# Patient Record
Sex: FEMALE | Race: WHITE | NOT HISPANIC OR LATINO | URBAN - METROPOLITAN AREA
[De-identification: names, ages, dates, MRNs, and addresses within clinical notes are randomized per-mention and may not be internally consistent; named-entity substitution may affect disease eponyms.]

---

## 2018-04-23 ENCOUNTER — INPATIENT (INPATIENT)
Facility: HOSPITAL | Age: 83
LOS: 6 days | Discharge: NURSING, REHAB OR LONG-TERM CARE | End: 2018-04-30
Attending: INTERNAL MEDICINE | Admitting: INTERNAL MEDICINE

## 2018-04-23 VITALS
TEMPERATURE: 99 F | DIASTOLIC BLOOD PRESSURE: 95 MMHG | HEART RATE: 98 BPM | RESPIRATION RATE: 18 BRPM | OXYGEN SATURATION: 98 % | SYSTOLIC BLOOD PRESSURE: 210 MMHG

## 2018-04-23 LAB
ALBUMIN SERPL ELPH-MCNC: 4.1 G/DL — SIGNIFICANT CHANGE UP (ref 3.5–5.2)
ALP SERPL-CCNC: 74 U/L — SIGNIFICANT CHANGE UP (ref 30–115)
ALT FLD-CCNC: 16 U/L — SIGNIFICANT CHANGE UP (ref 0–41)
ANION GAP SERPL CALC-SCNC: 13 MMOL/L — SIGNIFICANT CHANGE UP (ref 7–14)
APTT BLD: 29 SEC — SIGNIFICANT CHANGE UP (ref 27–39.2)
AST SERPL-CCNC: 21 U/L — SIGNIFICANT CHANGE UP (ref 0–41)
BASOPHILS # BLD AUTO: 0.07 K/UL — SIGNIFICANT CHANGE UP (ref 0–0.2)
BASOPHILS NFR BLD AUTO: 0.9 % — SIGNIFICANT CHANGE UP (ref 0–1)
BILIRUB SERPL-MCNC: 0.3 MG/DL — SIGNIFICANT CHANGE UP (ref 0.2–1.2)
BUN SERPL-MCNC: 16 MG/DL — SIGNIFICANT CHANGE UP (ref 10–20)
CALCIUM SERPL-MCNC: 9 MG/DL — SIGNIFICANT CHANGE UP (ref 8.5–10.1)
CHLORIDE SERPL-SCNC: 105 MMOL/L — SIGNIFICANT CHANGE UP (ref 98–110)
CK SERPL-CCNC: 89 U/L — SIGNIFICANT CHANGE UP (ref 0–225)
CO2 SERPL-SCNC: 26 MMOL/L — SIGNIFICANT CHANGE UP (ref 17–32)
CREAT SERPL-MCNC: 0.6 MG/DL — LOW (ref 0.7–1.5)
EOSINOPHIL # BLD AUTO: 0.14 K/UL — SIGNIFICANT CHANGE UP (ref 0–0.7)
EOSINOPHIL NFR BLD AUTO: 1.7 % — SIGNIFICANT CHANGE UP (ref 0–8)
GLUCOSE SERPL-MCNC: 98 MG/DL — SIGNIFICANT CHANGE UP (ref 70–99)
HCT VFR BLD CALC: 39.9 % — SIGNIFICANT CHANGE UP (ref 37–47)
HGB BLD-MCNC: 13.4 G/DL — SIGNIFICANT CHANGE UP (ref 12–16)
INR BLD: 1.02 RATIO — SIGNIFICANT CHANGE UP (ref 0.65–1.3)
LYMPHOCYTES # BLD AUTO: 1.14 K/UL — LOW (ref 1.2–3.4)
LYMPHOCYTES # BLD AUTO: 13.9 % — LOW (ref 20.5–51.1)
MCHC RBC-ENTMCNC: 27.5 PG — SIGNIFICANT CHANGE UP (ref 27–31)
MCHC RBC-ENTMCNC: 33.6 G/DL — SIGNIFICANT CHANGE UP (ref 32–37)
MCV RBC AUTO: 81.9 FL — SIGNIFICANT CHANGE UP (ref 81–99)
MONOCYTES # BLD AUTO: 0.71 K/UL — HIGH (ref 0.1–0.6)
MONOCYTES NFR BLD AUTO: 8.7 % — SIGNIFICANT CHANGE UP (ref 1.7–9.3)
NEUTROPHILS # BLD AUTO: 5.91 K/UL — SIGNIFICANT CHANGE UP (ref 1.4–6.5)
NEUTROPHILS NFR BLD AUTO: 72.2 % — SIGNIFICANT CHANGE UP (ref 42.2–75.2)
PLATELET # BLD AUTO: 258 K/UL — SIGNIFICANT CHANGE UP (ref 130–400)
POTASSIUM SERPL-MCNC: 3.8 MMOL/L — SIGNIFICANT CHANGE UP (ref 3.5–5)
POTASSIUM SERPL-SCNC: 3.8 MMOL/L — SIGNIFICANT CHANGE UP (ref 3.5–5)
PROT SERPL-MCNC: 6.7 G/DL — SIGNIFICANT CHANGE UP (ref 6–8)
PROTHROM AB SERPL-ACNC: 11 SEC — SIGNIFICANT CHANGE UP (ref 9.95–12.87)
RBC # BLD: 4.87 M/UL — SIGNIFICANT CHANGE UP (ref 4.2–5.4)
RBC # FLD: 13.2 % — SIGNIFICANT CHANGE UP (ref 11.5–14.5)
SODIUM SERPL-SCNC: 144 MMOL/L — SIGNIFICANT CHANGE UP (ref 135–146)
TROPONIN T SERPL-MCNC: <0.01 NG/ML — SIGNIFICANT CHANGE UP
TYPE + AB SCN PNL BLD: SIGNIFICANT CHANGE UP
WBC # BLD: 8.19 K/UL — SIGNIFICANT CHANGE UP (ref 4.8–10.8)
WBC # FLD AUTO: 8.19 K/UL — SIGNIFICANT CHANGE UP (ref 4.8–10.8)

## 2018-04-23 RX ORDER — LEVETIRACETAM 250 MG/1
500 TABLET, FILM COATED ORAL EVERY 12 HOURS
Qty: 0 | Refills: 0 | Status: DISCONTINUED | OUTPATIENT
Start: 2018-04-23 | End: 2018-04-30

## 2018-04-23 RX ORDER — ATORVASTATIN CALCIUM 80 MG/1
40 TABLET, FILM COATED ORAL AT BEDTIME
Qty: 0 | Refills: 0 | Status: DISCONTINUED | OUTPATIENT
Start: 2018-04-23 | End: 2018-04-30

## 2018-04-23 RX ORDER — LEVETIRACETAM 250 MG/1
1000 TABLET, FILM COATED ORAL ONCE
Qty: 0 | Refills: 0 | Status: COMPLETED | OUTPATIENT
Start: 2018-04-23 | End: 2018-04-23

## 2018-04-23 RX ORDER — ACETAMINOPHEN 500 MG
650 TABLET ORAL EVERY 6 HOURS
Qty: 0 | Refills: 0 | Status: DISCONTINUED | OUTPATIENT
Start: 2018-04-23 | End: 2018-04-30

## 2018-04-23 RX ORDER — ACETAMINOPHEN 500 MG
650 TABLET ORAL ONCE
Qty: 0 | Refills: 0 | Status: COMPLETED | OUTPATIENT
Start: 2018-04-23 | End: 2018-04-23

## 2018-04-23 RX ORDER — SODIUM CHLORIDE 5 G/100ML
500 INJECTION, SOLUTION INTRAVENOUS
Qty: 0 | Refills: 0 | Status: DISCONTINUED | OUTPATIENT
Start: 2018-04-23 | End: 2018-04-24

## 2018-04-23 RX ORDER — NICARDIPINE HYDROCHLORIDE 30 MG/1
5 CAPSULE, EXTENDED RELEASE ORAL
Qty: 40 | Refills: 0 | Status: DISCONTINUED | OUTPATIENT
Start: 2018-04-23 | End: 2018-04-25

## 2018-04-23 RX ADMIN — Medication 650 MILLIGRAM(S): at 23:45

## 2018-04-23 RX ADMIN — LEVETIRACETAM 400 MILLIGRAM(S): 250 TABLET, FILM COATED ORAL at 22:52

## 2018-04-23 RX ADMIN — NICARDIPINE HYDROCHLORIDE 25 MG/HR: 30 CAPSULE, EXTENDED RELEASE ORAL at 22:13

## 2018-04-23 RX ADMIN — SODIUM CHLORIDE 50 MILLILITER(S): 5 INJECTION, SOLUTION INTRAVENOUS at 22:13

## 2018-04-23 NOTE — H&P ADULT - HISTORY OF PRESENT ILLNESS
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  patient states that her legs gave out from underneath her and she was unable to get up due to weakness. Was on the floor for approximately 1 hour before her  found her and tried to help her up. came to the ED 3 hours after initial fall. In the ED patient found to be hypertensive /95, NIHSS 7, stroke code called and patient found to have an intraparenchymal bleed.   intially during interview patient denied any symptoms including headache but immediately upon leaving room patient states that she does have a frontal headache. 83 yo female with PMH of HLD 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  patient states that her legs gave out from underneath her and she was unable to get up due to weakness. Was on the floor for approximately 1 hour before her  found her and tried to help her up. came to the ED 3 hours after initial fall. In the ED patient found to be hypertensive /95, NIHSS 7, stroke code called and patient found to have an intraparenchymal bleed.   intially during interview patient denied any symptoms including headache but immediately upon leaving room patient states that she does have a frontal headache.    in december 2017 patient had a mechanical fall, tripped over roadside curb getting out of the car and had inability to ambulate. required assistance to get indoors. went to see PMD, had a swollen ankle, not acute fracture noted from event, weakness resolved spontaneously.

## 2018-04-23 NOTE — H&P ADULT - NSHPSOCIALHISTORY_GEN_ALL_CORE
quit smoking 3 months ptp (smoked 1ppd since 17y/o)  no etoh use  no drug use    lives at home with   completes all ADL's except driving

## 2018-04-23 NOTE — CONSULT NOTE ADULT - ASSESSMENT
a/p as above    no neurosurgical intervention at this time    maintain bp between 130-140 systolic    q 1hour neuro checks    contentious 3% saline at 50 ml     platelets to reverse aspirin    icu admission    keppra 1gm once; 500 mg bid    discussed with dr murry and agrees a/p as above    no neurosurgical intervention at this time    repeat head ct noncontrast 4/24/18  0300    possible cta in am     maintain bp between 130-140 systolic    q 1hour neuro checks    contentious 3% saline at 50 ml     platelets to reverse aspirin    icu admission    keppra 1gm once; 500 mg bid    discussed with dr murry and agrees a/p as above    no neurosurgical intervention at this time    repeat head ct noncontrast 4/24/18  0300    possible cta in am     maintain bp between 130-140 systolic    target sodium of 150-155    q 1hour neuro checks    contentious 3% saline at 50 ml     platelets to reverse aspirin    icu admission    keppra 1gm once; 500 mg bid    discussed with dr murry and agrees

## 2018-04-23 NOTE — ED PROVIDER NOTE - MEDICAL DECISION MAKING DETAILS
I personally evaluated the patient. I reviewed the Resident’s or Physician Assistant’s note (as assigned above), and agree with the findings and plan except as documented in my note. Endorsed to the ICU, neurology and Nsx on board as well, given platelets to revers ICH

## 2018-04-23 NOTE — ED PROVIDER NOTE - NS ED ROS FT
Eyes:  No visual changes, eye pain or discharge.  ENMT:  No hearing changes, pain, no sore throat or runny nose, no difficulty swallowing  Cardiac:  No chest pain, SOB or edema. No chest pain with exertion.  Respiratory:  No cough or respiratory distress. No hemoptysis. No history of asthma or RAD.  GI:  No nausea, vomiting, diarrhea or abdominal pain.  :  No dysuria, frequency or burning.  MS:  right sided weakness   Neuro:  right sided weakness, slurred speech   Skin:  No skin rash.   Endocrine: No history of thyroid disease or diabetes.

## 2018-04-23 NOTE — H&P ADULT - ASSESSMENT
83 yo female p/w fall and right sidede weakness, found to have intracranial  intraparenchymal hemorrhage      # intracranial parenchymal hemorrhage with mild surrounding edema  -admit to ICU  Neurosurgery on board  - patient given 2u of platelets to reverse aspirin per neurosurgery  - seizure ppx per NS-loaded with 1G keppra, then 500mg bid  -NPO, S&S eval  - nicardipine infusion -keep bp <140/90  - 3% saline. goal -155. check BMP q4h  - tylenol prn  -patient and family instructed to notify heathcare team of any changes to patients status ranging from change in headache, to new focal deficit.  -2decho  - 1st set cardiac enzymes negative, check 2nd set in am  -pt/rehab when more stable    #hld  atorvastatin 40mg qhs    #dvt ppx- intermittent pneumatic compression

## 2018-04-23 NOTE — ED PROVIDER NOTE - OBJECTIVE STATEMENT
81 yo F w hx of HLD c/o right sided weakness and slurred speech that started suddenly 3 hours prior to her arrival.  Pt states she was ironing and fell to the ground.  NO n/v.  no chest pain.  no loc.  pt a and o x3

## 2018-04-23 NOTE — H&P ADULT - NSHPPHYSICALEXAM_GEN_ALL_CORE
PHYSICAL EXAM:  GENERAL: NAD, speaks in full sentences, no signs of respiratory distress  HEAD:  Atraumatic, Normocephalic  EYES: EOMI, PERRLA, conjunctiva and sclera clear  NECK: Supple, No JVD  CHEST/LUNG: Clear to auscultation bilaterally; No wheeze; No crackles; No accessory muscles used  HEART: Regular rate and rhythm; No murmurs;   ABDOMEN: Soft, Nontender, Nondistended; Bowel sounds present; No guarding  EXTREMITIES:   No cyanosis or edema, right lowere extremity 0/10 and  no sensation, left lowere extremity 5/5 strength, right upper extremity 2/5 stregth, left upper  extremity 5/5 strength  PSYCH: AAOx3  NEUROLOGY: non-focal  SKIN: No rashes or lesions PHYSICAL EXAM:  GENERAL: NAD, speaks in full sentences, no signs of respiratory distress  HEAD:  Atraumatic, Normocephalic  EYES: EOMI, PERRLA, conjunctiva and sclera clear  NECK: Supple, No JVD  CHEST/LUNG: Clear to auscultation bilaterally; No wheeze; No crackles; No accessory muscles used  HEART: Regular rate and rhythm; No murmurs;   ABDOMEN: Soft, Nontender, Nondistended; Bowel sounds present; No guarding  EXTREMITIES:   No cyanosis or edema, right lower extremity 0/10 and  no sensation, left lower extremity 5/5 strength, right upper extremity 2/5 stregth, left upper  extremity 5/5 strength  PSYCH: AAOx2 (person and place)  SKIN: No rashes or lesions

## 2018-04-23 NOTE — ED PROVIDER NOTE - PHYSICAL EXAMINATION
CONSTITUTIONAL: Well-developed; well-nourished; in no acute distress.   SKIN: warm, dry  HEAD: Normocephalic; atraumatic.  EYES: PERRL, EOMI, no conjunctival erythema  ENT: No nasal discharge; airway clear.  NECK: Supple; non tender.  CARD: S1, S2 normal; no murmurs, gallops, or rubs. Regular rate and rhythm.   RESP: No wheezes, rales or rhonchi.  ABD: soft ntnd  EXT: Normal ROM.  No clubbing, cyanosis or edema.   NEURO: Alert, oriented, CN intact,  Right sided upper ext drift, right lower ext weakness, sensation grossly intact   PSYCH: Cooperative, appropriate.

## 2018-04-23 NOTE — ED PROVIDER NOTE - ATTENDING CONTRIBUTION TO CARE
83 yo F w hx of HLD c/o right sided weakness and slurred speech that started suddenly 3 hours prior to her arrival.  Pt states she was ironing and fell to the ground.  NO n/v.  no chest pain.  no loc. Patient as found on the ground by family patient has slurred speech and not moving rle as per ems.     CONSTITUTIONAL: Patient is bibems and is leaning to the right side, patient is verbal, + mild slurred speech but is improving, patient providing history and is protecting airway  SKIN: skin exam is warm and dry, no acute rash.  HEAD: Normocephalic; atraumatic.  EYES: PERRL, 3 mm bilateral, no nystagmus, EOM intact; conjunctiva and sclera clear.  ENT: No nasal discharge; airway clear.  NECK: Supple; non tender.+ full passive ROM in all directions. No JVD  CARD: S1, S2 normal; no murmurs, gallops, or rubs. Regular rate and rhythm. + Symmetric Strong Pulses  RESP: No wheezes, rales or rhonchi. Good air movement bilaterally  ABD: soft; non-distended; non-tender. No Rebound, No Gaurding, No signs of peritnitis, No CVA tenderness  EXT: + minimal movement of rle. Normal ROM. No clubbing, cyanosis or edema. Dp and Pt Pulses intact. Cap refill less than 3 seconds  NEURO: + slurred speech, + RUE weakness, + mild right lower half facial droop, + rle weakness, otherwise, CN 2-12 intact, normal finger to nose, normal romberg, did not attempt ambulation, , no other sensory or motor deficits, Alert, oriented, grossly unremarkable. No Focal deficits. GCS 15. NIH as indicated by mlp  PSYCH: Cooperative, appropriate.

## 2018-04-23 NOTE — ED PROCEDURE NOTE - ATTENDING CONTRIBUTION TO CARE
I was present for the key portions of the procedure and available as supervisor for the entire procedure as documented.
I was present for the key portions of the procedure and available as supervisor for the entire procedure as documented.

## 2018-04-23 NOTE — ED PROVIDER NOTE - PROGRESS NOTE DETAILS
PT with ICH, will place Arterial Line, start nicardipine drip spoke to neurosurgery ABHINAV Cho,  will come evaluate NSX at bedside,  pt approved for MICU by Dr. Sethi

## 2018-04-23 NOTE — CONSULT NOTE ADULT - SUBJECTIVE AND OBJECTIVE BOX
82y    Patient is a 82y old  Female who presents with a chief complaint of     HPI:      PAST MEDICAL & SURGICAL HISTORY:      Home Medications:      Allergies    Celebrex (Other (Moderate))  sulfonamides (Unknown)    Intolerances          Vital Signs Last 24 Hrs  T(C): 37.1 (23 Apr 2018 19:14), Max: 37.1 (23 Apr 2018 19:14)  T(F): 98.8 (23 Apr 2018 19:14), Max: 98.8 (23 Apr 2018 19:14)  HR: 98 (23 Apr 2018 19:14) (98 - 98)  BP: 210/95 (23 Apr 2018 19:14) (210/95 - 210/95)  BP(mean): --  RR: 18 (23 Apr 2018 19:14) (18 - 18)  SpO2: 98% (23 Apr 2018 19:14) (98% - 98%)      PHYSICAL EXAM:      Constitutional:    Eyes:    ENMT:    Neck:    Breasts:    Back:    Respiratory:    Cardiovascular:    Gastrointestinal:    Genitourinary:    Rectal:    Extremities:    Vascular:    Neurological:    Skin:    Lymph Nodes:    Musculoskeletal:    Psychiatric:                                13.4   8.19  )-----------( 258      ( 23 Apr 2018 18:59 )             39.9         04-23    144  |  105  |  16  ----------------------------<  98  3.8   |  26  |  0.6<L>    Ca    9.0      23 Apr 2018 18:59    TPro  6.7  /  Alb  4.1  /  TBili  0.3  /  DBili  x   /  AST  21  /  ALT  16  /  AlkPhos  74  04-23      PT/INR - ( 23 Apr 2018 18:59 )   PT: 11.00 sec;   INR: 1.02 ratio         PTT - ( 23 Apr 2018 18:59 )  PTT:29.0 sec        MEDICATIONS  (STANDING):  niCARdipine Infusion 5 mG/Hr (25 mL/Hr) IV Continuous <Continuous>  sodium chloride 3%. 500 milliLiter(s) (50 mL/Hr) IV Continuous <Continuous>    MEDICATIONS  (PRN): Patient is a 82y old  Female who presents with a chief complaint of     HPI:   81 yo F w hx of HLD c/o right sided weakness and slurred speech that started suddenly 3 hours prior to her arrival.  Pt states she was ironing and fell to the ground.  NO n/v.  no chest pain.  no loc.  pt a and o x3, pt states she became weak while ironing and fell to ground unable to get up.      PAST MEDICAL & SURGICAL HISTORY:  htn    Home Medications:  asa    Allergies    Celebrex (Other (Moderate))  sulfonamides (Unknown)        Vital Signs Last 24 Hrs  T(C): 37.1 (23 Apr 2018 19:14), Max: 37.1 (23 Apr 2018 19:14)  T(F): 98.8 (23 Apr 2018 19:14), Max: 98.8 (23 Apr 2018 19:14)  HR: 98 (23 Apr 2018 19:14) (98 - 98)  BP: 210/95 (23 Apr 2018 19:14) (210/95 - 210/95)    RR: 18 (23 Apr 2018 19:14) (18 - 18)  SpO2: 98% (23 Apr 2018 19:14) (98% - 98%)      PHYSICAL EXAM:      Constitutional: aaxo x 3, speak clearly, moving spontaniously    Eyes: 3mm perrl, eomi    ENMT: slight right side facial droop, able to smile , frown, tongue midline, sensory intact    Neck: head tilted left moving spontaniously    Back: no midline tenderness    Respiratory: cta b/l    Cardiovascular: s1, s2    Gastrointestinal: abd soft nondistended nontender,     Genitourinary: no incontenence    Rectal: declined    Extremities: left upper extremity - finger abduction, , thumbs up wrist flexion elbow flex/extension shoulder abduction intact motor 4/5 sensory intact                    right upper extremity - , thumbs up wrist flexion elbow flex/extension intact motor 3/5 sensory intact                    left lower extremity - great toe df/pf, ankle df knee flex/extension hip flexion slr, intact 4/5 motor, sensory intact three dematones                    right lower extremity - withdraw to pain, slight ankle df, motor 2/5 sensory intact               13.4   8.19  )-----------( 258      ( 23 Apr 2018 18:59 )             39.9         04-23    144  |  105  |  16  ----------------------------<  98  3.8   |  26  |  0.6<L>    Ca    9.0      23 Apr 2018 18:59    TPro  6.7  /  Alb  4.1  /  TBili  0.3  /  DBili  x   /  AST  21  /  ALT  16  /  AlkPhos  74  04-23      PT/INR - ( 23 Apr 2018 18:59 )   PT: 11.00 sec;   INR: 1.02 ratio         PTT - ( 23 Apr 2018 18:59 )  PTT:29.0 sec       CT Head No Cont (04.23.18 @ 19:10) >  Acute intracranial hemorrhage within the left medial posterior parietal   region measuring approximately 3.4 x 2.3 cm (series 2 image 24). Mild   surrounding edema without significant mass effect.        MEDICATIONS  (STANDING):  niCARdipine Infusion 5 mG/Hr (25 mL/Hr) IV Continuous <Continuous>  sodium chloride 3%. 500 milliLiter(s) (50 mL/Hr) IV Continuous <Continuous>    MEDICATIONS  (PRN): Patient is a 82y old  Female who presents with a chief complaint of weakness on right side    HPI:   81 yo F w hx of HLD c/o right sided weakness and slurred speech that started suddenly 3 hours prior to her arrival.  Pt states she was ironing and fell to the ground.  NO n/v.  no chest pain.  no loc.  pt a and o x3, pt states she became weak while ironing and fell to ground unable to get up.      PAST MEDICAL & SURGICAL HISTORY:  htn    Home Medications:  asa    Allergies    Celebrex (Other (Moderate))  sulfonamides (Unknown)        Vital Signs Last 24 Hrs  T(C): 37.1 (23 Apr 2018 19:14), Max: 37.1 (23 Apr 2018 19:14)  T(F): 98.8 (23 Apr 2018 19:14), Max: 98.8 (23 Apr 2018 19:14)  HR: 98 (23 Apr 2018 19:14) (98 - 98)  BP: 210/95 (23 Apr 2018 19:14) (210/95 - 210/95)    RR: 18 (23 Apr 2018 19:14) (18 - 18)  SpO2: 98% (23 Apr 2018 19:14) (98% - 98%)      PHYSICAL EXAM:      Constitutional: aaxo x 3, speak clearly, moving spontaniously    Eyes: 3mm perrl, eomi    ENMT: slight right side facial droop, able to smile , frown, tongue midline, sensory intact    Neck: head tilted left moving spontaniously    Back: no midline tenderness    Respiratory: cta b/l    Cardiovascular: s1, s2    Gastrointestinal: abd soft nondistended nontender,     Genitourinary: no incontenence    Rectal: declined    Extremities: left upper extremity - finger abduction, , thumbs up wrist flexion elbow flex/extension shoulder abduction intact motor 4/5 sensory intact                    right upper extremity - , thumbs up wrist flexion elbow flex/extension intact motor 3/5 sensory intact                    left lower extremity - great toe df/pf, ankle df knee flex/extension hip flexion slr, intact 4/5 motor, sensory intact three dematones                    right lower extremity - withdraw to pain, slight ankle df, motor 2/5 sensory intact               13.4   8.19  )-----------( 258      ( 23 Apr 2018 18:59 )             39.9         04-23    144  |  105  |  16  ----------------------------<  98  3.8   |  26  |  0.6<L>    Ca    9.0      23 Apr 2018 18:59    TPro  6.7  /  Alb  4.1  /  TBili  0.3  /  DBili  x   /  AST  21  /  ALT  16  /  AlkPhos  74  04-23      PT/INR - ( 23 Apr 2018 18:59 )   PT: 11.00 sec;   INR: 1.02 ratio         PTT - ( 23 Apr 2018 18:59 )  PTT:29.0 sec       CT Head No Cont (04.23.18 @ 19:10) >  Acute intracranial hemorrhage within the left medial posterior parietal   region measuring approximately 3.4 x 2.3 cm (series 2 image 24). Mild   surrounding edema without significant mass effect.        MEDICATIONS  (STANDING):  niCARdipine Infusion 5 mG/Hr (25 mL/Hr) IV Continuous <Continuous>  sodium chloride 3%. 500 milliLiter(s) (50 mL/Hr) IV Continuous <Continuous>    MEDICATIONS  (PRN):

## 2018-04-23 NOTE — H&P ADULT - NSHPLABSRESULTS_GEN_ALL_CORE
Allergies    Celebrex (Other (Moderate))  sulfonamides (Unknown)    Intolerances        T(F): 98.8 (04-23-18 @ 19:14), Max: 98.8 (04-23-18 @ 19:14)  HR: 98 (04-23-18 @ 19:14) (98 - 98)  BP: 210/95 (04-23-18 @ 19:14) (210/95 - 210/95)  BP(mean): --  RR: 18 (04-23-18 @ 19:14) (18 - 18)  SpO2: 98% (04-23-18 @ 19:14) (98% - 98%)    04-23    144  |  105  |  16  ----------------------------<  98  3.8   |  26  |  0.6<L>    Ca    9.0      23 Apr 2018 18:59    TPro  6.7  /  Alb  4.1  /  TBili  0.3  /  DBili  x   /  AST  21  /  ALT  16  /  AlkPhos  74  04-23                            13.4   8.19  )-----------( 258      ( 23 Apr 2018 18:59 )             39.9                                     PT/INR - ( 23 Apr 2018 18:59 )   PT: 11.00 sec;   INR: 1.02 ratio         PTT - ( 23 Apr 2018 18:59 )  PTT:29.0 sec    Lactate Trend      CARDIAC MARKERS ( 23 Apr 2018 18:59 )  x     / <0.01 ng/mL / 89 U/L / x     / x Allergies    Celebrex (Other (Moderate))  sulfonamides (Unknown)    Intolerances        T(F): 98.8 (04-23-18 @ 19:14), Max: 98.8 (04-23-18 @ 19:14)  HR: 98 (04-23-18 @ 19:14) (98 - 98)  BP: 210/95 (04-23-18 @ 19:14) (210/95 - 210/95)  BP(mean): --  RR: 18 (04-23-18 @ 19:14) (18 - 18)  SpO2: 98% (04-23-18 @ 19:14) (98% - 98%)    04-23    144  |  105  |  16  ----------------------------<  98  3.8   |  26  |  0.6<L>    Ca    9.0      23 Apr 2018 18:59    TPro  6.7  /  Alb  4.1  /  TBili  0.3  /  DBili  x   /  AST  21  /  ALT  16  /  AlkPhos  74  04-23                            13.4   8.19  )-----------( 258      ( 23 Apr 2018 18:59 )             39.9                                     PT/INR - ( 23 Apr 2018 18:59 )   PT: 11.00 sec;   INR: 1.02 ratio         PTT - ( 23 Apr 2018 18:59 )  PTT:29.0 sec    Lactate Trend      CARDIAC MARKERS ( 23 Apr 2018 18:59 )  x     / <0.01 ng/mL / 89 U/L / x     / x    < from: CT Head No Cont (04.23.18 @ 19:10) >    Acute intracranial parenchymal hemorrhage within the left medial   posterior parietal region measuring approximately 3.4 cm. Mild   surrounding edema without significant mass effect.    < end of copied text >

## 2018-04-23 NOTE — ED PROCEDURE NOTE - CPROC ED ARTER LINE DETAIL1
Using sterile technique, the correct location was identified, and a needle was inserted into the artery (specify in FT).

## 2018-04-24 LAB
ALBUMIN SERPL ELPH-MCNC: 3.8 G/DL — SIGNIFICANT CHANGE UP (ref 3.5–5.2)
ALP SERPL-CCNC: 65 U/L — SIGNIFICANT CHANGE UP (ref 30–115)
ALT FLD-CCNC: 18 U/L — SIGNIFICANT CHANGE UP (ref 0–41)
ANION GAP SERPL CALC-SCNC: 11 MMOL/L — SIGNIFICANT CHANGE UP (ref 7–14)
ANION GAP SERPL CALC-SCNC: 12 MMOL/L — SIGNIFICANT CHANGE UP (ref 7–14)
ANION GAP SERPL CALC-SCNC: 12 MMOL/L — SIGNIFICANT CHANGE UP (ref 7–14)
ANION GAP SERPL CALC-SCNC: 16 MMOL/L — HIGH (ref 7–14)
APTT BLD: 25.2 SEC — LOW (ref 27–39.2)
AST SERPL-CCNC: 16 U/L — SIGNIFICANT CHANGE UP (ref 0–41)
BASOPHILS # BLD AUTO: 0.03 K/UL — SIGNIFICANT CHANGE UP (ref 0–0.2)
BASOPHILS NFR BLD AUTO: 0.3 % — SIGNIFICANT CHANGE UP (ref 0–1)
BILIRUB DIRECT SERPL-MCNC: <0.2 MG/DL — SIGNIFICANT CHANGE UP (ref 0–0.2)
BILIRUB INDIRECT FLD-MCNC: >0.2 MG/DL — SIGNIFICANT CHANGE UP (ref 0.2–1.2)
BILIRUB SERPL-MCNC: 0.4 MG/DL — SIGNIFICANT CHANGE UP (ref 0.2–1.2)
BUN SERPL-MCNC: 11 MG/DL — SIGNIFICANT CHANGE UP (ref 10–20)
BUN SERPL-MCNC: 13 MG/DL — SIGNIFICANT CHANGE UP (ref 10–20)
BUN SERPL-MCNC: 14 MG/DL — SIGNIFICANT CHANGE UP (ref 10–20)
BUN SERPL-MCNC: 15 MG/DL — SIGNIFICANT CHANGE UP (ref 10–20)
CALCIUM SERPL-MCNC: 8.5 MG/DL — SIGNIFICANT CHANGE UP (ref 8.5–10.1)
CALCIUM SERPL-MCNC: 8.5 MG/DL — SIGNIFICANT CHANGE UP (ref 8.5–10.1)
CALCIUM SERPL-MCNC: 8.8 MG/DL — SIGNIFICANT CHANGE UP (ref 8.5–10.1)
CALCIUM SERPL-MCNC: 9.3 MG/DL — SIGNIFICANT CHANGE UP (ref 8.5–10.1)
CHLORIDE SERPL-SCNC: 104 MMOL/L — SIGNIFICANT CHANGE UP (ref 98–110)
CHLORIDE SERPL-SCNC: 107 MMOL/L — SIGNIFICANT CHANGE UP (ref 98–110)
CHLORIDE SERPL-SCNC: 107 MMOL/L — SIGNIFICANT CHANGE UP (ref 98–110)
CHLORIDE SERPL-SCNC: 109 MMOL/L — SIGNIFICANT CHANGE UP (ref 98–110)
CHOLEST SERPL-MCNC: 137 MG/DL — SIGNIFICANT CHANGE UP (ref 100–200)
CK MB CFR SERPL CALC: 4.2 NG/ML — SIGNIFICANT CHANGE UP (ref 0.6–6.3)
CK SERPL-CCNC: 93 U/L — SIGNIFICANT CHANGE UP (ref 0–225)
CO2 SERPL-SCNC: 23 MMOL/L — SIGNIFICANT CHANGE UP (ref 17–32)
CO2 SERPL-SCNC: 25 MMOL/L — SIGNIFICANT CHANGE UP (ref 17–32)
CO2 SERPL-SCNC: 26 MMOL/L — SIGNIFICANT CHANGE UP (ref 17–32)
CO2 SERPL-SCNC: 26 MMOL/L — SIGNIFICANT CHANGE UP (ref 17–32)
CREAT SERPL-MCNC: 0.5 MG/DL — LOW (ref 0.7–1.5)
CREAT SERPL-MCNC: 0.5 MG/DL — LOW (ref 0.7–1.5)
CREAT SERPL-MCNC: 0.6 MG/DL — LOW (ref 0.7–1.5)
CREAT SERPL-MCNC: 0.6 MG/DL — LOW (ref 0.7–1.5)
EOSINOPHIL # BLD AUTO: 2.96 K/UL — HIGH (ref 0–0.7)
EOSINOPHIL NFR BLD AUTO: 30 % — HIGH (ref 0–8)
GLUCOSE SERPL-MCNC: 115 MG/DL — HIGH (ref 70–99)
GLUCOSE SERPL-MCNC: 125 MG/DL — HIGH (ref 70–99)
GLUCOSE SERPL-MCNC: 130 MG/DL — HIGH (ref 70–99)
GLUCOSE SERPL-MCNC: 134 MG/DL — HIGH (ref 70–99)
HCT VFR BLD CALC: 34.7 % — LOW (ref 37–47)
HDLC SERPL-MCNC: 50 MG/DL — SIGNIFICANT CHANGE UP (ref 40–125)
HGB BLD-MCNC: 11.6 G/DL — LOW (ref 12–16)
IMM GRANULOCYTES NFR BLD AUTO: 0.4 % — HIGH (ref 0.1–0.3)
INR BLD: 1.09 RATIO — SIGNIFICANT CHANGE UP (ref 0.65–1.3)
LIPID PNL WITH DIRECT LDL SERPL: 76 MG/DL — SIGNIFICANT CHANGE UP (ref 4–129)
LYMPHOCYTES # BLD AUTO: 1.01 K/UL — LOW (ref 1.2–3.4)
LYMPHOCYTES # BLD AUTO: 10.2 % — LOW (ref 20.5–51.1)
MAGNESIUM SERPL-MCNC: 1.7 MG/DL — LOW (ref 1.8–2.4)
MCHC RBC-ENTMCNC: 27.4 PG — SIGNIFICANT CHANGE UP (ref 27–31)
MCHC RBC-ENTMCNC: 33.4 G/DL — SIGNIFICANT CHANGE UP (ref 32–37)
MCV RBC AUTO: 81.8 FL — SIGNIFICANT CHANGE UP (ref 81–99)
MONOCYTES # BLD AUTO: 0.51 K/UL — SIGNIFICANT CHANGE UP (ref 0.1–0.6)
MONOCYTES NFR BLD AUTO: 5.2 % — SIGNIFICANT CHANGE UP (ref 1.7–9.3)
NEUTROPHILS # BLD AUTO: 5.32 K/UL — SIGNIFICANT CHANGE UP (ref 1.4–6.5)
NEUTROPHILS NFR BLD AUTO: 53.9 % — SIGNIFICANT CHANGE UP (ref 42.2–75.2)
PLATELET # BLD AUTO: 315 K/UL — SIGNIFICANT CHANGE UP (ref 130–400)
POTASSIUM SERPL-MCNC: 3.5 MMOL/L — SIGNIFICANT CHANGE UP (ref 3.5–5)
POTASSIUM SERPL-MCNC: 3.5 MMOL/L — SIGNIFICANT CHANGE UP (ref 3.5–5)
POTASSIUM SERPL-MCNC: 3.6 MMOL/L — SIGNIFICANT CHANGE UP (ref 3.5–5)
POTASSIUM SERPL-MCNC: 3.8 MMOL/L — SIGNIFICANT CHANGE UP (ref 3.5–5)
POTASSIUM SERPL-SCNC: 3.5 MMOL/L — SIGNIFICANT CHANGE UP (ref 3.5–5)
POTASSIUM SERPL-SCNC: 3.5 MMOL/L — SIGNIFICANT CHANGE UP (ref 3.5–5)
POTASSIUM SERPL-SCNC: 3.6 MMOL/L — SIGNIFICANT CHANGE UP (ref 3.5–5)
POTASSIUM SERPL-SCNC: 3.8 MMOL/L — SIGNIFICANT CHANGE UP (ref 3.5–5)
PROT SERPL-MCNC: 6.1 G/DL — SIGNIFICANT CHANGE UP (ref 6–8)
PROTHROM AB SERPL-ACNC: 11.8 SEC — SIGNIFICANT CHANGE UP (ref 9.95–12.87)
RBC # BLD: 4.24 M/UL — SIGNIFICANT CHANGE UP (ref 4.2–5.4)
RBC # FLD: 13.3 % — SIGNIFICANT CHANGE UP (ref 11.5–14.5)
SODIUM SERPL-SCNC: 143 MMOL/L — SIGNIFICANT CHANGE UP (ref 135–146)
SODIUM SERPL-SCNC: 144 MMOL/L — SIGNIFICANT CHANGE UP (ref 135–146)
SODIUM SERPL-SCNC: 145 MMOL/L — SIGNIFICANT CHANGE UP (ref 135–146)
SODIUM SERPL-SCNC: 146 MMOL/L — SIGNIFICANT CHANGE UP (ref 135–146)
TOTAL CHOLESTEROL/HDL RATIO MEASUREMENT: 2.7 RATIO — LOW (ref 4–5.5)
TRIGL SERPL-MCNC: 68 MG/DL — SIGNIFICANT CHANGE UP (ref 10–149)
TROPONIN T SERPL-MCNC: <0.01 NG/ML — SIGNIFICANT CHANGE UP
WBC # BLD: 9.87 K/UL — SIGNIFICANT CHANGE UP (ref 4.8–10.8)
WBC # FLD AUTO: 9.87 K/UL — SIGNIFICANT CHANGE UP (ref 4.8–10.8)

## 2018-04-24 RX ORDER — SODIUM CHLORIDE 5 G/100ML
500 INJECTION, SOLUTION INTRAVENOUS
Qty: 0 | Refills: 0 | Status: DISCONTINUED | OUTPATIENT
Start: 2018-04-24 | End: 2018-04-24

## 2018-04-24 RX ORDER — HEPARIN SODIUM 5000 [USP'U]/ML
5000 INJECTION INTRAVENOUS; SUBCUTANEOUS EVERY 8 HOURS
Qty: 0 | Refills: 0 | Status: DISCONTINUED | OUTPATIENT
Start: 2018-04-24 | End: 2018-04-30

## 2018-04-24 RX ORDER — LABETALOL HCL 100 MG
10 TABLET ORAL ONCE
Qty: 0 | Refills: 0 | Status: COMPLETED | OUTPATIENT
Start: 2018-04-24 | End: 2018-04-24

## 2018-04-24 RX ORDER — AMLODIPINE BESYLATE 2.5 MG/1
10 TABLET ORAL EVERY 24 HOURS
Qty: 0 | Refills: 0 | Status: DISCONTINUED | OUTPATIENT
Start: 2018-04-24 | End: 2018-04-27

## 2018-04-24 RX ORDER — LABETALOL HCL 100 MG
100 TABLET ORAL THREE TIMES A DAY
Qty: 0 | Refills: 0 | Status: DISCONTINUED | OUTPATIENT
Start: 2018-04-24 | End: 2018-04-25

## 2018-04-24 RX ADMIN — ATORVASTATIN CALCIUM 40 MILLIGRAM(S): 80 TABLET, FILM COATED ORAL at 21:07

## 2018-04-24 RX ADMIN — Medication 100 MILLIGRAM(S): at 14:09

## 2018-04-24 RX ADMIN — LEVETIRACETAM 420 MILLIGRAM(S): 250 TABLET, FILM COATED ORAL at 06:29

## 2018-04-24 RX ADMIN — Medication 10 MILLIGRAM(S): at 03:45

## 2018-04-24 RX ADMIN — Medication 100 MILLIGRAM(S): at 11:05

## 2018-04-24 RX ADMIN — LEVETIRACETAM 420 MILLIGRAM(S): 250 TABLET, FILM COATED ORAL at 19:00

## 2018-04-24 RX ADMIN — HEPARIN SODIUM 5000 UNIT(S): 5000 INJECTION INTRAVENOUS; SUBCUTANEOUS at 21:08

## 2018-04-24 RX ADMIN — AMLODIPINE BESYLATE 10 MILLIGRAM(S): 2.5 TABLET ORAL at 11:05

## 2018-04-24 RX ADMIN — Medication 100 MILLIGRAM(S): at 21:07

## 2018-04-24 NOTE — PATIENT PROFILE ADULT. - SURGICAL SITE INCISION
Health Maintenance Summary     Topic Due On Due Status Completed On    Colorectal Cancer Screening - Colonoscopy  Jan 5, 2019 Not Due Jan 5, 2016    Immunization-Zoster Jun 24, 2003 Overdue     Immunization - Pneumococcal  Completed Sep 21, 2015    Medicare Wellness Visit Jul 28, 2017 Due Soon Jul 28, 2016    IMMUNIZATION - DTaP/Tdap/Td Sep 23, 2018 Not Due Sep 23, 2008    Immunization-Influenza Sep 1, 2017 Not Due Sep 16, 2016          Patient is due for topics as listed above, he wishes to decline at this time .     no

## 2018-04-24 NOTE — PROVIDER CONTACT NOTE (OTHER) - SITUATION
dx intraparychymal hemorrage. patient aox4 with repeat head ct done this shift at 1500. Hypertonic saline was discontinued, heparin sq was ordered following stable head ct as per neuro.

## 2018-04-24 NOTE — PROGRESS NOTE ADULT - ASSESSMENT
83 yo female p/w fall and right sideded weakness, found to have intracranial  intraparenchymal hemorrhage    SYSTEM BASED PLAN:    Neurologic:       Sedation-none       Analgesia-    Cardiovascular:       Vasopressors - none  on Nicardipine drip;    Pulmonary:       Ventilator/NIV settings -     Gastrointestinal:       Nutrition-     Genitourinary/Renal:       Goal fluid balance-       IV fluids-       Electrolytes-    Infectious Disease:       Antimicrobials-     Hematology/Oncology:    Endocrine:       Insulin regimen -     Musculoskeletal:       Activity-       Physical Therapy-       Skin/decub ulcers-     Indwelling vascular catheters:    Urinary Catheter:     Disposition:    Code Status: 83 yo female p/w fall and right sideded weakness, found to have intracranial  intraparenchymal hemorrhage    SYSTEM BASED PLAN:    Neurologic:       Sedation-none       Analgesia-    ce Neuro check Q2  neuro and neuro surgery follow up  cw keppra and 3% saline  check BMP at all lab times  target  155-150 sodium  repeat CT head    Cardiovascular:       Vasopressors - none  on Nicardipine drip; try to wean down  switch to Labetalol and amlodipine PO  target blood pressure less than 140 systolic  follow 2 D echo  Pulmonary:       Ventilator/NIV settings - none    Gastrointestinal:       Nutrition- speech and swallow eval    Genitourinary/Renal:       Goal fluid balance-even       IV fluids-3 %saline       Electrolytes-    Infectious Disease:       Antimicrobials- none    Hematology/Oncology:  ask NS about DVT ppx clearance  Endocrine:       Insulin regimen - if needed    Musculoskeletal:       Activity-       Physical Therapy-when stable       Skin/decub ulcers- none    Indwelling vascular catheters:  femoral line    Urinary Catheter: none     Full code    Disposition:    Code Status: 83 yo female p/w fall and right sideded weakness, found to have intracranial  intraparenchymal hemorrhage    SYSTEM BASED PLAN:    Neurologic:       Sedation-none       Analgesia-    ce Neuro check Q2  neuro and neuro surgery follow up  cw keppra and 3% saline  check BMP at all lab times  target  155-150 sodium  repeat CT head    Cardiovascular:       Vasopressors - none  on Nicardipine drip; try to wean down  switch to Labetalol and amlodipine PO  target blood pressure less than 140 systolic  follow 2 D echo  Pulmonary:       Ventilator/NIV settings - none    Gastrointestinal:       Nutrition- speech and swallow eval    Genitourinary/Renal:       Goal fluid balance-even       IV fluids-3 %saline       Electrolytes-    Infectious Disease:       Antimicrobials- none    Hematology/Oncology:  ask NS about DVT ppx clearance  cw compression device for now  Endocrine:       Insulin regimen - if needed    Musculoskeletal:       Activity-       Physical Therapy-when stable       Skin/decub ulcers- none    Indwelling vascular catheters:  femoral line    Urinary Catheter: none     Full code    Disposition:    Code Status: 83 yo female p/w fall and right sideded weakness, found to have intracranial  intraparenchymal hemorrhage    SYSTEM BASED PLAN:    Neurologic:       Sedation-none       Analgesia-    ce Neuro check Q2  neuro and neuro surgery follow up  cw keppra and 3% saline  check BMP at all lab times  target  155-150 sodium  repeat CT head    Cardiovascular:       Vasopressors - none  on Nicardipine drip; try to wean down  switch to Labetalol and amlodipine PO  target blood pressure less than 140 systolic  follow 2 D echo  Pulmonary:       Ventilator/NIV settings - none    Gastrointestinal:       Nutrition- speech and swallow eval    Genitourinary/Renal:       Goal fluid balance-even       IV fluids-3 %saline       Electrolytes-    Infectious Disease:       Antimicrobials- none    Hematology/Oncology:  ask NS about DVT ppx clearance  cw compression device for now  Endocrine:       Insulin regimen - if needed    Musculoskeletal:       Activity-       Physical Therapy-when stable       Skin/decub ulcers- none    Indwelling vascular catheters:  femoral line    Urinary Catheter: none     Disposition:  keep in MICU for monitoring    Code Status:FULL CODE

## 2018-04-24 NOTE — CONSULT NOTE ADULT - ATTENDING COMMENTS
Patient seen and examined agree with above except as noted.  Patient presented as a stroke code found to have large left ICH.  Currently RUE 0/5 and RLE 0/5, Neglect on right side and decreased sensation on right side.  VFF  Gait deferred    A/P  Patient with ICH unclear etiology possibly hypertensive.  1. Repeat CTH today  2. -130  3. Neurochecks Q1-2 hours  4. Call with changes or for questions
As above.  Atypical area for hypertensive bleed.  Recommend CTA or MRI with and without

## 2018-04-24 NOTE — CONSULT NOTE ADULT - ASSESSMENT
83 yo Right handed female with PMH of HLD p/w s/p weak collapse  with right sided upper and lower extremity weakness .Found to have Acute ICH    pLAN  -Hold anticoagulants and antiplatelets  -Neurosx follow up  -keep sbp 140-160  -keep hob >30 degrees  -Repeat hct this AM  -neuro checks q 1 hr  -Continue Keppra 500 mg bid 85 yo Right handed female with PMH of HLD p/w s/p weak collapse  with right sided upper and lower extremity weakness .Found to have Acute ICH    pLAN  -Hold anticoagulants and antiplatelets  -Neurosx follow up  -keep sbp <140  -keep hob >30 degrees  -Repeat hct this AM  -neuro checks q 1 hr  -Continue Keppra 500 mg bid  -Speech and swallow eval

## 2018-04-24 NOTE — PROGRESS NOTE ADULT - SUBJECTIVE AND OBJECTIVE BOX
Patient is a 82y old  Female who presents with a chief complaint of intracranial parenchymal hemorrhage (23 Apr 2018 23:10)      HPI:  83 yo female with PMH of HLD 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  patient states that her legs gave out from underneath her and she was unable to get up due to weakness. Was on the floor for approximately 1 hour before her  found her and tried to help her up. came to the ED 3 hours after initial fall. In the ED patient found to be hypertensive /95, NIHSS 7, stroke code called and patient found to have an intraparenchymal bleed.   intially during interview patient denied any symptoms including headache but immediately upon leaving room patient states that she does have a frontal headache.    in december 2017 patient had a mechanical fall, tripped over roadside curb getting out of the car and had inability to ambulate. required assistance to get indoors. went to see PMD, had a swollen ankle, not acute fracture noted from event, weakness resolved spontaneously. (23 Apr 2018 23:10)      PAST MEDICAL & SURGICAL HISTORY:  HLD (hyperlipidemia)  No significant past surgical history    Allergies    Celebrex (Other (Moderate))  sulfonamides (Unknown)    Intolerances      FAMILY HISTORY:  No pertinent family history in first degree relatives    Home Medications:  aspirin 81 mg oral tablet, chewable: 1 tab(s) orally once a day (23 Apr 2018 23:28)  simvastatin 40 mg oral tablet: 1 tab(s) orally once a day (at bedtime) (23 Apr 2018 23:28)    Occupation:  Alochol: Denied  Smoking: Denied  Drug Use: Denied  Marital Status:         ROS: as in HPI; All other systems reviewed are negative    ICU Vital Signs Last 24 Hrs  T(C): 36.1 (24 Apr 2018 12:00), Max: 36.1 (24 Apr 2018 08:00)  T(F): 97 (24 Apr 2018 12:00), Max: 97 (24 Apr 2018 08:00)  HR: 90 (24 Apr 2018 19:00) (68 - 116)  BP: 142/65 (24 Apr 2018 03:15) (142/65 - 142/65)  BP(mean): 98 (24 Apr 2018 03:15) (98 - 98)  ABP: 132/46 (24 Apr 2018 19:00) (116/40 - 194/56)  ABP(mean): 78 (24 Apr 2018 19:00) (62 - 120)  RR: 33 (24 Apr 2018 19:00) (18 - 66)  SpO2: 93% (24 Apr 2018 19:00) (90% - 99%)        ed.  Patient examined at approximately 0620 hrs. and again about 0900 hrs.  This patient has a right A-line in place also 75 blood pressure 143/57.  However right side moves very little.  She does have some sense and some minimal movement.  She recognizes that her primary doctor is Dr. Nahum Ledezma.  She is currently being treated with but percent salt solution at 50 cc/cc per hour.  Her daughter was in the room when we examined her.  We are going to start some beta blockers on her.  She has a right femoral line in place which we will remove his shortness the hypertonic salt solution is completed.  Her chest is clear she has no CC or E and her abdomen is soft.            I&O's Detail    23 Apr 2018 07:01  -  24 Apr 2018 07:00  --------------------------------------------------------  IN:    niCARdipine Infusion: 260 mL    sodium chloride 3%: 250 mL  Total IN: 510 mL    OUT:    Voided: 500 mL  Total OUT: 500 mL    Total NET: 10 mL      24 Apr 2018 07:01  -  24 Apr 2018 19:43  --------------------------------------------------------  IN:    niCARdipine Infusion: 175 mL    sodium chloride 3%: 400 mL    sodium chloride 3%: 60 mL  Total IN: 635 mL    OUT:    Indwelling Catheter - Urethral: 40 mL    Voided: 700 mL  Total OUT: 740 mL    Total NET: -105 mL            LABS:                        11.6   9.87  )-----------( 315      ( 24 Apr 2018 05:05 )             34.7     24 Apr 2018 16:40    144    |  109    |  11     ----------------------------<  125    3.8     |  23     |  0.5      Ca    8.5        24 Apr 2018 16:40  Mg     1.7       24 Apr 2018 05:05    TPro  6.1    /  Alb  3.8    /  TBili  0.4    /  DBili  <0.2   /  AST  16     /  ALT  18     /  AlkPhos  65     24 Apr 2018 05:05  Amylase x     lipase x          CARDIAC MARKERS ( 24 Apr 2018 05:05 )  x     / <0.01 ng/mL / 93 U/L / x     / 4.2 ng/mL  CARDIAC MARKERS ( 23 Apr 2018 18:59 )  x     / <0.01 ng/mL / 89 U/L / x     / x          CAPILLARY BLOOD GLUCOSE        PT/INR - ( 24 Apr 2018 05:05 )   PT: 11.80 sec;   INR: 1.09 ratio         PTT - ( 24 Apr 2018 05:05 )  PTT:25.2 sec    Culture        MEDICATIONS  (STANDING):  amLODIPine   Tablet 10 milliGRAM(s) Oral every 24 hours  atorvastatin 40 milliGRAM(s) Oral at bedtime  heparin  Injectable 5000 Unit(s) SubCutaneous every 8 hours  labetalol 100 milliGRAM(s) Oral three times a day  levETIRAcetam  IVPB 500 milliGRAM(s) IV Intermittent every 12 hours  niCARdipine Infusion 5 mG/Hr (25 mL/Hr) IV Continuous <Continuous>    MEDICATIONS  (PRN):  acetaminophen   Tablet. 650 milliGRAM(s) Oral every 6 hours PRN Mild Pain (1 - 3)        RADIOLOGY: ***     CXR:  TLC:  OG:  ET tube:          ECHO:      IMPRESSION:        PLAN:    CNS:    HEENT:    PULMONARY:    CARDIOVASCULAR:    GI: GI prophylaxis.  Feeding     RENAL:    INFECTIOUS DISEASE:    HEMATOLOGICAL:  DVT prophylaxis.    ENDOCRINE:  Follow up FS.  Insulin protocol if needed.    MUSCULOSKELETAL:        CRITICAL CARE TIME SPENT: ***

## 2018-04-24 NOTE — CONSULT NOTE ADULT - SUBJECTIVE AND OBJECTIVE BOX
CC: ICH    HPI:  83 yo Right handed female with PMH of HLD p/w s/p weak collapse  with right sided upper and lower extremity weakness.  Patient states that she was ironing her clothes when  her legs gave out and she was unable to get up due to weakness. Was on the floor for approximately 1 hour before her  found her and tried to help her up. Came  to the ED 3 hours after initial fall. In the ED patient found to be hypertensive /95, NIHSS 7, stroke code called and patient found to have an intraparenchymal bleed. In december 2017 patient had a mechanical fall due to sudden weakness, tripped over roadside curb while getting out of the car and had inability to ambulate weakness resolved spontaneously. Patient denies , head trauma, LOC, headache , n/v dizziness    Home medications  -asa 81 mg  -zocor 40 mg    Social hx   -ex smoker quit 3 months ago has h/o 40 pack yrs  -denies alcohol and drug use      Neuro Exam:  Orientation: oriented to person, place and time, able to name and repeat, able to recognize family ,speech fluent  Cranial Nerves: visual fields full to confrontation, pupils equal round and reactive to light, extraocular motion intact, facial sensation intact symmetrically, face symmetrical, hearing was intact bilaterally, tongue and palate midline, head turning and shoulder shrug symmetric and there was no tongue deviation with protrusion.   Motor:   LUE/LLE 4+/5                RUE 2/5  RLE 1-2/5   Sensory exam: Decreased sensation to pp and temp to the right  Coordination:. normal on the left , unable on the right  Plantar responses upgoing on the right  No neglect    NIHSS:     Allergies    Celebrex (Other (Moderate))  sulfonamides (Unknown)    Intolerances      MEDICATIONS  (STANDING):  atorvastatin 40 milliGRAM(s) Oral at bedtime  labetalol Injectable 10 milliGRAM(s) IV Push once  levETIRAcetam  IVPB 500 milliGRAM(s) IV Intermittent every 12 hours  niCARdipine Infusion 5 mG/Hr (25 mL/Hr) IV Continuous <Continuous>  sodium chloride 3%. 500 milliLiter(s) (50 mL/Hr) IV Continuous <Continuous>    MEDICATIONS  (PRN):  acetaminophen   Tablet. 650 milliGRAM(s) Oral every 6 hours PRN Mild Pain (1 - 3)      LABS:                        13.4   8.19  )-----------( 258      ( 23 Apr 2018 18:59 )             39.9     04-24    146  |  104  |  14  ----------------------------<  115<H>  3.5   |  26  |  0.6<L>    Ca    9.3      24 Apr 2018 00:22    TPro  6.7  /  Alb  4.1  /  TBili  0.3  /  DBili  x   /  AST  21  /  ALT  16  /  AlkPhos  74  04-23    PT/INR - ( 23 Apr 2018 18:59 )   PT: 11.00 sec;   INR: 1.02 ratio         PTT - ( 23 Apr 2018 18:59 )  PTT:29.0 sec        Neuro Imaging:  < from: CT Head No Cont (04.23.18 @ 19:10) >  Acute intracranial parenchymal hemorrhage within the left medial   posterior parietal region measuring approximately 3.4 cm. Mild   surrounding edema without significant mass effect.      < end of copied text >      EEG:     Echo:   Carotid Doppler: N/A  Telemetry: CC: ICH    HPI:  83 yo Right handed female with PMH of HLD p/w s/p weak collapse  with right sided upper and lower extremity weakness.  Patient states that she was ironing her clothes when  her legs gave out and she was unable to get up due to weakness. Was on the floor for approximately 1 hour before her  found her and tried to help her up. Came  to the ED 3 hours after initial fall. In the ED patient found to be hypertensive /95, NIHSS 7, stroke code called and patient found to have an intraparenchymal bleed. In december 2017 patient had a mechanical fall due to sudden weakness, tripped over roadside curb while getting out of the car and had inability to ambulate weakness resolved spontaneously. Patient denies , head trauma, LOC, headache , n/v dizziness. Patient given 2u of platelets to reverse aspirin and a loading dose of Keppra 1 gm for seizure prophylaxis while in ed    Home medications  -asa 81 mg  -zocor 40 mg    Social hx   -ex smoker quit 3 months ago has h/o 40 pack yrs  -denies alcohol and drug use      Neuro Exam:  Orientation: oriented to person, place and time, able to name and repeat, able to recognize family ,speech fluent  Cranial Nerves: visual fields full to confrontation, pupils equal round and reactive to light, extraocular motion intact, facial sensation intact symmetrically, face symmetrical, hearing was intact bilaterally, tongue and palate midline, head turning and shoulder shrug symmetric and there was no tongue deviation with protrusion.   Motor:   LUE/LLE 4+/5                RUE 2/5  RLE 1-2/5   Sensory exam: Decreased sensation to pp and temp to the right  Coordination:. normal on the left , unable on the right  Plantar responses upgoing on the right  No neglect    NIHSS:     Allergies    Celebrex (Other (Moderate))  sulfonamides (Unknown)    Intolerances      MEDICATIONS  (STANDING):  atorvastatin 40 milliGRAM(s) Oral at bedtime  labetalol Injectable 10 milliGRAM(s) IV Push once  levETIRAcetam  IVPB 500 milliGRAM(s) IV Intermittent every 12 hours  niCARdipine Infusion 5 mG/Hr (25 mL/Hr) IV Continuous <Continuous>  sodium chloride 3%. 500 milliLiter(s) (50 mL/Hr) IV Continuous <Continuous>    MEDICATIONS  (PRN):  acetaminophen   Tablet. 650 milliGRAM(s) Oral every 6 hours PRN Mild Pain (1 - 3)      LABS:                        13.4   8.19  )-----------( 258      ( 23 Apr 2018 18:59 )             39.9     04-24    146  |  104  |  14  ----------------------------<  115<H>  3.5   |  26  |  0.6<L>    Ca    9.3      24 Apr 2018 00:22    TPro  6.7  /  Alb  4.1  /  TBili  0.3  /  DBili  x   /  AST  21  /  ALT  16  /  AlkPhos  74  04-23    PT/INR - ( 23 Apr 2018 18:59 )   PT: 11.00 sec;   INR: 1.02 ratio         PTT - ( 23 Apr 2018 18:59 )  PTT:29.0 sec        Neuro Imaging:  < from: CT Head No Cont (04.23.18 @ 19:10) >  Acute intracranial parenchymal hemorrhage within the left medial   posterior parietal region measuring approximately 3.4 cm. Mild   surrounding edema without significant mass effect.      < end of copied text >      EEG:     Echo:   Carotid Doppler: N/A  Telemetry:

## 2018-04-24 NOTE — PROGRESS NOTE ADULT - ATTENDING COMMENTS
The Patient was seen at bedside.  The patient's daughter deferred neurological examination of the patient was sleeping.    I held a discussion with the patient's daughter regarding the CT findings.  Given the location of the left frontoparietal intracranial hemorrhage, suggest further imaging with MRI of the brain with and without contrast to ensure that there is no underlying lesion.  If follow-up CT scan demonstrates stable appearance of  intraparenchymal hemorrhage, okay to discontinue hyperosmolar therapy.  Continue with Keppra.    Plan for follow-up imaging was discussed with the patient's daughter. The Patient was seen at bedside.  The patient's daughter deferred neurological examination of the patient was sleeping.    I held a discussion with the patient's daughter regarding the CT findings.  Given the location of the left frontoparietal intracranial hemorrhage, suggest further imaging with MRI of the brain with and without contrast to ensure that there is no underlying lesion. Also consider MRA/MRV  Follow up CTH demonstrates stable appearance of  intraparenchymal hemorrhage, therefore, okay to discontinue hyperosmolar therapy.  Continue with Keppra. OK for subcutaneous heparin for DVT prophylaxis.    Plan for follow-up imaging was discussed with the patient's daughter.

## 2018-04-24 NOTE — PROGRESS NOTE ADULT - ASSESSMENT
Case discussed with team and daughter inpatient.  She has an intracranial bleed.  Hypertensive response.  On CIR DENE at present.  Plan is to observe blood pressure carefully observe for advancement of the bleed.*To convert her to oral antihypertensives will.

## 2018-04-24 NOTE — PROGRESS NOTE ADULT - SUBJECTIVE AND OBJECTIVE BOX
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  patient states that her legs gave out from underneath her and she was unable to get up due to weakness. Was on the floor for approximately 1 hour before her  found her and tried to help her up. came to the ED 3 hours after initial fall. In the ED patient found to be hypertensive /95, NIHSS 7, stroke code called and patient found to have an intraparenchymal bleed.   intially during interview patient denied any symptoms including headache but immediately upon leaving room patient states that she does have a frontal headache.    in december 2017 patient had a mechanical fall, tripped over roadside curb getting out of the car and had inability to ambulate. required assistance to get indoors. went to see PMD, had a swollen ankle, not acute fracture noted from event, weakness resolved spontaneously. (23 Apr 2018 23:10)    PAST MEDICAL & SURGICAL HISTORY  PAST MEDICAL & SURGICAL HISTORY:  HLD (hyperlipidemia)  No significant past surgical history    SOCIAL HISTORY:  quit smoking 3 months ptp (smoked 1ppd since 19y/o)  	no etoh use  	no drug use  lives at home with   completes all ADL's except driving    ALLERGIES:  Celebrex (Other (Moderate))  sulfonamides (Unknown)    MEDICATIONS:  STANDING MEDICATIONS  amLODIPine   Tablet 10 milliGRAM(s) Oral every 24 hours  atorvastatin 40 milliGRAM(s) Oral at bedtime  labetalol 100 milliGRAM(s) Oral three times a day  levETIRAcetam  IVPB 500 milliGRAM(s) IV Intermittent every 12 hours  niCARdipine Infusion 5 mG/Hr IV Continuous <Continuous>  sodium chloride 3%. 500 milliLiter(s) IV Continuous <Continuous>    PRN MEDICATIONS  acetaminophen   Tablet. 650 milliGRAM(s) Oral every 6 hours PRN    VITALS:   ICU Vital Signs Last 24 Hrs  T(C): 36.1 (24 Apr 2018 08:00), Max: 37.1 (23 Apr 2018 19:14)  T(F): 97 (24 Apr 2018 08:00), Max: 98.8 (23 Apr 2018 19:14)  HR: 84 (24 Apr 2018 09:30) (68 - 116)  BP: 142/65 (24 Apr 2018 03:15) (142/65 - 210/95)  BP(mean): 98 (24 Apr 2018 03:15) (98 - 98)  ABP: 140/52 (24 Apr 2018 09:30) (116/40 - 194/56)  ABP(mean): 82 (24 Apr 2018 09:30) (62 - 120)  RR: 22 (24 Apr 2018 09:00) (18 - 63)  SpO2: 94% (24 Apr 2018 09:30) (93% - 99%)    I&O's Detail    23 Apr 2018 07:01  -  24 Apr 2018 07:00  --------------------------------------------------------  IN:    niCARdipine Infusion: 260 mL    sodium chloride 3%.: 250 mL  Total IN: 510 mL    OUT:    Voided: 500 mL  Total OUT: 500 mL    Total NET: 10 mL      24 Apr 2018 07:01  -  24 Apr 2018 10:46  --------------------------------------------------------  IN:    niCARdipine Infusion: 100 mL    sodium chloride 3%.: 150 mL  Total IN: 250 mL    OUT:  Total OUT: 0 mL    Total NET: 250 mL            LABS:                        11.6   9.87  )-----------( 315      ( 24 Apr 2018 05:05 )             34.7     04-24    145  |  107  |  15  ----------------------------<  134<H>  3.5   |  26  |  0.6<L>    Ca    8.8      24 Apr 2018 05:05  Mg     1.7     04-24    TPro  6.1  /  Alb  3.8  /  TBili  0.4  /  DBili  <0.2  /  AST  16  /  ALT  18  /  AlkPhos  65  04-24    PT/INR - ( 24 Apr 2018 05:05 )   PT: 11.80 sec;   INR: 1.09 ratio         PTT - ( 24 Apr 2018 05:05 )  PTT:25.2 sec      Creatine Kinase, Serum: 93 U/L (04-24-18 @ 05:05)  Troponin T, Serum: <0.01 ng/mL (04-24-18 @ 05:05)  Creatine Kinase, Serum: 89 U/L (04-23-18 @ 18:59)  Troponin T, Serum: <0.01 ng/mL (04-23-18 @ 18:59)      CARDIAC MARKERS ( 24 Apr 2018 05:05 )  x     / <0.01 ng/mL / 93 U/L / x     / 4.2 ng/mL  CARDIAC MARKERS ( 23 Apr 2018 18:59 )  x     / <0.01 ng/mL / 89 U/L / x     / x          RADIOLOGY:  < from: CT Head No Cont (04.23.18 @ 19:10) >  IMPRESSION:    Acute intracranial parenchymal hemorrhage within the left medial   posterior parietal region measuring approximately 3.4 cm. Mild   surrounding edema without significant mass effect.    < end of copied text >    PHYSICAL EXAM:  GEN: No acute distress  HEENT: within  normal range  LUNGS: Clear to auscultation bilaterally   HEART: S1/S2 present. RRR.   ABD: Soft, non-tender, non-distended. Bowel sounds present  EXT:no LE edema  NEURO:  right lower extremity 0/5 and  no sensation, left lower extremity 5/5 strength, right upper extremity 2/5 stregth, left upper  extremity 5/5 strength  AAOx2 (person and place) LENGTH OF HOSPITAL STAY: 1d    CHIEF COMPLAINT:   Patient is a 82y old  Female who presents with a chief complaint of intracranial parenchymal hemorrhage (23 Apr 2018 23:10)       EVENTS OVERNIGHT:none ,stable,still on Nicardipine drip    HISTORY OF PRESENTING ILLNESS:    HPI:  85 yo female with PMH of HLD 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  patient states that her legs gave out from underneath her and she was unable to get up due to weakness. Was on the floor for approximately 1 hour before her  found her and tried to help her up. came to the ED 3 hours after initial fall. In the ED patient found to be hypertensive /95, NIHSS 7, stroke code called and patient found to have an intraparenchymal bleed.   intially during interview patient denied any symptoms including headache but immediately upon leaving room patient states that she does have a frontal headache.    in december 2017 patient had a mechanical fall, tripped over roadside curb getting out of the car and had inability to ambulate. required assistance to get indoors. went to see PMD, had a swollen ankle, not acute fracture noted from event, weakness resolved spontaneously. (23 Apr 2018 23:10)    PAST MEDICAL & SURGICAL HISTORY  PAST MEDICAL & SURGICAL HISTORY:  HLD (hyperlipidemia)  No significant past surgical history    SOCIAL HISTORY:  quit smoking 3 months ptp (smoked 1ppd since 17y/o)  	no etoh use  	no drug use  lives at home with   completes all ADL's except driving    ALLERGIES:  Celebrex (Other (Moderate))  sulfonamides (Unknown)    MEDICATIONS:  STANDING MEDICATIONS  amLODIPine   Tablet 10 milliGRAM(s) Oral every 24 hours  atorvastatin 40 milliGRAM(s) Oral at bedtime  labetalol 100 milliGRAM(s) Oral three times a day  levETIRAcetam  IVPB 500 milliGRAM(s) IV Intermittent every 12 hours  niCARdipine Infusion 5 mG/Hr IV Continuous <Continuous>  sodium chloride 3%. 500 milliLiter(s) IV Continuous <Continuous>    PRN MEDICATIONS  acetaminophen   Tablet. 650 milliGRAM(s) Oral every 6 hours PRN    VITALS:   ICU Vital Signs Last 24 Hrs  T(C): 36.1 (24 Apr 2018 08:00), Max: 37.1 (23 Apr 2018 19:14)  T(F): 97 (24 Apr 2018 08:00), Max: 98.8 (23 Apr 2018 19:14)  HR: 84 (24 Apr 2018 09:30) (68 - 116)  BP: 142/65 (24 Apr 2018 03:15) (142/65 - 210/95)  BP(mean): 98 (24 Apr 2018 03:15) (98 - 98)  ABP: 140/52 (24 Apr 2018 09:30) (116/40 - 194/56)  ABP(mean): 82 (24 Apr 2018 09:30) (62 - 120)  RR: 22 (24 Apr 2018 09:00) (18 - 63)  SpO2: 94% (24 Apr 2018 09:30) (93% - 99%)    I&O's Detail    23 Apr 2018 07:01  -  24 Apr 2018 07:00  --------------------------------------------------------  IN:    niCARdipine Infusion: 260 mL    sodium chloride 3%.: 250 mL  Total IN: 510 mL    OUT:    Voided: 500 mL  Total OUT: 500 mL    Total NET: 10 mL      24 Apr 2018 07:01  -  24 Apr 2018 10:46  --------------------------------------------------------  IN:    niCARdipine Infusion: 100 mL    sodium chloride 3%.: 150 mL  Total IN: 250 mL    OUT:  Total OUT: 0 mL    Total NET: 250 mL            LABS:                        11.6   9.87  )-----------( 315      ( 24 Apr 2018 05:05 )             34.7     04-24    145  |  107  |  15  ----------------------------<  134<H>  3.5   |  26  |  0.6<L>    Ca    8.8      24 Apr 2018 05:05  Mg     1.7     04-24    TPro  6.1  /  Alb  3.8  /  TBili  0.4  /  DBili  <0.2  /  AST  16  /  ALT  18  /  AlkPhos  65  04-24    PT/INR - ( 24 Apr 2018 05:05 )   PT: 11.80 sec;   INR: 1.09 ratio         PTT - ( 24 Apr 2018 05:05 )  PTT:25.2 sec      Creatine Kinase, Serum: 93 U/L (04-24-18 @ 05:05)  Troponin T, Serum: <0.01 ng/mL (04-24-18 @ 05:05)  Creatine Kinase, Serum: 89 U/L (04-23-18 @ 18:59)  Troponin T, Serum: <0.01 ng/mL (04-23-18 @ 18:59)      CARDIAC MARKERS ( 24 Apr 2018 05:05 )  x     / <0.01 ng/mL / 93 U/L / x     / 4.2 ng/mL  CARDIAC MARKERS ( 23 Apr 2018 18:59 )  x     / <0.01 ng/mL / 89 U/L / x     / x          RADIOLOGY:  < from: CT Head No Cont (04.23.18 @ 19:10) >  IMPRESSION:    Acute intracranial parenchymal hemorrhage within the left medial   posterior parietal region measuring approximately 3.4 cm. Mild   surrounding edema without significant mass effect.    < end of copied text >    PHYSICAL EXAM:  GEN: No acute distress  HEENT: within  normal range  LUNGS: Clear to auscultation bilaterally   HEART: S1/S2 present. RRR.   ABD: Soft, non-tender, non-distended. Bowel sounds present  EXT:no LE edema  NEURO:  right lower extremity 0/5 and  no sensation, left lower extremity 5/5 strength, right upper extremity 2/5 stregth, left upper  extremity 5/5 strength  AAOx3 (person,time  and place)

## 2018-04-25 LAB
ABO RH CONFIRMATION: SIGNIFICANT CHANGE UP
ALBUMIN SERPL ELPH-MCNC: 3.7 G/DL — SIGNIFICANT CHANGE UP (ref 3.5–5.2)
ALP SERPL-CCNC: 66 U/L — SIGNIFICANT CHANGE UP (ref 30–115)
ALT FLD-CCNC: 13 U/L — SIGNIFICANT CHANGE UP (ref 0–41)
ANION GAP SERPL CALC-SCNC: 12 MMOL/L — SIGNIFICANT CHANGE UP (ref 7–14)
AST SERPL-CCNC: 14 U/L — SIGNIFICANT CHANGE UP (ref 0–41)
BASOPHILS # BLD AUTO: 0.03 K/UL — SIGNIFICANT CHANGE UP (ref 0–0.2)
BASOPHILS NFR BLD AUTO: 0.3 % — SIGNIFICANT CHANGE UP (ref 0–1)
BILIRUB SERPL-MCNC: 0.5 MG/DL — SIGNIFICANT CHANGE UP (ref 0.2–1.2)
BUN SERPL-MCNC: 9 MG/DL — LOW (ref 10–20)
CALCIUM SERPL-MCNC: 8.7 MG/DL — SIGNIFICANT CHANGE UP (ref 8.5–10.1)
CHLORIDE SERPL-SCNC: 108 MMOL/L — SIGNIFICANT CHANGE UP (ref 98–110)
CO2 SERPL-SCNC: 26 MMOL/L — SIGNIFICANT CHANGE UP (ref 17–32)
CREAT SERPL-MCNC: 0.6 MG/DL — LOW (ref 0.7–1.5)
EOSINOPHIL # BLD AUTO: 4.43 K/UL — HIGH (ref 0–0.7)
EOSINOPHIL NFR BLD AUTO: 48.5 % — HIGH (ref 0–8)
GLUCOSE SERPL-MCNC: 120 MG/DL — HIGH (ref 70–99)
HCT VFR BLD CALC: 34.4 % — LOW (ref 37–47)
HGB BLD-MCNC: 11.4 G/DL — LOW (ref 12–16)
IMM GRANULOCYTES NFR BLD AUTO: 0.4 % — HIGH (ref 0.1–0.3)
LYMPHOCYTES # BLD AUTO: 0.83 K/UL — LOW (ref 1.2–3.4)
LYMPHOCYTES # BLD AUTO: 9.1 % — LOW (ref 20.5–51.1)
MAGNESIUM SERPL-MCNC: 1.7 MG/DL — LOW (ref 1.8–2.4)
MCHC RBC-ENTMCNC: 27.4 PG — SIGNIFICANT CHANGE UP (ref 27–31)
MCHC RBC-ENTMCNC: 33.1 G/DL — SIGNIFICANT CHANGE UP (ref 32–37)
MCV RBC AUTO: 82.7 FL — SIGNIFICANT CHANGE UP (ref 81–99)
MONOCYTES # BLD AUTO: 0.39 K/UL — SIGNIFICANT CHANGE UP (ref 0.1–0.6)
MONOCYTES NFR BLD AUTO: 4.3 % — SIGNIFICANT CHANGE UP (ref 1.7–9.3)
NEUTROPHILS # BLD AUTO: 3.41 K/UL — SIGNIFICANT CHANGE UP (ref 1.4–6.5)
NEUTROPHILS NFR BLD AUTO: 37.4 % — LOW (ref 42.2–75.2)
PLATELET # BLD AUTO: 297 K/UL — SIGNIFICANT CHANGE UP (ref 130–400)
POTASSIUM SERPL-MCNC: 3.7 MMOL/L — SIGNIFICANT CHANGE UP (ref 3.5–5)
POTASSIUM SERPL-SCNC: 3.7 MMOL/L — SIGNIFICANT CHANGE UP (ref 3.5–5)
PROT SERPL-MCNC: 6 G/DL — SIGNIFICANT CHANGE UP (ref 6–8)
RBC # BLD: 4.16 M/UL — LOW (ref 4.2–5.4)
RBC # FLD: 13.5 % — SIGNIFICANT CHANGE UP (ref 11.5–14.5)
SODIUM SERPL-SCNC: 146 MMOL/L — SIGNIFICANT CHANGE UP (ref 135–146)
WBC # BLD: 9.13 K/UL — SIGNIFICANT CHANGE UP (ref 4.8–10.8)
WBC # FLD AUTO: 9.13 K/UL — SIGNIFICANT CHANGE UP (ref 4.8–10.8)

## 2018-04-25 RX ORDER — LABETALOL HCL 100 MG
200 TABLET ORAL EVERY 8 HOURS
Qty: 0 | Refills: 0 | Status: DISCONTINUED | OUTPATIENT
Start: 2018-04-25 | End: 2018-04-30

## 2018-04-25 RX ADMIN — LEVETIRACETAM 420 MILLIGRAM(S): 250 TABLET, FILM COATED ORAL at 17:41

## 2018-04-25 RX ADMIN — Medication 100 MILLIGRAM(S): at 05:27

## 2018-04-25 RX ADMIN — AMLODIPINE BESYLATE 10 MILLIGRAM(S): 2.5 TABLET ORAL at 09:55

## 2018-04-25 RX ADMIN — HEPARIN SODIUM 5000 UNIT(S): 5000 INJECTION INTRAVENOUS; SUBCUTANEOUS at 05:28

## 2018-04-25 RX ADMIN — Medication 200 MILLIGRAM(S): at 21:12

## 2018-04-25 RX ADMIN — HEPARIN SODIUM 5000 UNIT(S): 5000 INJECTION INTRAVENOUS; SUBCUTANEOUS at 14:30

## 2018-04-25 RX ADMIN — Medication 200 MILLIGRAM(S): at 13:21

## 2018-04-25 RX ADMIN — LEVETIRACETAM 420 MILLIGRAM(S): 250 TABLET, FILM COATED ORAL at 05:29

## 2018-04-25 RX ADMIN — HEPARIN SODIUM 5000 UNIT(S): 5000 INJECTION INTRAVENOUS; SUBCUTANEOUS at 21:12

## 2018-04-25 RX ADMIN — ATORVASTATIN CALCIUM 40 MILLIGRAM(S): 80 TABLET, FILM COATED ORAL at 21:13

## 2018-04-25 RX ADMIN — NICARDIPINE HYDROCHLORIDE 25 MG/HR: 30 CAPSULE, EXTENDED RELEASE ORAL at 07:00

## 2018-04-25 NOTE — PROGRESS NOTE ADULT - ASSESSMENT
· Assessment      83 yo female p/w fall and right sideded weakness, found to have intracranial  intraparenchymal hemorrhage    SYSTEM BASED PLAN:    Neurologic:       Sedation-none       Analgesia-    ce Neuro check Q2  neuro and neuro surgery follow up  cw keppra and 3% saline  check BMP at all lab times  target  155-150 sodium  repeat CT head    Cardiovascular:       Vasopressors - none  on Nicardipine drip; try to wean down  switch to Labetalol and amlodipine PO  target blood pressure less than 140 systolic  follow 2 D echo  Pulmonary:       Ventilator/NIV settings - none    Gastrointestinal:       Nutrition- speech and swallow eval    Genitourinary/Renal:       Goal fluid balance-evenIV fluids-3 %saline       Electrolytes-    Infectious Disease:       Antimicrobials- none    Hematology/Oncology:  ask NS about DVT ppx clearance  cw compression device for now  Endocrine:       Insulin regimen - if needed    Musculoskeletal:       Activity-       Physical Therapy-when stable Skin/decub ulcers- none    Indwelling vascular catheters:  femoral line    Urinary Catheter: none     Full code    Disposition:    Code Status:FULL CODE · Assessment		  81 yo female p/w fall and right sideded weakness, found to have intracranial  intraparenchymal hemorrhage    SYSTEM BASED PLAN:    Neurologic:       Sedation-none       Analgesia-none    cw Neuro check Q4 now  neuro and neuro surgery follow up  cw keppra and no need of hypertonic saline as per NS  need MRI,MRA and MRV as per neurosurgery    Cardiovascular:       Vasopressors - none  on Nicardipine drip; try to wean down  switch to Labetalol and amlodipine PO  target blood pressure less than 140 systolic  labetalol dose increased today    Pulmonary:       Ventilator/NIV settings - none    Gastrointestinal:       Nutrition- mechanical soft diet as per speech and swallow    Genitourinary/Renal:       Goal fluid balance- even       Electrolytes-replete if needed    Infectious Disease:       Antimicrobials- none    Hematology/Oncology:  sub Q heprin as per NS  Endocrine:       Insulin regimen - if needed    Musculoskeletal:       Activity-as tolerated       Physical Therapy-PT /rehab consulted  Skin/decub ulcers- none    Indwelling vascular catheters:  femoral line  and arterial line  d/c once off Nicardipine drip    Urinary Catheter: cw [place]      Disposition:  possible down grade later to stroke unit ,once off Nicardipine drip    Code Status:FULL CODE

## 2018-04-25 NOTE — PROGRESS NOTE ADULT - SUBJECTIVE AND OBJECTIVE BOX
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  patient states that her legs gave out from underneath her and she was unable to get up due to weakness. Was on the floor for approximately 1 hour before her  found her and tried to help her up. came to the ED 3 hours after initial fall. In the ED patient found to be hypertensive /95, NIHSS 7, stroke code called and patient found to have an intraparenchymal bleed.   intially during interview patient denied any symptoms including headache but immediately upon leaving room patient states that she does have a frontal headache.    in december 2017 patient had a mechanical fall, tripped over roadside curb getting out of the car and had inability to ambulate. required assistance to get indoors. went to see PMD, had a swollen ankle, not acute fracture noted from event, weakness resolved spontaneously. (23 Apr 2018 23:10)    PAST MEDICAL & SURGICAL HISTORY  PAST MEDICAL & SURGICAL HISTORY:  HLD (hyperlipidemia)  No significant past surgical history    SOCIAL HISTORY:  quit smoking recently  20 Pack yr hx    ALLERGIES:  Celebrex (Other (Moderate))  sulfonamides (Unknown)    MEDICATIONS:  STANDING MEDICATIONS  amLODIPine   Tablet 10 milliGRAM(s) Oral every 24 hours  atorvastatin 40 milliGRAM(s) Oral at bedtime  heparin  Injectable 5000 Unit(s) SubCutaneous every 8 hours  labetalol 200 milliGRAM(s) Oral every 8 hours  levETIRAcetam  IVPB 500 milliGRAM(s) IV Intermittent every 12 hours  niCARdipine Infusion 5 mG/Hr IV Continuous <Continuous>    PRN MEDICATIONS  acetaminophen   Tablet. 650 milliGRAM(s) Oral every 6 hours PRN    VITALS:   ICU Vital Signs Last 24 Hrs  T(C): 36.8 (25 Apr 2018 08:00), Max: 37.1 (25 Apr 2018 00:00)  T(F): 98.3 (25 Apr 2018 08:00), Max: 98.7 (25 Apr 2018 00:00)  HR: 78 (25 Apr 2018 09:15) (68 - 94)  BP: --  BP(mean): --  ABP: 154/52 (25 Apr 2018 09:15) (114/38 - 158/70)  ABP(mean): 84 (25 Apr 2018 09:15) (64 - 100)  RR: 28 (25 Apr 2018 09:15) (14 - 58)  SpO2: 91% (25 Apr 2018 09:15) (85% - 97%)      I&O's Detail    24 Apr 2018 07:01  -  25 Apr 2018 07:00  --------------------------------------------------------  IN:    IV PiggyBack: 100 mL    niCARdipine Infusion: 500 mL    sodium chloride 3%: 400 mL    sodium chloride 3%: 60 mL  Total IN: 1060 mL    OUT:    Indwelling Catheter - Urethral: 970 mL    Voided: 700 mL  Total OUT: 1670 mL    Total NET: -610 mL          LABS:                        11.4   9.13  )-----------( 297      ( 25 Apr 2018 05:23 )             34.4     04-25    146  |  108  |  9<L>  ----------------------------<  120<H>  3.7   |  26  |  0.6<L>    Ca    8.7      25 Apr 2018 05:23  Mg     1.7     04-25    TPro  6.0  /  Alb  3.7  /  TBili  0.5  /  DBili  x   /  AST  14  /  ALT  13  /  AlkPhos  66  04-25    PT/INR - ( 24 Apr 2018 05:05 )   PT: 11.80 sec;   INR: 1.09 ratio         PTT - ( 24 Apr 2018 05:05 )  PTT:25.2 sec          CARDIAC MARKERS ( 24 Apr 2018 05:05 )  x     / <0.01 ng/mL / 93 U/L / x     / 4.2 ng/mL  CARDIAC MARKERS ( 23 Apr 2018 18:59 )  x     / <0.01 ng/mL / 89 U/L / x     / x          RADIOLOGY:       < from: CT Head No Cont (04.24.18 @ 16:02) >  MPRESSION:    No significant interval change in the left parietal lobar parenchymal   hematoma measuring up to 4.5 cm (AP) with mild surrounding edema and   partial compression of the left lateral ventricle.      < end of copied text >        PHYSICAL EXAM:  GEN: No acute distress  HEENT: within normal range  LUNGS: Clear to auscultation bilaterally   HEART: S1/S2 present. RRR.   ABD: Soft, non-tender, non-distended. Bowel sounds present  EXT:no LE edema  NEURO: AAOX3,rt LE 0/5  ,RT UE 2/5,decrease sensation on RT side  Left UE and LE 5/5  no aphasia ,but word finding difficulty LENGTH OF HOSPITAL STAY: 2d    CHIEF COMPLAINT:   Patient is a 82y old  Female who presents with a chief complaint of intracranial parenchymal hemorrhage (23 Apr 2018 23:10)    EVENTS OVERNIGHT;   got CT H yesterday,  stable hemorrhage  d/mike 3% saline as per Neuro and Sub q heparin started  still on Nicardipine drip  [place] was placed yesterday coz of urine retention    HISTORY OF PRESENTING ILLNESS:    HPI:  83 yo female with PMH of HLD 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  patient states that her legs gave out from underneath her and she was unable to get up due to weakness. Was on the floor for approximately 1 hour before her  found her and tried to help her up. came to the ED 3 hours after initial fall. In the ED patient found to be hypertensive /95, NIHSS 7, stroke code called and patient found to have an intraparenchymal bleed.   intially during interview patient denied any symptoms including headache but immediately upon leaving room patient states that she does have a frontal headache.    in december 2017 patient had a mechanical fall, tripped over roadside curb getting out of the car and had inability to ambulate. required assistance to get indoors. went to see PMD, had a swollen ankle, not acute fracture noted from event, weakness resolved spontaneously. (23 Apr 2018 23:10)    PAST MEDICAL & SURGICAL HISTORY  PAST MEDICAL & SURGICAL HISTORY:  HLD (hyperlipidemia)  No significant past surgical history    SOCIAL HISTORY:  quit smoking recently  20 Pack yr hx    ALLERGIES:  Celebrex (Other (Moderate))  sulfonamides (Unknown)    MEDICATIONS:  STANDING MEDICATIONS  amLODIPine   Tablet 10 milliGRAM(s) Oral every 24 hours  atorvastatin 40 milliGRAM(s) Oral at bedtime  heparin  Injectable 5000 Unit(s) SubCutaneous every 8 hours  labetalol 200 milliGRAM(s) Oral every 8 hours  levETIRAcetam  IVPB 500 milliGRAM(s) IV Intermittent every 12 hours  niCARdipine Infusion 5 mG/Hr IV Continuous <Continuous>    PRN MEDICATIONS  acetaminophen   Tablet. 650 milliGRAM(s) Oral every 6 hours PRN    VITALS:   ICU Vital Signs Last 24 Hrs  T(C): 36.8 (25 Apr 2018 08:00), Max: 37.1 (25 Apr 2018 00:00)  T(F): 98.3 (25 Apr 2018 08:00), Max: 98.7 (25 Apr 2018 00:00)  HR: 78 (25 Apr 2018 09:15) (68 - 94)  BP: --  BP(mean): --  ABP: 154/52 (25 Apr 2018 09:15) (114/38 - 158/70)  ABP(mean): 84 (25 Apr 2018 09:15) (64 - 100)  RR: 28 (25 Apr 2018 09:15) (14 - 58)  SpO2: 91% (25 Apr 2018 09:15) (85% - 97%)      I&O's Detail    24 Apr 2018 07:01  -  25 Apr 2018 07:00  --------------------------------------------------------  IN:    IV PiggyBack: 100 mL    niCARdipine Infusion: 500 mL    sodium chloride 3%: 400 mL    sodium chloride 3%: 60 mL  Total IN: 1060 mL    OUT:    Indwelling Catheter - Urethral: 970 mL    Voided: 700 mL  Total OUT: 1670 mL    Total NET: -610 mL          LABS:                        11.4   9.13  )-----------( 297      ( 25 Apr 2018 05:23 )             34.4     04-25    146  |  108  |  9<L>  ----------------------------<  120<H>  3.7   |  26  |  0.6<L>    Ca    8.7      25 Apr 2018 05:23  Mg     1.7     04-25    TPro  6.0  /  Alb  3.7  /  TBili  0.5  /  DBili  x   /  AST  14  /  ALT  13  /  AlkPhos  66  04-25    PT/INR - ( 24 Apr 2018 05:05 )   PT: 11.80 sec;   INR: 1.09 ratio         PTT - ( 24 Apr 2018 05:05 )  PTT:25.2 sec          CARDIAC MARKERS ( 24 Apr 2018 05:05 )  x     / <0.01 ng/mL / 93 U/L / x     / 4.2 ng/mL  CARDIAC MARKERS ( 23 Apr 2018 18:59 )  x     / <0.01 ng/mL / 89 U/L / x     / x          RADIOLOGY:       < from: CT Head No Cont (04.24.18 @ 16:02) >  MPRESSION:    No significant interval change in the left parietal lobar parenchymal   hematoma measuring up to 4.5 cm (AP) with mild surrounding edema and   partial compression of the left lateral ventricle.      < end of copied text >        PHYSICAL EXAM:  GEN: No acute distress  HEENT: within normal range  LUNGS: Clear to auscultation bilaterally   HEART: S1/S2 present. RRR.   ABD: Soft, non-tender, non-distended. Bowel sounds present  EXT:no LE edema  NEURO: AAOX3,rt LE 0/5  ,RT UE 2/5,decrease sensation on RT side  Left UE and LE 5/5  no aphasia ,but word finding difficulty

## 2018-04-25 NOTE — PROGRESS NOTE ADULT - ATTENDING COMMENTS
Patient seen and examined agree with above except as noted.  Patient stable with right hemiparesis and intermittent aphasia    Plan  1. Neurochecks  2. Keep -130's  3. Repeat CTH tomorrow and may do MRI brain w/w/o ROBERT in 24-48 hours  4. When downgraded from ICU will need PT/OT

## 2018-04-25 NOTE — PROGRESS NOTE ADULT - ASSESSMENT
83 yo Right handed female with PMH of HLD p/w s/p weak collapse  with right sided upper and lower extremity weakness .Found to have Acute ICH.  Stable left parietal IPH on repeat CTH. Daughter stated that patient noted to be having problems word finding but no aphasia noted on this exam.     pLAN  -Continue holding anticoagulants and antiplatelets  -Neurosx follow up  -keep sbp 120-130  -neuro checks   -Continue Keppra 500 mg bid

## 2018-04-25 NOTE — PROGRESS NOTE ADULT - ATTENDING COMMENTS
stable ICH on follow up CT.  Patient neurologically stable.  OK for floor and neurology followup for ICH and stroke rehab.  Keppra x 2 weeks.  No anticoagulation x 2 weeks.  No further neurosurgical intervention.

## 2018-04-25 NOTE — PROGRESS NOTE ADULT - SUBJECTIVE AND OBJECTIVE BOX
Neurology Follow up   Patient seen and examined at bedside, RT sided weakness persists . As per daughter patient has occasional difficulty word finding but not noted at this time . Patient although restless is able to follow commands and answer questions at this time. No acute events overnight.       Vital Signs Last 24 Hrs  T(C): 37.1 (25 Apr 2018 00:00), Max: 37.1 (25 Apr 2018 00:00)  T(F): 98.7 (25 Apr 2018 00:00), Max: 98.7 (25 Apr 2018 00:00)  HR: 90 (25 Apr 2018 02:30) (68 - 94)  BP: --  BP(mean): --  RR: 45 (25 Apr 2018 02:30) (15 - 66)  SpO2: 93% (25 Apr 2018 02:30) (90% - 97%)      Neurological Exam:   Orientation: oriented to person, place and time, able to name and repeat, able to recognize family ,speech occasionally slurred. able to name her daughter at bedside and repeat words and sentences.  Cranial Nerves: visual fields full to confrontation, pupils equal round and reactive to light, extraocular motion intact, facial sensation intact symmetrically, face symmetrical, hearing was intact bilaterally, tongue and palate midline, head turning and shoulder shrug symmetric and there was no tongue deviation with protrusion.   Motor:   LUE/LLE 4+/5                RUE 0/5(fluctuates)  RLE 1-2/5 (only moves to pain stimulus)  Sensory exam: Decreased sensation to pp and temp to the right  Coordination:. normal on the left , unable on the right  Plantar responses upgoing on the right  No neglect      Medications  acetaminophen   Tablet. 650 milliGRAM(s) Oral every 6 hours PRN  amLODIPine   Tablet 10 milliGRAM(s) Oral every 24 hours  atorvastatin 40 milliGRAM(s) Oral at bedtime  heparin  Injectable 5000 Unit(s) SubCutaneous every 8 hours  labetalol 100 milliGRAM(s) Oral three times a day  levETIRAcetam  IVPB 500 milliGRAM(s) IV Intermittent every 12 hours  niCARdipine Infusion 5 mG/Hr IV Continuous <Continuous>      Lab  Basic Metabolic Panel (04.24.18 @ 16:40)    Sodium, Serum: 144 mmol/L    Potassium, Serum: 3.8 mmol/L    Chloride, Serum: 109 mmol/L    Carbon Dioxide, Serum: 23 mmol/L    Anion Gap, Serum: 12 mmol/L    Blood Urea Nitrogen, Serum: 11 mg/dL    Creatinine, Serum: 0.5 mg/dL    Glucose, Serum: 125 mg/dL    Calcium, Total Serum: 8.5 mg/dL      Creatine Kinase, Serum (04.24.18 @ 05:05)    Creatine Kinase, Serum: 93 U/L    Hepatic Function Panel (04.24.18 @ 05:05)    Indirect Reacting Bilirubin: >0.2 mg/dL    Protein Total, Serum: 6.1 g/dL    Albumin, Serum: 3.8 g/dL    Bilirubin Total, Serum: 0.4 mg/dL    Bilirubin Direct, Serum: <0.2 mg/dL    Alkaline Phosphatase, Serum: 65 U/L    Aspartate Aminotransferase (AST/SGOT): 16 U/L    Alanine Aminotransferase (ALT/SGPT): 18 U/L    Complete Blood Count + Automated Diff (04.24.18 @ 05:05)    WBC Count: 9.87 K/uL    RBC Count: 4.24 M/uL    Hemoglobin: 11.6 g/dL    Hematocrit: 34.7 %    Mean Cell Volume: 81.8 fL    Mean Cell Hemoglobin: 27.4 pg    Mean Cell Hemoglobin Conc: 33.4 g/dL    Red Cell Distrib Width: 13.3 %    Platelet Count - Automated: 315 K/uL    Auto Neutrophil #: 5.32 K/uL    Auto Lymphocyte #: 1.01 K/uL    Auto Monocyte #: 0.51 K/uL    Auto Eosinophil #: 2.96 K/uL    Auto Basophil #: 0.03 K/uL    Auto Neutrophil %: 53.9: Differential percentages must be correlated with absolute numbers for  clinical significance. %    Auto Lymphocyte %: 10.2 %    Auto Monocyte %: 5.2 %    Auto Eosinophil %: 30.0 %    Auto Basophil %: 0.3 %    Auto Immature Granulocyte %: 0.4 %          Radiology  < from: CT Head No Cont (04.24.18 @ 16:02) >  Findings:    There is motion artifact.    There is no significant interval change in the size of a left parietal   lobar parenchymal hematoma measuring up to 4.5 cm in AP diameter   (previously 4.6 cm when measured at a similar level). Stable mild   surrounding edema and compression of the left lateral ventricle.    There are stable confluent hypodensities in the cerebral hemispheric   white matter consistent with chronic microvascular change.    There is no midline shift. There is calcific atherosclerotic disease at   the skull base.    The calvarium is intact. The visualized paranasal sinuses and mastoids   are well-aerated.    IMPRESSION:    No significant interval change in the left parietal lobar parenchymal   hematoma measuring up to 4.5 cm (AP) with mild surrounding edema and   partial compression of the left lateral ventricle.    < end of copied text >

## 2018-04-25 NOTE — CHART NOTE - NSCHARTNOTEFT_GEN_A_CORE
ICU DOWNGRADE NOTE:    82y Female transferred to floor from ICU    Patient is a 82y old Female who presents with a chief complaint of intracranial parenchymal hemorrhage (23 Apr 2018 23:10)    The patient is currently admitted for the primary diagnosis of Intraparenchymal hemorrhage of brain    The patient was admitted to the unit for 3 Days.    The patient (was never) intubated for (days), and (was never) on pressors for (days).    Indwelling vascular catheters: peripheral IVs    Urinary Catheter: gonzales (retained urine yesterday)    Disposition:medical floor    Code Status: full code    ICU COURSE OF EVENTS:  -------------------------------------------------------------------------------------------  Patient is a 82y old  Female who presents with a chief complaint of intracranial parenchymal hemorrhage (23 Apr 2018 23:10)    EVENTS OVERNIGHT;   got CT H yesterday,  stable hemorrhage  d/mike 3% saline as per Neuro and Sub q heparin started  still on Nicardipine drip  Gonzales was placed yesterday coz of urine retention    HISTORY OF PRESENTING ILLNESS:    HPI:  83 yo female with PMH of HLD 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  patient states that her legs gave out from underneath her and she was unable to get up due to weakness. Was on the floor for approximately 1 hour before her  found her and tried to help her up. came to the ED 3 hours after initial fall. In the ED patient found to be hypertensive /95, NIHSS 7, stroke code called and patient found to have an intraparenchymal bleed.   intially during interview patient denied any symptoms including headache but immediately upon leaving room patient states that she does have a frontal headache.    in december 2017 patient had a mechanical fall, tripped over roadside curb getting out of the car and had inability to ambulate. required assistance to get indoors. went to see PMD, had a swollen ankle, not acute fracture noted from event, weakness resolved spontaneously. (23 Apr 2018 23:10)    PAST MEDICAL & SURGICAL HISTORY  PAST MEDICAL & SURGICAL HISTORY:  HLD (hyperlipidemia)  No significant past surgical history    SOCIAL HISTORY:  quit smoking recently  20 Pack yr hx    ALLERGIES:  Celebrex (Other (Moderate))  sulfonamides (Unknown)    PHYSICAL EXAM:  GEN: No acute distress  HEENT: within normal range  LUNGS: Clear to auscultation bilaterally   HEART: S1/S2 present. RRR.   ABD: Soft, non-tender, non-distended. Bowel sounds present  EXT:no LE edema  NEURO: AAOX3,rt LE 0/5  ,RT UE 2/5,decrease sensation on RT sideLeft UE and LE 5/5  no aphasia ,but word finding difficulty  Assessment and Plan:   · Assessment    · Assessment    81 yo female p/w fall and right sideded weakness, found to have intracranial  intraparenchymal hemorrhage    SYSTEM BASED PLAN:    Neurologic:       Sedation-none       Analgesia-none    cw Neuro check Q4 now  neuro and neuro surgery follow up  cw keppra and no need of hypertonic saline as per NS  need MRI,MRA and MRV as per neurosurgery    Cardiovascular:       Vasopressors - none  on Nicardipine drip; try to wean down  switch to Labetalol and amlodipine PO  target blood pressure less than 140 systolic  labetalol dose increased today    Pulmonary:       Ventilator/NIV settings - none    Gastrointestinal:       Nutrition- mechanical soft diet as per speech and swallow    Genitourinary/Renal:       Goal fluid balance- even       Electrolytes-replete if needed    Infectious Disease:       Antimicrobials- none    Hematology/Oncology:  sub Q heprin as per NS  Endocrine:       Insulin regimen - if needed    Musculoskeletal:       Activity-as tolerated       Physical Therapy-PT /rehab consulted  Skin/decub ulcers- none    Indwelling vascular catheters:  femoral line  and arterial line  d/c once off Nicardipine drip    Urinary Catheter: ellis Gonzales      Disposition:  possible down grade later to stroke unit ,once off Nicardipine drip    Code Status: FULL CODE      -------------------------------------------------------------------------------------------    Current workup in progress:      follow MRI, MRA, MRV brain   keppra for 2 more weeks and no A/c for 2 more weeks as per NS  on sub q heprin as perNS  give trial of vois in am  candidate for in pt rehab    SIGN OUT AT 04-25-18 @ 14:11 GIVEN TO: DR ADRIANA PALACIO ATV [id number]

## 2018-04-25 NOTE — CONSULT NOTE ADULT - ASSESSMENT
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    PRECAUTIONS: [  ] Cardiac  [  ] Respiratory  [  ] Seizures [  ] Contact Isolation  [  ] Droplet Isolation  [  ] Other    Weight Bearing Status:     RECOMMENDATION:    Out of Bed to Chair     DVT/Decubiti Prophylaxis    REHAB PLAN:     [ x  ] Bedside P/T 3-5 times a week   [ x  ]   Bedside O/T  2-3 times a week             [   ] No Rehab Therapy Indicated                   [ x  ]  Speech Therapy   Conditioning/ROM                                    ADL  Bed Mobility                                               Conditioning/ROM  Transfers                                                     Bed Mobility  Sitting /Standing Balance                         Transfers                                        Gait Training                                               Sitting/Standing Balance  Stair Training [   ]Applicable                    Home equipment Eval                                                                        Splinting  [   ] Only      GOALS:   ADL   [   ]   Independent                    Transfers  [ x  ] Independent                          Ambulation  [ x  ] Independent     [ x   ] With device                            [ x  ]  CG                                                         [   ]  CG                                                                  [   ] CG                            [    ] Min A                                                   [   ] Min A                                                              [   ] Min  A          DISCHARGE PLAN:   [   ]  Good candidate for Intensive Rehabilitation/Hospital based-[facility]A [facility]                                             Will tolerate 3hrs Intensive Rehab Daily                                       [    ]  Short Term Rehab in Skilled Nursing Facility                                       [    ]  Home with Outpatient or  services                                         [  x  ]  Possible Candidate for Intensive Hospital based Rehab

## 2018-04-25 NOTE — CONSULT NOTE ADULT - SUBJECTIVE AND OBJECTIVE BOX
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  patient states that her legs gave out from underneath her and she was unable to get up due to weakness. Was on the floor for approximately 1 hour before her  found her and tried to help her up. came to the ED 3 hours after initial fall. In the ED patient found to be hypertensive /95, NIHSS 7, stroke code called and patient found to have left parietal intraparenchymal bleed.   intially during interview patient denied any symptoms including headache but immediately upon leaving room patient states that she does have a frontal headache.    in december 2017 patient had a mechanical fall, tripped over roadside curb getting out of the car and had inability to ambulate. required assistance to get indoors. went to see PMD, had a swollen ankle, not acute fracture noted from event, weakness resolved spontaneously. (23 Apr 2018 23:10)      PAST MEDICAL & SURGICAL HISTORY:  HLD (hyperlipidemia)  No significant past surgical history      Hospital Course:    TODAY'S SUBJECTIVE & REVIEW OF SYMPTOMS:     Constitutional WNL   Cardio WNL   Resp WNL   GI WNL  Heme WNL  Endo WNL  Skin WNL  MSK WNL  Neuro right side weakness  Cognitive WNL  Psych WNL      MEDICATIONS  (STANDING):  amLODIPine   Tablet 10 milliGRAM(s) Oral every 24 hours  atorvastatin 40 milliGRAM(s) Oral at bedtime  heparin  Injectable 5000 Unit(s) SubCutaneous every 8 hours  labetalol 200 milliGRAM(s) Oral every 8 hours  levETIRAcetam  IVPB 500 milliGRAM(s) IV Intermittent every 12 hours    MEDICATIONS  (PRN):  acetaminophen   Tablet. 650 milliGRAM(s) Oral every 6 hours PRN Mild Pain (1 - 3)      FAMILY HISTORY:  No pertinent family history in first degree relatives      Allergies    Celebrex (Other (Moderate))  sulfonamides (Unknown)    Intolerances        SOCIAL HISTORY:    [  ] Etoh  [  ] Smoking  [  ] Substance abuse     Home Environment:  [  ] Home Alone  [ x ] Lives with Family  [  ] Home Health Aid    Dwelling:  [  ] Apartment  [x  ] Private House  [  ] Adult Home  [  ] Skilled Nursing Facility      [  ] Short Term  [  ] Long Term  [x  ] Stairs       Elevator [  ]    FUNCTIONAL STATUS PTA: (Check all that apply)  Ambulation: [ x  ]Independent    [  ] Dependent     [  ] Non-Ambulatory  Assistive Device: [  ] SA Cane  [  ]  Q Cane  [  ] Walker  [  ]  Wheelchair  ADL : [ x ] Independent  [  ]  Dependent       Vital Signs Last 24 Hrs  T(C): 36.8 (25 Apr 2018 08:00), Max: 37.1 (25 Apr 2018 00:00)  T(F): 98.3 (25 Apr 2018 08:00), Max: 98.7 (25 Apr 2018 00:00)  HR: 82 (25 Apr 2018 11:30) (68 - 94)  BP: --  BP(mean): --  RR: 56 (25 Apr 2018 11:30) (14 - 74)  SpO2: 92% (25 Apr 2018 11:30) (85% - 97%)      PHYSICAL EXAM: Alert & Oriented X3  GENERAL: NAD, well-groomed, well-developed  HEAD:  Atraumatic, Normocephalic  CHEST/LUNG: Clear   HEART: Regular   ABDOMEN: Soft, Nontender  EXTREMITIES:  no calf tenderness    NERVOUS SYSTEM:  Cranial Nerves 2-12 intact [  ] Abnormal  [  ]  ROM: WFL all extremities [  ]  Abnormal [xx  ]limited right side  Motor Strength: WFL all extremities  [  ]  Abnormal [x  ]limited right side  Sensation: intact to light touch [  ] Abnormal [  ]  Reflexes: Symmetric [  ]  Abnormal [  ]    FUNCTIONAL STATUS:  Bed Mobility: Independent [  ]  Supervision [  ]  Needs Assistance [x  ]  N/A [  ]  Transfers: Independent [  ]  Supervision [  ]  Needs Assistance [x  ]  N/A [  ]   Ambulation: Independent [  ]  Supervision [  ]  Needs Assistance [  ]  N/A [  ]  ADL: Independent [  ] Requires Assistance [  ] N/A [  ]      LABS:                        11.4   9.13  )-----------( 297      ( 25 Apr 2018 05:23 )             34.4     04-25    146  |  108  |  9<L>  ----------------------------<  120<H>  3.7   |  26  |  0.6<L>    Ca    8.7      25 Apr 2018 05:23  Mg     1.7     04-25    TPro  6.0  /  Alb  3.7  /  TBili  0.5  /  DBili  x   /  AST  14  /  ALT  13  /  AlkPhos  66  04-25    PT/INR - ( 24 Apr 2018 05:05 )   PT: 11.80 sec;   INR: 1.09 ratio         PTT - ( 24 Apr 2018 05:05 )  PTT:25.2 sec      RADIOLOGY & ADDITIONAL STUDIES:    Assesment:

## 2018-04-25 NOTE — PROGRESS NOTE ADULT - ASSESSMENT
This patient has a stable intracranial bleed.  Her hypertension is improving.  I spoke to Dr. FRANKIE AVILA at approximately 11 AM.  The plan is to wean the patient off the nicardipine control of blood pressure on oral medications in transfer her to the [facility] in the neuro unit.

## 2018-04-25 NOTE — PROGRESS NOTE ADULT - SUBJECTIVE AND OBJECTIVE BOX
Subjective: feeling better    T(C): 36.8 (04-25-18 @ 08:00), Max: 37.1 (04-25-18 @ 00:00)  HR: 78 (04-25-18 @ 09:15) (68 - 94)  BP: --  RR: 28 (04-25-18 @ 09:15) (14 - 58)  SpO2: 91% (04-25-18 @ 09:15) (85% - 97%)  Wt(kg): --    Exam: awake,alert, NAD, good spirits, patient seen with Dr Young    CBC Full  -  ( 25 Apr 2018 05:23 )  WBC Count : 9.13 K/uL  Hemoglobin : 11.4 g/dL  Hematocrit : 34.4 %  Platelet Count - Automated : 297 K/uL  Mean Cell Volume : 82.7 fL  Mean Cell Hemoglobin : 27.4 pg  Mean Cell Hemoglobin Concentration : 33.1 g/dL  Auto Neutrophil # : 3.41 K/uL  Auto Lymphocyte # : 0.83 K/uL  Auto Monocyte # : 0.39 K/uL  Auto Eosinophil # : 4.43 K/uL  Auto Basophil # : 0.03 K/uL  Auto Neutrophil % : 37.4 %  Auto Lymphocyte % : 9.1 %  Auto Monocyte % : 4.3 %  Auto Eosinophil % : 48.5 %  Auto Basophil % : 0.3 %    04-25    146  |  108  |  9<L>  ----------------------------<  120<H>  3.7   |  26  |  0.6<L>    Ca    8.7      25 Apr 2018 05:23  Mg     1.7     04-25    TPro  6.0  /  Alb  3.7  /  TBili  0.5  /  DBili  x   /  AST  14  /  ALT  13  /  AlkPhos  66  04-25    PT/INR - ( 24 Apr 2018 05:05 )   PT: 11.80 sec;   INR: 1.09 ratio         PTT - ( 24 Apr 2018 05:05 )  PTT:25.2 sec    Assessment/Plan: family at bedside, case discussed no surgical intervention, , can down grade to floor, neuro checks q4, MRI, MRA, MRV pending

## 2018-04-25 NOTE — PROGRESS NOTE ADULT - SUBJECTIVE AND OBJECTIVE BOX
Over Night Events:  Patient seen and examined.       ROS:  See HPI    PHYSICAL EXAM    ICU Vital Signs Last 24 Hrs  T(C): 36.8 (25 Apr 2018 08:00), Max: 37.1 (25 Apr 2018 00:00)  T(F): 98.3 (25 Apr 2018 08:00), Max: 98.7 (25 Apr 2018 00:00)  HR: 82 (25 Apr 2018 11:30) (68 - 94)  BP: --  BP(mean): --  ABP: 144/42 (25 Apr 2018 11:30) (114/38 - 154/54)  ABP(mean): 76 (25 Apr 2018 11:30) (64 - 98)  RR: 56 (25 Apr 2018 11:30) (14 - 74)  SpO2: 92% (25 Apr 2018 11:30) (85% - 97%)    This patient was examined at approximately 0720 hrs.  Her pulse was 81 blood pressure 141/85.  Her family was in the room.  There was no real change in her examination.  Still only moves the left's right side minimally to significant pain.  She is calm.  She is on nicardipine.  Her chest is clear.  His abdomen is soft.  There is no clubbing cyanosis or edema.I&O's Detail    24 Apr 2018 07:01  -  25 Apr 2018 07:00  --------------------------------------------------------  IN:    IV PiggyBack: 100 mL    niCARdipine Infusion: 500 mL    sodium chloride 3%: 400 mL    sodium chloride 3%: 60 mL  Total IN: 1060 mL    OUT:    Indwelling Catheter - Urethral: 970 mL    Voided: 700 mL  Total OUT: 1670 mL    Total NET: -610 mL      25 Apr 2018 07:01  -  25 Apr 2018 14:27  --------------------------------------------------------  IN:    niCARdipine Infusion: 162.5 mL  Total IN: 162.5 mL    OUT:    Indwelling Catheter - Urethral: 260 mL  Total OUT: 260 mL    Total NET: -97.5 mL          LABS:                          11.4   9.13  )-----------( 297      ( 25 Apr 2018 05:23 )             34.4         25 Apr 2018 05:23    146    |  108    |  9      ----------------------------<  120    3.7     |  26     |  0.6      Ca    8.7        25 Apr 2018 05:23  Mg     1.7       25 Apr 2018 05:23    TPro  6.0    /  Alb  3.7    /  TBili  0.5    /  DBili  x      /  AST  14     /  ALT  13     /  AlkPhos  66     25 Apr 2018 05:23  Amylase x     lipase x                                                 PT/INR - ( 24 Apr 2018 05:05 )   PT: 11.80 sec;   INR: 1.09 ratio         PTT - ( 24 Apr 2018 05:05 )  PTT:25.2 sec                                             CARDIAC MARKERS ( 24 Apr 2018 05:05 )  x     / <0.01 ng/mL / 93 U/L / x     / 4.2 ng/mL  CARDIAC MARKERS ( 23 Apr 2018 18:59 )  x     / <0.01 ng/mL / 89 U/L / x     / x                                                                                                                                                 MEDICATIONS  (STANDING):  amLODIPine   Tablet 10 milliGRAM(s) Oral every 24 hours  atorvastatin 40 milliGRAM(s) Oral at bedtime  heparin  Injectable 5000 Unit(s) SubCutaneous every 8 hours  labetalol 200 milliGRAM(s) Oral every 8 hours  levETIRAcetam  IVPB 500 milliGRAM(s) IV Intermittent every 12 hours    MEDICATIONS  (PRN):  acetaminophen   Tablet. 650 milliGRAM(s) Oral every 6 hours PRN Mild Pain (1 - 3)          Xrays:  TLC:  OG:  ET tube:                                                                                       ECHO:    IMPRESSION:      PLAN:    CNS:    HEENT:    PULMONARY:    CARDIOVASCULAR:    GI: GI prophylaxis.  Feeding     RENAL:    INFECTIOUS DISEASE:    HEMATOLOGICAL:  DVT prophylaxis.    ENDOCRINE:  Follow up FS.  Insulin protocol if needed.    MUSCULOSKELETAL:

## 2018-04-26 LAB
ALBUMIN SERPL ELPH-MCNC: 3.6 G/DL — SIGNIFICANT CHANGE UP (ref 3.5–5.2)
ALP SERPL-CCNC: 72 U/L — SIGNIFICANT CHANGE UP (ref 30–115)
ALT FLD-CCNC: 13 U/L — SIGNIFICANT CHANGE UP (ref 0–41)
ANION GAP SERPL CALC-SCNC: 13 MMOL/L — SIGNIFICANT CHANGE UP (ref 7–14)
AST SERPL-CCNC: 14 U/L — SIGNIFICANT CHANGE UP (ref 0–41)
BASOPHILS # BLD AUTO: 0.03 K/UL — SIGNIFICANT CHANGE UP (ref 0–0.2)
BASOPHILS NFR BLD AUTO: 0.3 % — SIGNIFICANT CHANGE UP (ref 0–1)
BILIRUB SERPL-MCNC: 0.6 MG/DL — SIGNIFICANT CHANGE UP (ref 0.2–1.2)
BUN SERPL-MCNC: 13 MG/DL — SIGNIFICANT CHANGE UP (ref 10–20)
CALCIUM SERPL-MCNC: 8.7 MG/DL — SIGNIFICANT CHANGE UP (ref 8.5–10.1)
CHLORIDE SERPL-SCNC: 104 MMOL/L — SIGNIFICANT CHANGE UP (ref 98–110)
CO2 SERPL-SCNC: 27 MMOL/L — SIGNIFICANT CHANGE UP (ref 17–32)
CREAT SERPL-MCNC: 0.6 MG/DL — LOW (ref 0.7–1.5)
EOSINOPHIL # BLD AUTO: 3.28 K/UL — HIGH (ref 0–0.7)
EOSINOPHIL NFR BLD AUTO: 33.4 % — HIGH (ref 0–8)
GLUCOSE SERPL-MCNC: 102 MG/DL — HIGH (ref 70–99)
HCT VFR BLD CALC: 35.6 % — LOW (ref 37–47)
HGB BLD-MCNC: 11.7 G/DL — LOW (ref 12–16)
IMM GRANULOCYTES NFR BLD AUTO: 0.5 % — HIGH (ref 0.1–0.3)
LYMPHOCYTES # BLD AUTO: 0.73 K/UL — LOW (ref 1.2–3.4)
LYMPHOCYTES # BLD AUTO: 7.4 % — LOW (ref 20.5–51.1)
MAGNESIUM SERPL-MCNC: 1.8 MG/DL — SIGNIFICANT CHANGE UP (ref 1.8–2.4)
MCHC RBC-ENTMCNC: 27.1 PG — SIGNIFICANT CHANGE UP (ref 27–31)
MCHC RBC-ENTMCNC: 32.9 G/DL — SIGNIFICANT CHANGE UP (ref 32–37)
MCV RBC AUTO: 82.4 FL — SIGNIFICANT CHANGE UP (ref 81–99)
MONOCYTES # BLD AUTO: 0.41 K/UL — SIGNIFICANT CHANGE UP (ref 0.1–0.6)
MONOCYTES NFR BLD AUTO: 4.2 % — SIGNIFICANT CHANGE UP (ref 1.7–9.3)
NEUTROPHILS # BLD AUTO: 5.33 K/UL — SIGNIFICANT CHANGE UP (ref 1.4–6.5)
NEUTROPHILS NFR BLD AUTO: 54.2 % — SIGNIFICANT CHANGE UP (ref 42.2–75.2)
PLATELET # BLD AUTO: 287 K/UL — SIGNIFICANT CHANGE UP (ref 130–400)
POTASSIUM SERPL-MCNC: 3.7 MMOL/L — SIGNIFICANT CHANGE UP (ref 3.5–5)
POTASSIUM SERPL-SCNC: 3.7 MMOL/L — SIGNIFICANT CHANGE UP (ref 3.5–5)
PROT SERPL-MCNC: 6 G/DL — SIGNIFICANT CHANGE UP (ref 6–8)
RBC # BLD: 4.32 M/UL — SIGNIFICANT CHANGE UP (ref 4.2–5.4)
RBC # FLD: 13.6 % — SIGNIFICANT CHANGE UP (ref 11.5–14.5)
SODIUM SERPL-SCNC: 144 MMOL/L — SIGNIFICANT CHANGE UP (ref 135–146)
WBC # BLD: 9.83 K/UL — SIGNIFICANT CHANGE UP (ref 4.8–10.8)
WBC # FLD AUTO: 9.83 K/UL — SIGNIFICANT CHANGE UP (ref 4.8–10.8)

## 2018-04-26 RX ADMIN — Medication 200 MILLIGRAM(S): at 05:27

## 2018-04-26 RX ADMIN — HEPARIN SODIUM 5000 UNIT(S): 5000 INJECTION INTRAVENOUS; SUBCUTANEOUS at 21:23

## 2018-04-26 RX ADMIN — Medication 200 MILLIGRAM(S): at 21:23

## 2018-04-26 RX ADMIN — AMLODIPINE BESYLATE 10 MILLIGRAM(S): 2.5 TABLET ORAL at 08:08

## 2018-04-26 RX ADMIN — ATORVASTATIN CALCIUM 40 MILLIGRAM(S): 80 TABLET, FILM COATED ORAL at 21:23

## 2018-04-26 RX ADMIN — LEVETIRACETAM 420 MILLIGRAM(S): 250 TABLET, FILM COATED ORAL at 17:50

## 2018-04-26 RX ADMIN — Medication 200 MILLIGRAM(S): at 13:33

## 2018-04-26 RX ADMIN — HEPARIN SODIUM 5000 UNIT(S): 5000 INJECTION INTRAVENOUS; SUBCUTANEOUS at 13:33

## 2018-04-26 RX ADMIN — LEVETIRACETAM 420 MILLIGRAM(S): 250 TABLET, FILM COATED ORAL at 05:26

## 2018-04-26 RX ADMIN — HEPARIN SODIUM 5000 UNIT(S): 5000 INJECTION INTRAVENOUS; SUBCUTANEOUS at 05:26

## 2018-04-26 NOTE — OCCUPATIONAL THERAPY INITIAL EVALUATION ADULT - IMPAIRED TRANSFERS: BED/CHAIR, REHAB EVAL
impaired postural control/decreased ROM/decreased strength/impaired coordination/impaired motor control/impaired balance

## 2018-04-26 NOTE — PHYSICAL THERAPY INITIAL EVALUATION ADULT - GENERAL OBSERVATIONS, REHAB EVAL
14:00-14:45 Pt encountered seated in b/s chair in NAD. Pt appears very restless, eyes closed. 14:15-14:45 Pt encountered seated in b/s chair in NAD. Pt appears very restless, eyes closed.

## 2018-04-26 NOTE — PHYSICAL THERAPY INITIAL EVALUATION ADULT - LEVEL OF INDEPENDENCE: SIT/STAND, REHAB EVAL
Pt able to achieve 1/2 lift off chair B knees blocked to prevent buckling. Pt declined further attempt at standing./dependent (less than 25% patients effort)

## 2018-04-26 NOTE — PHYSICAL THERAPY INITIAL EVALUATION ADULT - GROSSLY INTACT, SENSORY
Right LE/pt reports absent - but has difficulty with following directions and is restless so will need to assess further/Right UE

## 2018-04-26 NOTE — PROGRESS NOTE ADULT - ASSESSMENT
81 yo female p/w fall and right sideded weakness, found to have intracranial  intraparenchymal hemorrhage      #Intraparenchymal hemorrhage  - Neurosx rec is to continue keppra for 2 weeks BID, and to hold anticoag for 2 weeks  - Neuro Recommending Keppra continuation, monitor bp, keep between 130-140    #HTN  -c/w bp meds  -monitor bp      Diet: mechanical soft diet as per speech and swallow    DVT: sub Q heprin  Activity-as tolerated  PT /rehab  Full Code 81 yo female p/w fall and right sideded weakness, found to have intracranial  intraparenchymal hemorrhage    #Intraparenchymal hemorrhage  - Neurosx rec is to continue keppra for 2 weeks BID, and to hold anticoag for 2 weeks  - Neuro Recommending Keppra continuation, monitor bp, keep between 130-140    #HTN  -c/w bp meds, adjust with SBP <140  -monitor bp q4h x24hrs    Diet: mechanical soft diet as per speech and swallow    DVT: sub Q heprin  Activity-as tolerated  PT /rehab  Full Code

## 2018-04-26 NOTE — PROGRESS NOTE ADULT - ATTENDING COMMENTS
Patient seen and examined independently. I agree with the resident's note, physical exam, and plan except as below. Patient seen and examined independently. I agree with the resident's note, physical exam, and plan except as below. On exam: + expressive aphasia and Rt side 0/5. Aggressive BP control. OOB, Rehab. Patient seen and examined independently. I agree with the resident's note, physical exam, and plan except as below. On exam: + expressive aphasia and Rt side 0/5. Aggressive BP control. OOB, Rehab. MRI brain with and without contrast as per neuro.

## 2018-04-26 NOTE — PHYSICAL THERAPY INITIAL EVALUATION ADULT - IMPAIRED TRANSFERS: SIT/STAND, REHAB EVAL
impaired sensory feedback/decreased strength/impaired postural control/impaired balance/decreased ROM

## 2018-04-26 NOTE — PHYSICAL THERAPY INITIAL EVALUATION ADULT - LIVES WITH, PROFILE
Pt lives with spouse who has Alzheimer's. But since January stays with daughter in pvt home, has steps./children

## 2018-04-26 NOTE — PROGRESS NOTE ADULT - SUBJECTIVE AND OBJECTIVE BOX
LENGTH OF HOSPITAL STAY: 3d    CHIEF COMPLAINT:   Patient is a 82y old  Female who presents with a chief complaint of intracranial parenchymal hemorrhage (23 Apr 2018 23:10)      Overnight events:    No acute events overnight    ALLERGIES:  Celebrex (Other (Moderate))  sulfonamides (Unknown)    MEDICATIONS:  STANDING MEDICATIONS  amLODIPine   Tablet 10 milliGRAM(s) Oral every 24 hours  atorvastatin 40 milliGRAM(s) Oral at bedtime  heparin  Injectable 5000 Unit(s) SubCutaneous every 8 hours  labetalol 200 milliGRAM(s) Oral every 8 hours  levETIRAcetam  IVPB 500 milliGRAM(s) IV Intermittent every 12 hours    PRN MEDICATIONS  acetaminophen   Tablet. 650 milliGRAM(s) Oral every 6 hours PRN    VITALS:   T(F): 96.6  HR: 79  BP: 141/66  RR: 52  SpO2: 91%    LABS:                        11.7   9.83  )-----------( 287      ( 26 Apr 2018 09:42 )             35.6     04-26    144  |  104  |  13  ----------------------------<  102<H>  3.7   |  27  |  0.6<L>    Ca    8.7      26 Apr 2018 09:42  Mg     1.8     04-26    TPro  6.0  /  Alb  3.6  /  TBili  0.6  /  DBili  x   /  AST  14  /  ALT  13  /  AlkPhos  72  04-26    PHYSICAL EXAM:  GEN: No acute distress  LUNGS: Clear to auscultation bilaterally   HEART: S1/S2 present. RRR.   ABD: Soft, non-tender, non-distended. Bowel sounds present LENGTH OF HOSPITAL STAY: 3d    CHIEF COMPLAINT:   Patient is a 82y old  Female who presents with a chief complaint of intracranial parenchymal hemorrhage (23 Apr 2018 23:10)      Overnight events:    No acute events overnight    ALLERGIES:  Celebrex (Other (Moderate))  sulfonamides (Unknown)    MEDICATIONS:  STANDING MEDICATIONS  amLODIPine   Tablet 10 milliGRAM(s) Oral every 24 hours  atorvastatin 40 milliGRAM(s) Oral at bedtime  heparin  Injectable 5000 Unit(s) SubCutaneous every 8 hours  labetalol 200 milliGRAM(s) Oral every 8 hours  levETIRAcetam  IVPB 500 milliGRAM(s) IV Intermittent every 12 hours    PRN MEDICATIONS  acetaminophen   Tablet. 650 milliGRAM(s) Oral every 6 hours PRN    VITALS:   T(F): 96.6  HR: 79  BP: 141/66  RR: 52  SpO2: 91%    LABS:                        11.7   9.83  )-----------( 287      ( 26 Apr 2018 09:42 )             35.6     04-26    144  |  104  |  13  ----------------------------<  102<H>  3.7   |  27  |  0.6<L>    Ca    8.7      26 Apr 2018 09:42  Mg     1.8     04-26    TPro  6.0  /  Alb  3.6  /  TBili  0.6  /  DBili  x   /  AST  14  /  ALT  13  /  AlkPhos  72  04-26    PHYSICAL EXAM:  GEN: No acute distress, +expressive aphasia  Resp: Clear to auscultation bilaterally   CV: S1/S2 present. RRR.   GI: Soft, non-tender, non-distended. Bowel sounds present  MS: no c/c/e  Neuro: 0/5 on Rt

## 2018-04-27 LAB
ANION GAP SERPL CALC-SCNC: 12 MMOL/L — SIGNIFICANT CHANGE UP (ref 7–14)
BASOPHILS # BLD AUTO: 0.03 K/UL — SIGNIFICANT CHANGE UP (ref 0–0.2)
BASOPHILS NFR BLD AUTO: 0.4 % — SIGNIFICANT CHANGE UP (ref 0–1)
BUN SERPL-MCNC: 19 MG/DL — SIGNIFICANT CHANGE UP (ref 10–20)
CALCIUM SERPL-MCNC: 8.6 MG/DL — SIGNIFICANT CHANGE UP (ref 8.5–10.1)
CHLORIDE SERPL-SCNC: 104 MMOL/L — SIGNIFICANT CHANGE UP (ref 98–110)
CO2 SERPL-SCNC: 28 MMOL/L — SIGNIFICANT CHANGE UP (ref 17–32)
CREAT SERPL-MCNC: 0.6 MG/DL — LOW (ref 0.7–1.5)
EOSINOPHIL # BLD AUTO: 2.18 K/UL — HIGH (ref 0–0.7)
EOSINOPHIL NFR BLD AUTO: 27.8 % — HIGH (ref 0–8)
GLUCOSE SERPL-MCNC: 97 MG/DL — SIGNIFICANT CHANGE UP (ref 70–99)
HCT VFR BLD CALC: 34.7 % — LOW (ref 37–47)
HGB BLD-MCNC: 11.5 G/DL — LOW (ref 12–16)
IMM GRANULOCYTES NFR BLD AUTO: 0.4 % — HIGH (ref 0.1–0.3)
LYMPHOCYTES # BLD AUTO: 0.77 K/UL — LOW (ref 1.2–3.4)
LYMPHOCYTES # BLD AUTO: 9.8 % — LOW (ref 20.5–51.1)
MAGNESIUM SERPL-MCNC: 1.8 MG/DL — SIGNIFICANT CHANGE UP (ref 1.8–2.4)
MCHC RBC-ENTMCNC: 27.4 PG — SIGNIFICANT CHANGE UP (ref 27–31)
MCHC RBC-ENTMCNC: 33.1 G/DL — SIGNIFICANT CHANGE UP (ref 32–37)
MCV RBC AUTO: 82.6 FL — SIGNIFICANT CHANGE UP (ref 81–99)
MONOCYTES # BLD AUTO: 0.36 K/UL — SIGNIFICANT CHANGE UP (ref 0.1–0.6)
MONOCYTES NFR BLD AUTO: 4.6 % — SIGNIFICANT CHANGE UP (ref 1.7–9.3)
NEUTROPHILS # BLD AUTO: 4.47 K/UL — SIGNIFICANT CHANGE UP (ref 1.4–6.5)
NEUTROPHILS NFR BLD AUTO: 57 % — SIGNIFICANT CHANGE UP (ref 42.2–75.2)
NRBC # BLD: 0 /100 WBCS — SIGNIFICANT CHANGE UP (ref 0–0)
PHOSPHATE SERPL-MCNC: 4.1 MG/DL — SIGNIFICANT CHANGE UP (ref 2.1–4.9)
PLATELET # BLD AUTO: 249 K/UL — SIGNIFICANT CHANGE UP (ref 130–400)
POTASSIUM SERPL-MCNC: 3.5 MMOL/L — SIGNIFICANT CHANGE UP (ref 3.5–5)
POTASSIUM SERPL-SCNC: 3.5 MMOL/L — SIGNIFICANT CHANGE UP (ref 3.5–5)
RBC # BLD: 4.2 M/UL — SIGNIFICANT CHANGE UP (ref 4.2–5.4)
RBC # FLD: 13.7 % — SIGNIFICANT CHANGE UP (ref 11.5–14.5)
SODIUM SERPL-SCNC: 144 MMOL/L — SIGNIFICANT CHANGE UP (ref 135–146)
WBC # BLD: 7.84 K/UL — SIGNIFICANT CHANGE UP (ref 4.8–10.8)
WBC # FLD AUTO: 7.84 K/UL — SIGNIFICANT CHANGE UP (ref 4.8–10.8)

## 2018-04-27 RX ORDER — NIFEDIPINE 30 MG
30 TABLET, EXTENDED RELEASE 24 HR ORAL ONCE
Qty: 0 | Refills: 0 | Status: COMPLETED | OUTPATIENT
Start: 2018-04-27 | End: 2018-04-27

## 2018-04-27 RX ORDER — NIFEDIPINE 30 MG
60 TABLET, EXTENDED RELEASE 24 HR ORAL DAILY
Qty: 0 | Refills: 0 | Status: DISCONTINUED | OUTPATIENT
Start: 2018-04-28 | End: 2018-04-30

## 2018-04-27 RX ORDER — POLYETHYLENE GLYCOL 3350 17 G/17G
17 POWDER, FOR SOLUTION ORAL EVERY 12 HOURS
Qty: 0 | Refills: 0 | Status: DISCONTINUED | OUTPATIENT
Start: 2018-04-27 | End: 2018-04-30

## 2018-04-27 RX ORDER — DOCUSATE SODIUM 100 MG
100 CAPSULE ORAL DAILY
Qty: 0 | Refills: 0 | Status: DISCONTINUED | OUTPATIENT
Start: 2018-04-27 | End: 2018-04-30

## 2018-04-27 RX ORDER — SENNA PLUS 8.6 MG/1
2 TABLET ORAL AT BEDTIME
Qty: 0 | Refills: 0 | Status: DISCONTINUED | OUTPATIENT
Start: 2018-04-27 | End: 2018-04-30

## 2018-04-27 RX ADMIN — Medication 200 MILLIGRAM(S): at 05:46

## 2018-04-27 RX ADMIN — Medication 30 MILLIGRAM(S): at 10:22

## 2018-04-27 RX ADMIN — AMLODIPINE BESYLATE 10 MILLIGRAM(S): 2.5 TABLET ORAL at 08:59

## 2018-04-27 RX ADMIN — Medication 100 MILLIGRAM(S): at 11:44

## 2018-04-27 RX ADMIN — HEPARIN SODIUM 5000 UNIT(S): 5000 INJECTION INTRAVENOUS; SUBCUTANEOUS at 14:50

## 2018-04-27 RX ADMIN — HEPARIN SODIUM 5000 UNIT(S): 5000 INJECTION INTRAVENOUS; SUBCUTANEOUS at 05:46

## 2018-04-27 RX ADMIN — LEVETIRACETAM 420 MILLIGRAM(S): 250 TABLET, FILM COATED ORAL at 17:58

## 2018-04-27 RX ADMIN — Medication 200 MILLIGRAM(S): at 22:10

## 2018-04-27 RX ADMIN — ATORVASTATIN CALCIUM 40 MILLIGRAM(S): 80 TABLET, FILM COATED ORAL at 22:10

## 2018-04-27 RX ADMIN — LEVETIRACETAM 420 MILLIGRAM(S): 250 TABLET, FILM COATED ORAL at 05:47

## 2018-04-27 RX ADMIN — SENNA PLUS 2 TABLET(S): 8.6 TABLET ORAL at 22:10

## 2018-04-27 RX ADMIN — Medication 200 MILLIGRAM(S): at 14:50

## 2018-04-27 RX ADMIN — POLYETHYLENE GLYCOL 3350 17 GRAM(S): 17 POWDER, FOR SOLUTION ORAL at 17:59

## 2018-04-27 RX ADMIN — HEPARIN SODIUM 5000 UNIT(S): 5000 INJECTION INTRAVENOUS; SUBCUTANEOUS at 22:12

## 2018-04-27 NOTE — SWALLOW BEDSIDE ASSESSMENT ADULT - SLP PERTINENT HISTORY OF CURRENT PROBLEM
Pt w/ IPH, PMH: HLD. CTH: : medial posterior parietal IPH w/ surrounding edema w/o mass effect.
Pt w/ IPH, PMH: HLD. CTH: : medial posterior parietal IPH w/ surrounding edema w/o mass effect.

## 2018-04-27 NOTE — PROGRESS NOTE ADULT - ATTENDING COMMENTS
Patient seen and examined independently. I agree with the resident's note, physical exam, and plan except as below. On exam: decreased expressive aphasia and Rt side 0/5. Aggressive BP control. Will D/c Norvasc and Start Procardia XR 60mg. OOB, Rehab. MRI brain with and without contrast as per neuro.

## 2018-04-27 NOTE — SWALLOW BEDSIDE ASSESSMENT ADULT - ASR SWALLOW ASPIRATION MONITOR
gurgly voice/position upright (90Y)/cough/fever/pneumonia
position upright (90Y)/cough/fever/pneumonia/gurgly voice

## 2018-04-27 NOTE — SWALLOW BEDSIDE ASSESSMENT ADULT - NS SPL SWALLOW CLINIC TRIAL FT
+overt s/s of penetration/aspiration w/ large consecutive sips of thin, +toleration of small controlled sips when self fed
+toleration

## 2018-04-27 NOTE — SWALLOW BEDSIDE ASSESSMENT ADULT - SLP GENERAL OBSERVATIONS
Pt received in bed asleep, +woke to verbal stim, +restless
Pt received in bed w/ daughter at b/s awake and alert on room air, required cues to maintain arousal

## 2018-04-27 NOTE — PROGRESS NOTE ADULT - SUBJECTIVE AND OBJECTIVE BOX
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  patient states that her legs gave out from underneath her and she was unable to get up due to weakness. Was on the floor for approximately 1 hour before her  found her and tried to help her up. came to the ED 3 hours after initial fall. In the ED patient found to be hypertensive /95, NIHSS 7, stroke code called and patient found to have an intraparenchymal bleed.   intially during interview patient denied any symptoms including headache but immediately upon leaving room patient states that she does have a frontal headache.    in december 2017 patient had a mechanical fall, tripped over roadside curb getting out of the car and had inability to ambulate. required assistance to get indoors. went to see PMD, had a swollen ankle, not acute fracture noted from event, weakness resolved spontaneously. (23 Apr 2018 23:10)      Interval History - no new events or complaints          Vital Signs Last 24 Hrs  T(C): 36.3 (27 Apr 2018 07:40), Max: 37 (26 Apr 2018 23:13)  T(F): 97.3 (27 Apr 2018 07:40), Max: 98.6 (26 Apr 2018 23:13)  HR: 65 (27 Apr 2018 07:40) (65 - 81)  BP: 156/67 (27 Apr 2018 10:23) (145/66 - 183/78)  BP(mean): --  RR: 18 (27 Apr 2018 07:40) (18 - 18)  SpO2: 97% (26 Apr 2018 15:34) (97% - 97%)  ICU Vital Signs Last 24 Hrs  T(C): 36.3 (27 Apr 2018 07:40), Max: 37 (26 Apr 2018 23:13)  T(F): 97.3 (27 Apr 2018 07:40), Max: 98.6 (26 Apr 2018 23:13)  HR: 65 (27 Apr 2018 07:40) (65 - 81)  BP: 156/67 (27 Apr 2018 10:23) (145/66 - 183/78)  BP(mean): --  ABP: --  ABP(mean): --  RR: 18 (27 Apr 2018 07:40) (18 - 18)  SpO2: 97% (26 Apr 2018 15:34) (97% - 97%)          Neurological Exam:   right hemiparesis unchanged    Medications  acetaminophen   Tablet. 650 milliGRAM(s) Oral every 6 hours PRN  atorvastatin 40 milliGRAM(s) Oral at bedtime  docusate sodium 100 milliGRAM(s) Oral daily  heparin  Injectable 5000 Unit(s) SubCutaneous every 8 hours  labetalol 200 milliGRAM(s) Oral every 8 hours  levETIRAcetam  IVPB 500 milliGRAM(s) IV Intermittent every 12 hours  NIFEdipine XL 30 milliGRAM(s) Oral once  senna 2 Tablet(s) Oral at bedtime      Lab                        11.5   7.84  )-----------( 249      ( 27 Apr 2018 10:24 )             34.7     04-27    144  |  104  |  19  ----------------------------<  97  3.5   |  28  |  0.6<L>    Ca    8.6      27 Apr 2018 10:24  Phos  4.1     04-27  Mg     1.8     04-27    TPro  6.0  /  Alb  3.6  /  TBili  0.6  /  DBili  x   /  AST  14  /  ALT  13  /  AlkPhos  72  04-26    CAPILLARY BLOOD GLUCOSE        LIVER FUNCTIONS - ( 26 Apr 2018 09:42 )  Alb: 3.6 g/dL / Pro: 6.0 g/dL / ALK PHOS: 72 U/L / ALT: 13 U/L / AST: 14 U/L / GGT: x               Radiology  < from: CT Head No Cont (04.24.18 @ 16:02) >  INTERPRETATION:  Clinical History / Reason for exam: Intracranial   hemorrhage follow-up    Technique: Noncontrast head CT. Contiguous CT axial images of the head   from the base to the vertex.    Comparison: CT head 4/23/2018    Findings:    There is motion artifact.    There is no significant interval change in the size of a left parietal   lobar parenchymal hematoma measuring up to 4.5 cm in AP diameter   (previously 4.6 cm when measured at a similar level). Stable mild   surrounding edema and compression of the left lateral ventricle.    There are stable confluent hypodensities in the cerebral hemispheric   white matter consistent with chronic microvascular change.    There is no midline shift. There is calcific atherosclerotic disease at   the skull base.    The calvarium is intact. The visualized paranasal sinuses and mastoids   are well-aerated.    IMPRESSION:    No significant interval change in the left parietal lobar parenchymal   hematoma measuring up to 4.5 cm (AP) with mild surrounding edema and   partial compression of the left lateral ventricle.    < end of copied text >      Assessment-Patient with large ICH possibly related to hypertension    Plan  1. No anticoagulation for 2 weeks  2. Can repeat CTH in 1 week and if improving can start antiplatelets  3. Continue keppra total of 2 weeks  4. In 6-8 weeks will need MRI brain w/w/o ROBERT  5. PT/OT/Rehab  6. Call with questions or for any changes

## 2018-04-27 NOTE — PROGRESS NOTE ADULT - SUBJECTIVE AND OBJECTIVE BOX
LENGTH OF HOSPITAL STAY: 4d    CHIEF COMPLAINT:   Patient is a 82y old  Female who presents with a chief complaint of intracranial parenchymal hemorrhage (23 Apr 2018 23:10)      Overnight events:    No acute events overnight    ALLERGIES:  Celebrex (Other (Moderate))  sulfonamides (Unknown)    MEDICATIONS:  STANDING MEDICATIONS  amLODIPine   Tablet 10 milliGRAM(s) Oral every 24 hours  atorvastatin 40 milliGRAM(s) Oral at bedtime  heparin  Injectable 5000 Unit(s) SubCutaneous every 8 hours  labetalol 200 milliGRAM(s) Oral every 8 hours  levETIRAcetam  IVPB 500 milliGRAM(s) IV Intermittent every 12 hours    PRN MEDICATIONS  acetaminophen   Tablet. 650 milliGRAM(s) Oral every 6 hours PRN    VITALS:   T(F): 98.6  HR: 73  BP: 183/78  RR: 18  SpO2: 97%    LABS:                        11.7   9.83  )-----------( 287      ( 26 Apr 2018 09:42 )             35.6     04-26    144  |  104  |  13  ----------------------------<  102<H>  3.7   |  27  |  0.6<L>    Ca    8.7      26 Apr 2018 09:42  Mg     1.8     04-26    TPro  6.0  /  Alb  3.6  /  TBili  0.6  /  DBili  x   /  AST  14  /  ALT  13  /  AlkPhos  72  04-26      PHYSICAL EXAM:  GEN: No acute distress  LUNGS: Clear to auscultation bilaterally   HEART: S1/S2 present. RRR.   ABD: Soft, non-tender, non-distended. Bowel sounds present  NEURO: AAOX1 LENGTH OF HOSPITAL STAY: 4d    CHIEF COMPLAINT:   Patient is a 82y old  Female who presents with a chief complaint of intracranial parenchymal hemorrhage (23 Apr 2018 23:10)      Overnight events:    No acute events overnight    ALLERGIES:  Celebrex (Other (Moderate))  sulfonamides (Unknown)    MEDICATIONS:  STANDING MEDICATIONS  amLODIPine   Tablet 10 milliGRAM(s) Oral every 24 hours  atorvastatin 40 milliGRAM(s) Oral at bedtime  heparin  Injectable 5000 Unit(s) SubCutaneous every 8 hours  labetalol 200 milliGRAM(s) Oral every 8 hours  levETIRAcetam  IVPB 500 milliGRAM(s) IV Intermittent every 12 hours    PRN MEDICATIONS  acetaminophen   Tablet. 650 milliGRAM(s) Oral every 6 hours PRN    VITALS:   T(F): 98.6  HR: 73  BP: 183/78  RR: 18  SpO2: 97%    LABS:                        11.7   9.83  )-----------( 287      ( 26 Apr 2018 09:42 )             35.6     04-26    144  |  104  |  13  ----------------------------<  102<H>  3.7   |  27  |  0.6<L>    Ca    8.7      26 Apr 2018 09:42  Mg     1.8     04-26    TPro  6.0  /  Alb  3.6  /  TBili  0.6  /  DBili  x   /  AST  14  /  ALT  13  /  AlkPhos  72  04-26      PHYSICAL EXAM:  GEN: No acute distress  Resp: Clear to auscultation bilaterally   CV: S1/S2 present. RRR.   GI: Soft, non-tender, non-distended. Bowel sounds present  NEURO: AAOX2+, Rt 0/5

## 2018-04-27 NOTE — PROGRESS NOTE ADULT - ASSESSMENT
83 yo female p/w fall and right sideded weakness, found to have intracranial  intraparenchymal hemorrhage    #Intraparenchymal hemorrhage  - Neurosx rec is to continue keppra for 2 weeks BID, and to hold anticoag for 2 weeks  - Neuro Recommending Keppra continuation, monitor bp, keep between 130-140  - will follow up with neuro and neurosx today    #HTN  -c/w bp meds, adjust with SBP <140  -monitor bp q4h x24hrs    Diet: mechanical soft diet as per speech and swallow    DVT: sub Q heprin  Activity-as tolerated  PT /rehab  Full Code 81 yo female p/w fall and right sideded weakness, found to have intracranial  intraparenchymal hemorrhage    #Intraparenchymal hemorrhage  - Neurosx rec is to continue keppra for 2 weeks BID, and to hold anticoag for 2 weeks  - Neuro Recommending Keppra continuation, monitor bp, keep below 140  - will follow up with neuro and neurosx today    #HTN  -c/w bp meds, adjust with SBP <140  -monitor bp q4h x24hrs    Diet: mechanical soft diet as per speech and swallow    DVT: sub Q heprin  Activity-as tolerated  PT /rehab  Full Code

## 2018-04-27 NOTE — SWALLOW BEDSIDE ASSESSMENT ADULT - SWALLOW EVAL: DIAGNOSIS
+overt s/s of pharyngeal dysphagia w/ large consecutive sips of thin +toleration of controlled sips of thin, puree and soft w/o overt s/s of penetration/aspiration
+toleration of soft and thin w/o any overt s/s of penetration/aspiration

## 2018-04-28 LAB
ANION GAP SERPL CALC-SCNC: 14 MMOL/L — SIGNIFICANT CHANGE UP (ref 7–14)
BASOPHILS # BLD AUTO: 0.03 K/UL — SIGNIFICANT CHANGE UP (ref 0–0.2)
BASOPHILS NFR BLD AUTO: 0.4 % — SIGNIFICANT CHANGE UP (ref 0–1)
BUN SERPL-MCNC: 19 MG/DL — SIGNIFICANT CHANGE UP (ref 10–20)
CALCIUM SERPL-MCNC: 8.6 MG/DL — SIGNIFICANT CHANGE UP (ref 8.5–10.1)
CHLORIDE SERPL-SCNC: 105 MMOL/L — SIGNIFICANT CHANGE UP (ref 98–110)
CO2 SERPL-SCNC: 27 MMOL/L — SIGNIFICANT CHANGE UP (ref 17–32)
CREAT SERPL-MCNC: 0.5 MG/DL — LOW (ref 0.7–1.5)
EOSINOPHIL # BLD AUTO: 3.15 K/UL — HIGH (ref 0–0.7)
EOSINOPHIL NFR BLD AUTO: 41.5 % — HIGH (ref 0–8)
GLUCOSE SERPL-MCNC: 85 MG/DL — SIGNIFICANT CHANGE UP (ref 70–99)
HCT VFR BLD CALC: 35.3 % — LOW (ref 37–47)
HGB BLD-MCNC: 11.5 G/DL — LOW (ref 12–16)
IMM GRANULOCYTES NFR BLD AUTO: 0.5 % — HIGH (ref 0.1–0.3)
LYMPHOCYTES # BLD AUTO: 0.52 K/UL — LOW (ref 1.2–3.4)
LYMPHOCYTES # BLD AUTO: 6.9 % — LOW (ref 20.5–51.1)
MAGNESIUM SERPL-MCNC: 1.8 MG/DL — SIGNIFICANT CHANGE UP (ref 1.8–2.4)
MCHC RBC-ENTMCNC: 27.3 PG — SIGNIFICANT CHANGE UP (ref 27–31)
MCHC RBC-ENTMCNC: 32.6 G/DL — SIGNIFICANT CHANGE UP (ref 32–37)
MCV RBC AUTO: 83.6 FL — SIGNIFICANT CHANGE UP (ref 81–99)
MONOCYTES # BLD AUTO: 0.2 K/UL — SIGNIFICANT CHANGE UP (ref 0.1–0.6)
MONOCYTES NFR BLD AUTO: 2.6 % — SIGNIFICANT CHANGE UP (ref 1.7–9.3)
NEUTROPHILS # BLD AUTO: 3.65 K/UL — SIGNIFICANT CHANGE UP (ref 1.4–6.5)
NEUTROPHILS NFR BLD AUTO: 48.1 % — SIGNIFICANT CHANGE UP (ref 42.2–75.2)
NRBC # BLD: 0 /100 WBCS — SIGNIFICANT CHANGE UP (ref 0–0)
PHOSPHATE SERPL-MCNC: 3.8 MG/DL — SIGNIFICANT CHANGE UP (ref 2.1–4.9)
PLATELET # BLD AUTO: 248 K/UL — SIGNIFICANT CHANGE UP (ref 130–400)
POTASSIUM SERPL-MCNC: 3.9 MMOL/L — SIGNIFICANT CHANGE UP (ref 3.5–5)
POTASSIUM SERPL-SCNC: 3.9 MMOL/L — SIGNIFICANT CHANGE UP (ref 3.5–5)
RBC # BLD: 4.22 M/UL — SIGNIFICANT CHANGE UP (ref 4.2–5.4)
RBC # FLD: 13.7 % — SIGNIFICANT CHANGE UP (ref 11.5–14.5)
SODIUM SERPL-SCNC: 146 MMOL/L — SIGNIFICANT CHANGE UP (ref 135–146)
WBC # BLD: 7.59 K/UL — SIGNIFICANT CHANGE UP (ref 4.8–10.8)
WBC # FLD AUTO: 7.59 K/UL — SIGNIFICANT CHANGE UP (ref 4.8–10.8)

## 2018-04-28 RX ADMIN — Medication 60 MILLIGRAM(S): at 06:00

## 2018-04-28 RX ADMIN — ATORVASTATIN CALCIUM 40 MILLIGRAM(S): 80 TABLET, FILM COATED ORAL at 21:47

## 2018-04-28 RX ADMIN — LEVETIRACETAM 420 MILLIGRAM(S): 250 TABLET, FILM COATED ORAL at 19:28

## 2018-04-28 RX ADMIN — HEPARIN SODIUM 5000 UNIT(S): 5000 INJECTION INTRAVENOUS; SUBCUTANEOUS at 06:02

## 2018-04-28 RX ADMIN — Medication 200 MILLIGRAM(S): at 21:47

## 2018-04-28 RX ADMIN — HEPARIN SODIUM 5000 UNIT(S): 5000 INJECTION INTRAVENOUS; SUBCUTANEOUS at 14:57

## 2018-04-28 RX ADMIN — Medication 200 MILLIGRAM(S): at 14:56

## 2018-04-28 RX ADMIN — HEPARIN SODIUM 5000 UNIT(S): 5000 INJECTION INTRAVENOUS; SUBCUTANEOUS at 21:47

## 2018-04-28 RX ADMIN — Medication 200 MILLIGRAM(S): at 06:00

## 2018-04-28 RX ADMIN — LEVETIRACETAM 420 MILLIGRAM(S): 250 TABLET, FILM COATED ORAL at 05:59

## 2018-04-28 NOTE — PROGRESS NOTE ADULT - SUBJECTIVE AND OBJECTIVE BOX
EMILIANO ANAYA  82y Female    INTERVAL HPI/OVERNIGHT EVENTS:    Daughters at bedside.  Pt feels OK. Sat in a chair earlier today.   Ate a little for breakfast - PO intake encouraged.   Had BM today.     T(F): 97.6 (04-28-18 @ 07:20), Max: 97.6 (04-27-18 @ 16:02)  HR: 56 (04-28-18 @ 07:20) (56 - 88)  BP: 98/49 (04-28-18 @ 07:20) (98/49 - 149/66)  RR: 16    I&O's Summary    27 Apr 2018 07:01  -  28 Apr 2018 07:00  --------------------------------------------------------  IN: 0 mL / OUT: 400 mL / NET: -400 mL    28 Apr 2018 07:01  -  28 Apr 2018 13:27  --------------------------------------------------------  IN: 0 mL / OUT: 350 mL / NET: -350 mL    PHYSICAL EXAM:  GENERAL: NAD  HEAD:  Normocephalic  EYES:  conjunctiva and sclera clear  ENMT: Moist mucous membranes, Right facial droop  NECK: Supple  NERVOUS SYSTEM:  Alert, awake,  fair concentration  Right UE and LE 0/5  moves left side  CHEST/LUNG: Clear to percussion bilaterally; No rales, rhonchi, wheezing  HEART: Regular rate and rhythm; No murmurs  ABDOMEN: Soft, Nontender, Nondistended; Bowel sounds present  EXTREMITIES:   No edema    Consultant(s) Notes Reviewed:  [x ] YES  [ ] NO  Care Discussed with Consultants/Other Providers [ x] YES  [ ] NO    MEDICATIONS  (STANDING):  atorvastatin 40 milliGRAM(s) Oral at bedtime  docusate sodium 100 milliGRAM(s) Oral daily  heparin  Injectable 5000 Unit(s) SubCutaneous every 8 hours  labetalol 200 milliGRAM(s) Oral every 8 hours  levETIRAcetam  IVPB 500 milliGRAM(s) IV Intermittent every 12 hours  NIFEdipine XL 60 milliGRAM(s) Oral daily  polyethylene glycol 3350 17 Gram(s) Oral every 12 hours  senna 2 Tablet(s) Oral at bedtime    MEDICATIONS  (PRN):  acetaminophen   Tablet. 650 milliGRAM(s) Oral every 6 hours PRN Mild Pain (1 - 3)      LABS:                        11.5   7.59  )-----------( 248      ( 28 Apr 2018 05:10 )             35.3     04-28    146  |  105  |  19  ----------------------------<  85  3.9   |  27  |  0.5<L>    Ca    8.6      28 Apr 2018 05:10  Phos  3.8     04-28  Mg     1.8     04-28            RADIOLOGY & ADDITIONAL TESTS:    Imaging or report Personally Reviewed:  [x ] YES  [ ] NO    < from: CT Head No Cont (04.24.18 @ 16:02) >  IMPRESSION:    No significant interval change in the left parietal lobar parenchymal   hematoma measuring up to 4.5 cm (AP) with mild surrounding edema and   partial compression of the left lateral ventricle.    < end of copied text >        Care discussed with pt's family

## 2018-04-28 NOTE — PROGRESS NOTE ADULT - ASSESSMENT
1. ICH likely due to hypertension - pt with right sided weakness, dysphagia  BP better now but avoid hypotension  goal SBP <140  on Keppra x 2 weeks total per Neurology  Neurology f/u appreciated  MRI of head pending  OT/PT  OOB to chair daily  diet per Speech and swallow  speech therapy  DVT prophylaxis - on heparin SQ  guarded prognosis  possible candidate for inpt rehab    2. HTN - continue current management for now

## 2018-04-29 RX ADMIN — Medication 100 MILLIGRAM(S): at 14:34

## 2018-04-29 RX ADMIN — Medication 60 MILLIGRAM(S): at 06:34

## 2018-04-29 RX ADMIN — HEPARIN SODIUM 5000 UNIT(S): 5000 INJECTION INTRAVENOUS; SUBCUTANEOUS at 21:35

## 2018-04-29 RX ADMIN — Medication 200 MILLIGRAM(S): at 21:35

## 2018-04-29 RX ADMIN — ATORVASTATIN CALCIUM 40 MILLIGRAM(S): 80 TABLET, FILM COATED ORAL at 21:35

## 2018-04-29 RX ADMIN — Medication 200 MILLIGRAM(S): at 06:34

## 2018-04-29 RX ADMIN — HEPARIN SODIUM 5000 UNIT(S): 5000 INJECTION INTRAVENOUS; SUBCUTANEOUS at 06:34

## 2018-04-29 RX ADMIN — HEPARIN SODIUM 5000 UNIT(S): 5000 INJECTION INTRAVENOUS; SUBCUTANEOUS at 14:35

## 2018-04-29 RX ADMIN — LEVETIRACETAM 420 MILLIGRAM(S): 250 TABLET, FILM COATED ORAL at 06:34

## 2018-04-29 RX ADMIN — LEVETIRACETAM 420 MILLIGRAM(S): 250 TABLET, FILM COATED ORAL at 17:47

## 2018-04-29 RX ADMIN — POLYETHYLENE GLYCOL 3350 17 GRAM(S): 17 POWDER, FOR SOLUTION ORAL at 17:47

## 2018-04-29 RX ADMIN — Medication 200 MILLIGRAM(S): at 14:35

## 2018-04-29 NOTE — PROGRESS NOTE ADULT - SUBJECTIVE AND OBJECTIVE BOX
EMILIANO ANAYA  82y Female    INTERVAL HPI/OVERNIGHT EVENTS:    Pt was sleeping comfortably when I saw her earlier. No complaints. Ate some breakfast.  BP dropped after AM meds were given.    T(F): 96.7 (04-29-18 @ 07:30), Max: 96.9 (04-29-18 @ 02:38)  HR: 61 (04-29-18 @ 10:00) (61 - 83)  BP: 119/56 (04-29-18 @ 10:00) (98/50 - 177/73)  RR: 18 (04-29-18 @ 10:00) (18 - 18)    I&O's Summary    28 Apr 2018 07:01  -  29 Apr 2018 07:00  --------------------------------------------------------  IN: 0 mL / OUT: 750 mL / NET: -750 mL    PHYSICAL EXAM:  GENERAL: NAD  HEAD:  Normocephalic  EYES:  conjunctiva and sclera clear  ENMT: Moist mucous membranes  NECK: Supple  NERVOUS SYSTEM:  Alert, awake, Good concentration  follows commands  Right LE 0/5, Right UE - squeezes with her hand, Left UE and LE 5/5  Right facial droop  CHEST/LUNG: Clear to percussion bilaterally - shallow effort  HEART: Regular rate and rhythm  ABDOMEN: Soft, Nontender, Nondistended; Bowel sounds present  EXTREMITIES:   No edema of LE    Consultant(s) Notes Reviewed:  [x ] YES  [ ] NO  Care Discussed with Consultants/Other Providers [ x] YES  [ ] NO    MEDICATIONS  (STANDING):  atorvastatin 40 milliGRAM(s) Oral at bedtime  docusate sodium 100 milliGRAM(s) Oral daily  heparin  Injectable 5000 Unit(s) SubCutaneous every 8 hours  labetalol 200 milliGRAM(s) Oral every 8 hours  levETIRAcetam  IVPB 500 milliGRAM(s) IV Intermittent every 12 hours  NIFEdipine XL 60 milliGRAM(s) Oral daily  polyethylene glycol 3350 17 Gram(s) Oral every 12 hours  senna 2 Tablet(s) Oral at bedtime    MEDICATIONS  (PRN):  acetaminophen   Tablet. 650 milliGRAM(s) Oral every 6 hours PRN Mild Pain (1 - 3)      LABS:                        11.5   7.59  )-----------( 248      ( 28 Apr 2018 05:10 )             35.3     04-28    146  |  105  |  19  ----------------------------<  85  3.9   |  27  |  0.5<L>    Ca    8.6      28 Apr 2018 05:10  Phos  3.8     04-28  Mg     1.8     04-28          Case discussed with RN    Care discussed with pt

## 2018-04-29 NOTE — PROGRESS NOTE ADULT - ASSESSMENT
1. ICH likely due to hypertension - pt with right sided weakness, dysphagia  BP dropped today after meds were given  keep SBP > 100 but < 140  on Keppra x 2 weeks total per Neurology  Neurology f/u appreciated  MRI of head pending  OT/PT  OOB to chair daily  diet per Speech and swallow  speech therapy  DVT prophylaxis - on heparin SQ  guarded prognosis  possible candidate for inpt rehab    2. HTN - on labetatol and procardia  if pt unable to swallow procardia, then can change to lisinopril 5mg daily and monitor

## 2018-04-30 ENCOUNTER — INPATIENT (INPATIENT)
Facility: HOSPITAL | Age: 83
LOS: 22 days | Discharge: NURSING, REHAB OR LONG-TERM CARE | End: 2018-05-23
Attending: PHYSICAL MEDICINE & REHABILITATION | Admitting: PHYSICAL MEDICINE & REHABILITATION
Payer: MEDICARE

## 2018-04-30 ENCOUNTER — TRANSCRIPTION ENCOUNTER (OUTPATIENT)
Age: 83
End: 2018-04-30

## 2018-04-30 VITALS
HEART RATE: 66 BPM | TEMPERATURE: 98 F | SYSTOLIC BLOOD PRESSURE: 126 MMHG | RESPIRATION RATE: 18 BRPM | DIASTOLIC BLOOD PRESSURE: 58 MMHG

## 2018-04-30 RX ORDER — SIMVASTATIN 20 MG/1
1 TABLET, FILM COATED ORAL
Qty: 0 | Refills: 0 | COMMUNITY

## 2018-04-30 RX ORDER — SENNA PLUS 8.6 MG/1
2 TABLET ORAL
Qty: 0 | Refills: 0 | COMMUNITY
Start: 2018-04-30

## 2018-04-30 RX ORDER — LEVETIRACETAM 250 MG/1
500 TABLET, FILM COATED ORAL
Qty: 0 | Refills: 0 | Status: DISCONTINUED | OUTPATIENT
Start: 2018-04-30 | End: 2018-04-30

## 2018-04-30 RX ORDER — POLYETHYLENE GLYCOL 3350 17 G/17G
17 POWDER, FOR SOLUTION ORAL
Qty: 0 | Refills: 0 | COMMUNITY
Start: 2018-04-30

## 2018-04-30 RX ORDER — NIFEDIPINE 30 MG
90 TABLET, EXTENDED RELEASE 24 HR ORAL DAILY
Qty: 0 | Refills: 0 | Status: DISCONTINUED | OUTPATIENT
Start: 2018-05-01 | End: 2018-04-30

## 2018-04-30 RX ORDER — SENNA PLUS 8.6 MG/1
2 TABLET ORAL AT BEDTIME
Qty: 0 | Refills: 0 | Status: DISCONTINUED | OUTPATIENT
Start: 2018-04-30 | End: 2018-05-23

## 2018-04-30 RX ORDER — DOCUSATE SODIUM 100 MG
100 CAPSULE ORAL DAILY
Qty: 0 | Refills: 0 | Status: DISCONTINUED | OUTPATIENT
Start: 2018-04-30 | End: 2018-05-23

## 2018-04-30 RX ORDER — ACETAMINOPHEN 500 MG
650 TABLET ORAL EVERY 4 HOURS
Qty: 0 | Refills: 0 | Status: DISCONTINUED | OUTPATIENT
Start: 2018-04-30 | End: 2018-05-23

## 2018-04-30 RX ORDER — NIFEDIPINE 30 MG
30 TABLET, EXTENDED RELEASE 24 HR ORAL ONCE
Qty: 0 | Refills: 0 | Status: COMPLETED | OUTPATIENT
Start: 2018-04-30 | End: 2018-04-30

## 2018-04-30 RX ORDER — TAMSULOSIN HYDROCHLORIDE 0.4 MG/1
0.4 CAPSULE ORAL AT BEDTIME
Qty: 0 | Refills: 0 | Status: DISCONTINUED | OUTPATIENT
Start: 2018-04-30 | End: 2018-05-15

## 2018-04-30 RX ORDER — ATORVASTATIN CALCIUM 80 MG/1
1 TABLET, FILM COATED ORAL
Qty: 0 | Refills: 0 | COMMUNITY
Start: 2018-04-30

## 2018-04-30 RX ORDER — NIFEDIPINE 30 MG
90 TABLET, EXTENDED RELEASE 24 HR ORAL DAILY
Qty: 0 | Refills: 0 | Status: DISCONTINUED | OUTPATIENT
Start: 2018-04-30 | End: 2018-05-23

## 2018-04-30 RX ORDER — SIMVASTATIN 20 MG/1
1 TABLET, FILM COATED ORAL
Qty: 0 | Refills: 0 | COMMUNITY
Start: 2018-04-30

## 2018-04-30 RX ORDER — LEVETIRACETAM 250 MG/1
500 TABLET, FILM COATED ORAL EVERY 12 HOURS
Qty: 0 | Refills: 0 | Status: DISCONTINUED | OUTPATIENT
Start: 2018-04-30 | End: 2018-05-08

## 2018-04-30 RX ORDER — LEVETIRACETAM 250 MG/1
1 TABLET, FILM COATED ORAL
Qty: 0 | Refills: 0 | COMMUNITY
Start: 2018-04-30 | End: 2018-05-07

## 2018-04-30 RX ORDER — HYDRALAZINE HCL 50 MG
10 TABLET ORAL ONCE
Qty: 0 | Refills: 0 | Status: COMPLETED | OUTPATIENT
Start: 2018-04-30 | End: 2018-04-30

## 2018-04-30 RX ORDER — LABETALOL HCL 100 MG
200 TABLET ORAL EVERY 8 HOURS
Qty: 0 | Refills: 0 | Status: DISCONTINUED | OUTPATIENT
Start: 2018-04-30 | End: 2018-05-18

## 2018-04-30 RX ORDER — LABETALOL HCL 100 MG
1 TABLET ORAL
Qty: 0 | Refills: 0 | COMMUNITY
Start: 2018-04-30

## 2018-04-30 RX ORDER — DOCUSATE SODIUM 100 MG
1 CAPSULE ORAL
Qty: 0 | Refills: 0 | COMMUNITY
Start: 2018-04-30

## 2018-04-30 RX ORDER — POLYETHYLENE GLYCOL 3350 17 G/17G
17 POWDER, FOR SOLUTION ORAL EVERY 12 HOURS
Qty: 0 | Refills: 0 | Status: DISCONTINUED | OUTPATIENT
Start: 2018-04-30 | End: 2018-05-23

## 2018-04-30 RX ORDER — ASPIRIN/CALCIUM CARB/MAGNESIUM 324 MG
1 TABLET ORAL
Qty: 0 | Refills: 0 | COMMUNITY

## 2018-04-30 RX ORDER — SIMVASTATIN 20 MG/1
40 TABLET, FILM COATED ORAL AT BEDTIME
Qty: 0 | Refills: 0 | Status: DISCONTINUED | OUTPATIENT
Start: 2018-04-30 | End: 2018-05-04

## 2018-04-30 RX ORDER — HEPARIN SODIUM 5000 [USP'U]/ML
5000 INJECTION INTRAVENOUS; SUBCUTANEOUS EVERY 8 HOURS
Qty: 0 | Refills: 0 | Status: DISCONTINUED | OUTPATIENT
Start: 2018-04-30 | End: 2018-05-23

## 2018-04-30 RX ORDER — NIFEDIPINE 30 MG
1 TABLET, EXTENDED RELEASE 24 HR ORAL
Qty: 0 | Refills: 0 | COMMUNITY
Start: 2018-04-30

## 2018-04-30 RX ORDER — ATORVASTATIN CALCIUM 80 MG/1
40 TABLET, FILM COATED ORAL AT BEDTIME
Qty: 0 | Refills: 0 | Status: DISCONTINUED | OUTPATIENT
Start: 2018-04-30 | End: 2018-05-23

## 2018-04-30 RX ORDER — SIMETHICONE 80 MG/1
80 TABLET, CHEWABLE ORAL EVERY 4 HOURS
Qty: 0 | Refills: 0 | Status: DISCONTINUED | OUTPATIENT
Start: 2018-04-30 | End: 2018-05-23

## 2018-04-30 RX ADMIN — Medication 10 MILLIGRAM(S): at 01:47

## 2018-04-30 RX ADMIN — LEVETIRACETAM 500 MILLIGRAM(S): 250 TABLET, FILM COATED ORAL at 18:25

## 2018-04-30 RX ADMIN — Medication 200 MILLIGRAM(S): at 21:36

## 2018-04-30 RX ADMIN — ATORVASTATIN CALCIUM 40 MILLIGRAM(S): 80 TABLET, FILM COATED ORAL at 21:36

## 2018-04-30 RX ADMIN — HEPARIN SODIUM 5000 UNIT(S): 5000 INJECTION INTRAVENOUS; SUBCUTANEOUS at 21:36

## 2018-04-30 RX ADMIN — Medication 200 MILLIGRAM(S): at 06:10

## 2018-04-30 RX ADMIN — Medication 100 MILLIGRAM(S): at 21:35

## 2018-04-30 RX ADMIN — HEPARIN SODIUM 5000 UNIT(S): 5000 INJECTION INTRAVENOUS; SUBCUTANEOUS at 14:09

## 2018-04-30 RX ADMIN — Medication 90 MILLIGRAM(S): at 21:35

## 2018-04-30 RX ADMIN — Medication 30 MILLIGRAM(S): at 14:09

## 2018-04-30 RX ADMIN — SENNA PLUS 2 TABLET(S): 8.6 TABLET ORAL at 21:36

## 2018-04-30 RX ADMIN — HEPARIN SODIUM 5000 UNIT(S): 5000 INJECTION INTRAVENOUS; SUBCUTANEOUS at 06:10

## 2018-04-30 RX ADMIN — LEVETIRACETAM 420 MILLIGRAM(S): 250 TABLET, FILM COATED ORAL at 06:10

## 2018-04-30 RX ADMIN — TAMSULOSIN HYDROCHLORIDE 0.4 MILLIGRAM(S): 0.4 CAPSULE ORAL at 21:36

## 2018-04-30 RX ADMIN — Medication 1 DROP(S): at 21:35

## 2018-04-30 RX ADMIN — LEVETIRACETAM 500 MILLIGRAM(S): 250 TABLET, FILM COATED ORAL at 21:35

## 2018-04-30 RX ADMIN — Medication 200 MILLIGRAM(S): at 14:09

## 2018-04-30 RX ADMIN — Medication 60 MILLIGRAM(S): at 06:10

## 2018-04-30 NOTE — DISCHARGE NOTE ADULT - PROVIDER TOKENS
FREE:[LAST:[Primary care doctor],PHONE:[(   )    -],FAX:[(   )    -],ADDRESS:[known to patient]],TOKEN:'[id number]'

## 2018-04-30 NOTE — PROGRESS NOTE ADULT - NSHPATTENDINGPLANDISCUSS_GEN_ALL_CORE
neurosurgery Klaus Gonzalez
GERMAN PA/ Med ICU staff
pt/daughter/staff
pt/daughter/staff/neuro
pt/daughter/staff

## 2018-04-30 NOTE — DISCHARGE NOTE ADULT - CARE PROVIDER_API CALL
Primary care doctor,   known to patient  Phone: (   )    -  Fax: (   )    -    Naseem Moreira), EEGEpilepsy; Neurology  [address] [place] [address]  Phone: [phone]  Fax: [phone]

## 2018-04-30 NOTE — DISCHARGE NOTE ADULT - HOSPITAL COURSE
83 yo F w/ PMH of DLD p/w fall and R-sided weakness. Found to have acute intracranial hemorrhage.    #) ICH likely 2/2 HTN  - target BP systolic 100-140  - CT head (4/23): Acute intracranial parenchymal hemorrhage within the left medial posterior parietal region measuring approximately 3.4 cm. Mild surrounding edema without significant mass effect.  - repeat CT head (4/24): stable  - Neuro on board - Rx c/w Keppra for 2 weeks total (last dose May 7), repeat head CT in 1 week, MRI as outpatient in 6-8 weeks w/wo lyndon  - c/w Keppra 500 BID    #) HTN - c/w Labetalol 200 q8h. Increasing Procardia to 90 qd. 81 yo F w/ PMH of DLD p/w fall and R-sided weakness. Found to have acute intracranial hemorrhage.    #) ICH likely 2/2 HTN  - target BP systolic 100-140  - CT head (4/23): Acute intracranial parenchymal hemorrhage within the left medial posterior parietal region measuring approximately 3.4 cm. Mild surrounding edema without significant mass effect.  - repeat CT head (4/24): stable  - Neuro on board - Rx c/w Keppra for 2 weeks total (last dose May 7), repeat head CT in 1 week, MRI as outpatient in 1-2 weeks w/wo lyndon  - c/w Keppra 500 BID    #) HTN - c/w Labetalol 200 q8h. Increasing Procardia to 90 qd.

## 2018-04-30 NOTE — DISCHARGE NOTE ADULT - CARE PLAN
Principal Discharge DX:	Intracranial hemorrhage  Goal:	healing and rehabilitation  Assessment and plan of treatment:	Continue PT and OT to optimize functional independence with ADLs. Follow-up with Neurology - Dr. Warren in 1-2 weeks  Secondary Diagnosis:	Urinary retention  Goal:	independent bladder control and complete emptying  Assessment and plan of treatment:	Continue tamsulosin. Please do trial of void  Secondary Diagnosis:	Hypertension  Goal:	blood pressure control  Assessment and plan of treatment:	Maintain systolic BP <140. Continue medications as prescribed. Follow-up with your PMD - Dr. Silva in 1 week  Secondary Diagnosis:	HLD (hyperlipidemia)  Assessment and plan of treatment:	Continue atorvastatin Principal Discharge DX:	Intracranial hemorrhage  Goal:	healing and rehabilitation  Assessment and plan of treatment:	Continue PT and OT to optimize functional independence with ADLs. Follow-up with Neurology - Dr. Warren in 1-2 weeks  Secondary Diagnosis:	Urinary retention  Goal:	independent bladder control and complete emptying  Assessment and plan of treatment:	Continue tamsulosin. Please do trial of void  Secondary Diagnosis:	Hypertension  Goal:	blood pressure control  Assessment and plan of treatment:	Maintain systolic BP <140. Continue medications as prescribed. Follow-up with your PMD - Dr. Silva in 1 week  Secondary Diagnosis:	HLD (hyperlipidemia)  Assessment and plan of treatment:	Continue atorvastatin  Goal:	Onychomycosis  Assessment and plan of treatment:	Follow-up with Podiatry in 2-3 months

## 2018-04-30 NOTE — DISCHARGE NOTE ADULT - MEDICATION SUMMARY - MEDICATIONS TO TAKE
I will START or STAY ON the medications listed below when I get home from the hospital:    levETIRAcetam 500 mg oral tablet  -- 1 tab(s) by mouth 2 times a day  Last dose May 7.  -- Indication: For Seizure prophylaxis    atorvastatin 40 mg oral tablet  -- 1 tab(s) by mouth once a day (at bedtime)  -- Indication: For ICH    labetalol 200 mg oral tablet  -- 1 tab(s) by mouth every 8 hours  -- Indication: For HTN    NIFEdipine 90 mg oral tablet, extended release  -- 1 tab(s) by mouth once a day  -- Indication: For HTN    docusate sodium 100 mg oral capsule  -- 1 cap(s) by mouth once a day  -- Indication: For Constipation    polyethylene glycol 3350 oral powder for reconstitution  -- 17 gram(s) by mouth every 12 hours  -- Indication: For Constipation    senna oral tablet  -- 2 tab(s) by mouth once a day (at bedtime)  -- Indication: For Constipation I will START or STAY ON the medications listed below when I get home from the hospital:    levETIRAcetam 500 mg oral tablet  -- 1 tab(s) by mouth 2 times a day  Last dose May 7.  -- Indication: For Seizure prophylaxis    simvastatin 40 mg oral tablet  -- 1 tab(s) by mouth once a day (at bedtime)  -- Indication: For Hyperlipidemia    labetalol 200 mg oral tablet  -- 1 tab(s) by mouth every 8 hours  -- Indication: For HTN    NIFEdipine 90 mg oral tablet, extended release  -- 1 tab(s) by mouth once a day  -- Indication: For HTN    docusate sodium 100 mg oral capsule  -- 1 cap(s) by mouth once a day  -- Indication: For Constipation    polyethylene glycol 3350 oral powder for reconstitution  -- 17 gram(s) by mouth every 12 hours  -- Indication: For Constipation    senna oral tablet  -- 2 tab(s) by mouth once a day (at bedtime)  -- Indication: For Constipation

## 2018-04-30 NOTE — PROGRESS NOTE ADULT - ASSESSMENT
83 yo F w/ PMH of DLD p/w fall and R-sided weakness. Found to have acute intracranial hemorrhage.    #) ICH likely 2/2 HTN  - target BP systolic 100-140  - CT head (4/23): Acute intracranial parenchymal hemorrhage within the left medial posterior parietal region measuring approximately 3.4 cm. Mild surrounding edema without significant mass effect.  - repeat CT head (4/24): stable  - Neuro on board - Rx c/w Keppra for 2 weeks total (last dose May 7), repeat head CT in 1 week, MRI as outpatient in 6-8 weeks w/wo lyndon  - c/w Keppra 500 BID    #) HTN - c/w Labetalol 200 q8h. Increasing Procardia to 90 qd.    DVT ppx: Heparin subQ  Code status: FULL  Dispo: 4A - plan for discharge tomorrow 5/1

## 2018-04-30 NOTE — DISCHARGE NOTE ADULT - MEDICATION SUMMARY - MEDICATIONS TO STOP TAKING
I will STOP taking the medications listed below when I get home from the hospital:    levETIRAcetam 500 mg oral tablet  -- 1 tab(s) by mouth 2 times a day  Last dose May 7.

## 2018-04-30 NOTE — DISCHARGE NOTE ADULT - CARE PLAN
Principal Discharge DX:	Intraparenchymal hemorrhage of brain  Goal:	control, rehab, preventing complications  Assessment and plan of treatment:	Take all medications as prescribed. Participate actively in rehab. Follow up with primary care doctor and Neurologist. Need a repeat head CT in 1 week and an MRI in 6-8 weeks, as per Neurology.  Secondary Diagnosis:	Hypertension  Goal:	control  Assessment and plan of treatment:	Take all medications as prescribed. Target systolic blood pressure is 100-140. Principal Discharge DX:	Intraparenchymal hemorrhage of brain  Goal:	control, rehab, preventing complications  Assessment and plan of treatment:	Take all medications as prescribed. Participate actively in rehab. Follow up with primary care doctor and Neurologist. Need a repeat head CT in 1 week before restarting antiplatelets and an MRI in 1-2 weeks, as per Neurology.  Secondary Diagnosis:	Hypertension  Goal:	control  Assessment and plan of treatment:	Take all medications as prescribed. Target systolic blood pressure is below 140

## 2018-04-30 NOTE — PROGRESS NOTE ADULT - SUBJECTIVE AND OBJECTIVE BOX
SUBJECTIVE:    Doing well, no new complaints. Daughter at bedside reports RUE weakness has improved.     PAST MEDICAL & SURGICAL HISTORY  HLD (hyperlipidemia)  No significant past surgical history    SOCIAL HISTORY:  Negative for smoking/alcohol/drug use.     ALLERGIES:  Celebrex (Other (Moderate))  sulfonamides (Unknown)    MEDICATIONS:  STANDING MEDICATIONS  atorvastatin 40 milliGRAM(s) Oral at bedtime  docusate sodium 100 milliGRAM(s) Oral daily  heparin  Injectable 5000 Unit(s) SubCutaneous every 8 hours  labetalol 200 milliGRAM(s) Oral every 8 hours  levETIRAcetam  IVPB 500 milliGRAM(s) IV Intermittent every 12 hours  polyethylene glycol 3350 17 Gram(s) Oral every 12 hours  senna 2 Tablet(s) Oral at bedtime    PRN MEDICATIONS  acetaminophen   Tablet. 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 Daughter at bedside reports RUE weakness has improved.     PAST MEDICAL & SURGICAL HISTORY  HLD (hyperlipidemia)  No significant past surgical history    SOCIAL HISTORY:  Negative for smoking/alcohol/drug use.     ALLERGIES:  Celebrex (Other (Moderate))  sulfonamides (Unknown)    MEDICATIONS:  STANDING MEDICATIONS  atorvastatin 40 milliGRAM(s) Oral at bedtime  docusate sodium 100 milliGRAM(s) Oral daily  heparin  Injectable 5000 Unit(s) SubCutaneous every 8 hours  labetalol 200 milliGRAM(s) Oral every 8 hours  levETIRAcetam  IVPB 500 milliGRAM(s) IV Intermittent every 12 hours  polyethylene glycol 3350 17 Gram(s) Oral every 12 hours  senna 2 Tablet(s) Oral at bedtime    PRN MEDICATIONS  acetaminophen   Tablet. 650 milliGRAM(s) Oral every 6 hours PRN    VITALS:   T(F): 97.7  HR: 66  BP: 126/58  RR: 18  SpO2: 95%    PHYSICAL EXAM:  GEN: NAD, comfortable  Resp: CTAB, no w/r/r  CV RRR, no m/r/g  GI: soft, NT/ND, +BS  MS/EXT: no edema, PP b/l  NEURO: AAOX3, R-sided margaret-paresis, near-complete, has 2/5 hand  strength on R-side but no upper arm movement, RLE 0/5, L-side normal

## 2018-04-30 NOTE — PROGRESS NOTE ADULT - PROVIDER SPECIALTY LIST ADULT
Critical Care
Critical Care
Hospitalist
Internal Medicine
Neurology
Neurology
Neurosurgery
Neurosurgery
Hospitalist
Internal Medicine
Internal Medicine

## 2018-04-30 NOTE — DISCHARGE NOTE ADULT - CARE PROVIDER_API CALL
Nahum Silva), Pediatrics Medicine  [address] [place] [address]  Phone: [phone]  Fax: [phone]    Naseem Moreira), EEGEpilepsy; Neurology  [address] [place] [address]  Phone: [phone]  Fax: [phone] Nahum Silva), Pediatrics Medicine  [address] [place] [address]  Phone: [phone]  Fax: [phone]    Naseem Moreira), EEGEpilepsy; Neurology  [address] [facility]  [address]  [place] [address]  Phone: [phone]  Fax: [phone]    Alonso Hightower), Surgical Physicians  [address] [facility]  1st [address]  [place] [address]  Phone: [phone]  Fax: [phone]

## 2018-04-30 NOTE — DISCHARGE NOTE ADULT - PROVIDER TOKENS
TOKAWILDA:'[id number]:'[id number] TOKEN:'[id number]:'[id number]:'[id number]'

## 2018-04-30 NOTE — DISCHARGE NOTE ADULT - PLAN OF CARE
blood pressure control Maintain systolic BP <140. Continue medications as prescribed. Follow-up with your PMD - Dr. Silva in 1 week Continue atorvastatin healing and rehabilitation Continue PT and OT to optimize functional independence with ADLs. Follow-up with Neurology - Dr. Warren in 1-2 weeks independent bladder control and complete emptying Continue tamsulosin. Please do trial of void Onychomycosis Follow-up with Podiatry in 2-3 months

## 2018-04-30 NOTE — DISCHARGE NOTE ADULT - MEDICATION SUMMARY - MEDICATIONS TO STOP TAKING
I will STOP taking the medications listed below when I get home from the hospital:    aspirin 81 mg oral tablet, chewable  -- 1 tab(s) by mouth once a day    simvastatin 40 mg oral tablet  -- 1 tab(s) by mouth once a day (at bedtime) I will STOP taking the medications listed below when I get home from the hospital:    aspirin 81 mg oral tablet, chewable  -- 1 tab(s) by mouth once a day

## 2018-04-30 NOTE — DISCHARGE NOTE ADULT - REASON FOR ADMISSION
REHAB OF INTRACRANIAL HEMORRHAGE WITH RIGHT HEMIPARESIS    83 yo F with PMH of HTN and HLD admitted to [facility] on 4/20 after being found on the floor by spouse after ~1hr. Patient had R sided weakness, BP of 210/95, and NIHSS of 7. CT Head showed Acute intracranial parenchymal hemorrhage in the left medial posterior parietal region. Repeat CT Head was stable. Follow-up CT Head on 5/8/18 showed slight interval decrease in edema. Hospital course complicated by urinary retention. She had a leaking gonzales and coude catheter was reinserted by Urology on 5/11/18. Patient continues to have leaking around gonzales, likely secondary to bladder spasms. REHAB OF INTRACRANIAL HEMORRHAGE WITH RIGHT HEMIPARESIS    83 yo F with PMH of HTN and HLD admitted to [facility] on 4/20 after being found on the floor by spouse after ~1hr. Patient had R sided weakness, BP of 210/95, and NIHSS of 7. CT Head showed Acute intracranial parenchymal hemorrhage in the left medial posterior parietal region. Repeat CT Head was stable. Follow-up CT Head on 5/8/18 showed slight interval decrease in edema. Hospital course complicated by urinary retention. Patient was started on flomax. She had a leaking gonzales and coude catheter was reinserted by Urology on 5/11/18. Patient continues to have leaking around gonzales, likely secondary to bladder spasms.

## 2018-04-30 NOTE — DISCHARGE NOTE ADULT - CARE PROVIDERS DIRECT ADDRESSES
,DirectAddress_Unknown,[email or web address] ,DirectAddress_Unknown,[email or web address],[email or web address]

## 2018-04-30 NOTE — DISCHARGE NOTE ADULT - HOSPITAL COURSE
81 yo F with PMH of HTN and HLD admitted to [facility] on 4/20 after being found on the floor by spouse after ~1hr. Patient had R sided weakness, BP of 210/95, and NIHSS of 7. CT Head showed Acute intracranial parenchymal hemorrhage in the left medial posterior parietal region. 83 yo F with PMH of HTN and HLD admitted to [facility] on 4/20 after being found on the floor by spouse after ~1hr. Patient had R sided weakness, BP of 210/95, and NIHSS of 7. CT Head showed Acute intracranial parenchymal hemorrhage in the left medial posterior parietal region.     Prior functional status:    Admission Physical Exam:0  Vital signs stable. Afebrile  Gen: alert, NAD  Cardio: RRR  Lungs: clear  Abd: soft, NT  Extremities: without edema. PROM wnl.  Neuro:  Cranial nerves:   Motor Power:   Sensation: intact    Hospital Course: The patient was admitted to the acute inpatient rehab unit presenting with a decline in functional status. The patient participated in three hours of multidisciplinary therapy 5-6 days per week. The patient was continued on all home medications or equivalent alternatives as deemed appropriate. The patient received prophylactic anticoagulation medication and was monitored closely with no complications. During the stay on the inpatient unit, the patient …..[insert rehab course here]..... The patient was stable and cleared for discharge by a multidisciplinary team.        Discharge functional status:     Discharge disposition: short term rehab 81 yo F with PMH of HTN and HLD admitted to [facility] on 4/20 after being found on the floor by spouse after ~1hr. Patient had R sided weakness, BP of 210/95, and NIHSS of 7. CT Head showed Acute intracranial parenchymal hemorrhage in the left medial posterior parietal region.     Prior functional status: Prior to hospital admission, patient was independent without AD. Prior to acute rehab, patient was dependent for transfers    Admission Physical Exam:  Vital signs stable. Afebrile  Gen: alert, NAD  Cardio: RRR  Lungs: clear  Abd: soft, NT  Extremities: without edema. PROM wnl.  Neuro:  Cranial nerves:   Motor Power:   Sensation: intact    Hospital Course: The patient was admitted to the acute inpatient rehab unit presenting with a decline in functional status. The patient participated in three hours of multidisciplinary therapy 5-6 days per week. The patient was continued on all home medications or equivalent alternatives as deemed appropriate. The patient received prophylactic anticoagulation medication and was monitored closely with no complications. During the stay on the inpatient unit, the patient …..[insert rehab course here]..... The patient was stable and cleared for discharge by a multidisciplinary team.        Discharge functional status:     Discharge disposition: short term rehab 83 yo F with PMH of HTN and HLD admitted to [facility] on 4/20 after being found on the floor by spouse after ~1hr. Patient had R sided weakness, BP of 210/95, and NIHSS of 7. CT Head showed Acute intracranial parenchymal hemorrhage in the left medial posterior parietal region.     Prior functional status: Prior to hospital admission, patient was independent without AD. Prior to acute rehab, patient was dependent for transfers    Admission Physical Exam:  Vital signs stable. Afebrile  Gen: alert, oriented to self, place, date  HEENT: R facial droop  Cardio: S1S2  Lungs: CTA  Abd: soft, NT  Extremities: no cyanosis  Speech: dysarthric  Motor Power: LUE/LLE 4/5, Proximal RUE 0/5, R hand 2-3/5, RLE 0/5  Sensation: decreased R side    Hospital Course: The patient was admitted to the acute inpatient rehab unit presenting with a decline in functional status. The patient participated in three hours of multidisciplinary therapy 5-6 days per week. The patient was continued on all home medications or equivalent alternatives as deemed appropriate. The patient received prophylactic anticoagulation medication when cleared by Neuro and was monitored closely with no complications. During the stay on the inpatient unit, the patient had a repeat CT Head which showed a slight decrease in edema. MRI Brain was stable. Neurology evaluated the patient and antiseizure prophylaxis medications were stopped. Patient also had urinary retention and failed TOV on 5/11/18. Coude catheter had to be placed by Urology. Cleveland catheter was leaking due to The patient was stable and cleared for discharge by a multidisciplinary team.        Discharge functional status:     Discharge disposition: short term rehab 81 yo F with PMH of HTN and HLD admitted to [facility] on 4/20 after being found on the floor by spouse after ~1hr. Patient had R sided weakness, BP of 210/95, and NIHSS of 7. CT Head showed Acute intracranial parenchymal hemorrhage in the left medial posterior parietal region.     Prior functional status: Prior to hospital admission, patient was independent without AD. Prior to acute rehab, patient was dependent for transfers    Admission Physical Exam:  Vital signs stable. Afebrile  Gen: alert, oriented to self, place, date  HEENT: R facial droop  Cardio: S1S2  Lungs: CTA  Abd: soft, NT  Extremities: no cyanosis  Speech: dysarthric  Motor Power: LUE/LLE 4/5, Proximal RUE 0/5, R hand 2-3/5, RLE 0/5  Sensation: decreased R side    Hospital Course: The patient was admitted to the acute inpatient rehab unit presenting with a decline in functional status. The patient participated in three hours of multidisciplinary therapy 5-6 days per week. The patient was continued on all home medications or equivalent alternatives as deemed appropriate. The patient received prophylactic anticoagulation medication when cleared by Neuro and was monitored closely with no complications. During the stay on the inpatient unit, the patient had a repeat CT Head which showed a slight decrease in edema. MRI Brain was stable. Neurology evaluated the patient and antiseizure prophylaxis medications were stopped. Patient also had urinary retention and failed TOV on 5/11/18. Coude catheter had to be placed by Urology. Cleveland catheter was leaking. Per Urology, patient likely having intermittent bladder spasms and no intervention required. The patient was stable and cleared for discharge by a multidisciplinary team.      Discharge functional status: Patient required max assist for bed mobility, mod assist for transfers, and max assist for ambulation 15 feet with RW, R AFO. Patient also ambulated 10 feet x 2 in parallel bars with mod A and R AFO    Discharge disposition: short term rehab

## 2018-04-30 NOTE — DISCHARGE NOTE ADULT - PLAN OF CARE
Take all medications as prescribed. Target systolic blood pressure is 100-140. control, rehab, preventing complications Take all medications as prescribed. Participate actively in rehab. Follow up with primary care doctor and Neurologist. Need a repeat head CT in 1 week and an MRI in 6-8 weeks, as per Neurology. control Take all medications as prescribed. Participate actively in rehab. Follow up with primary care doctor and Neurologist. Need a repeat head CT in 1 week before restarting antiplatelets and an MRI in 1-2 weeks, as per Neurology. Take all medications as prescribed. Target systolic blood pressure is below 140

## 2018-04-30 NOTE — DISCHARGE NOTE ADULT - MEDICATION SUMMARY - MEDICATIONS TO TAKE
I will START or STAY ON the medications listed below when I get home from the hospital:    enalapril 2.5 mg oral tablet  -- 1 tab(s) by mouth once a day at noon  -- Indication: For Hypertension    tamsulosin 0.4 mg oral capsule  -- 2 cap(s) by mouth once a day (at bedtime)  -- Indication: For Urinary retention    heparin 5000 units/mL injectable solution  -- 5000 unit(s) injectable every 8 hours  -- Indication: For dvt ppx    sertraline 100 mg oral tablet  -- 1 tab(s) by mouth once a day  -- Indication: For depression    atorvastatin 40 mg oral tablet  -- 1 tab(s) by mouth once a day (at bedtime)  -- Indication: For HLD (hyperlipidemia)    labetalol 200 mg oral tablet  -- 1 tab(s) by mouth every 8 hours  -- Indication: For Hypertension    NIFEdipine 90 mg oral tablet, extended release  -- 1 tab(s) by mouth once a day  -- Indication: For Hypertension    docusate sodium 100 mg oral capsule  -- 1 cap(s) by mouth once a day  -- Indication: For stool softener    polyethylene glycol 3350 oral powder for reconstitution  -- 17 gram(s) by mouth every 12 hours  -- Indication: For laxative    senna oral tablet  -- 2 tab(s) by mouth once a day (at bedtime)  -- Indication: For constipation    ocular lubricant ophthalmic solution  -- 1 drop(s) to right eye every 6 hours while awake  -- Indication: For dry eyes

## 2018-05-01 LAB
ALBUMIN SERPL ELPH-MCNC: 3.5 G/DL — SIGNIFICANT CHANGE UP (ref 3.5–5.2)
ALP SERPL-CCNC: 85 U/L — SIGNIFICANT CHANGE UP (ref 30–115)
ALT FLD-CCNC: 47 U/L — HIGH (ref 0–41)
ANION GAP SERPL CALC-SCNC: 11 MMOL/L — SIGNIFICANT CHANGE UP (ref 7–14)
AST SERPL-CCNC: 34 U/L — SIGNIFICANT CHANGE UP (ref 0–41)
BASOPHILS # BLD AUTO: 0.03 K/UL — SIGNIFICANT CHANGE UP (ref 0–0.2)
BASOPHILS NFR BLD AUTO: 0.5 % — SIGNIFICANT CHANGE UP (ref 0–1)
BILIRUB SERPL-MCNC: 0.4 MG/DL — SIGNIFICANT CHANGE UP (ref 0.2–1.2)
BUN SERPL-MCNC: 16 MG/DL — SIGNIFICANT CHANGE UP (ref 10–20)
CALCIUM SERPL-MCNC: 8.7 MG/DL — SIGNIFICANT CHANGE UP (ref 8.5–10.1)
CHLORIDE SERPL-SCNC: 105 MMOL/L — SIGNIFICANT CHANGE UP (ref 98–110)
CO2 SERPL-SCNC: 29 MMOL/L — SIGNIFICANT CHANGE UP (ref 17–32)
CREAT SERPL-MCNC: 0.5 MG/DL — LOW (ref 0.7–1.5)
EOSINOPHIL # BLD AUTO: 2.6 K/UL — HIGH (ref 0–0.7)
EOSINOPHIL NFR BLD AUTO: 40.2 % — HIGH (ref 0–8)
GLUCOSE SERPL-MCNC: 86 MG/DL — SIGNIFICANT CHANGE UP (ref 70–99)
HCT VFR BLD CALC: 36 % — LOW (ref 37–47)
HGB BLD-MCNC: 11.6 G/DL — LOW (ref 12–16)
IMM GRANULOCYTES NFR BLD AUTO: 0.8 % — HIGH (ref 0.1–0.3)
LYMPHOCYTES # BLD AUTO: 0.53 K/UL — LOW (ref 1.2–3.4)
LYMPHOCYTES # BLD AUTO: 8.2 % — LOW (ref 20.5–51.1)
MCHC RBC-ENTMCNC: 27.1 PG — SIGNIFICANT CHANGE UP (ref 27–31)
MCHC RBC-ENTMCNC: 32.2 G/DL — SIGNIFICANT CHANGE UP (ref 32–37)
MCV RBC AUTO: 84.1 FL — SIGNIFICANT CHANGE UP (ref 81–99)
MONOCYTES # BLD AUTO: 0.28 K/UL — SIGNIFICANT CHANGE UP (ref 0.1–0.6)
MONOCYTES NFR BLD AUTO: 4.3 % — SIGNIFICANT CHANGE UP (ref 1.7–9.3)
NEUTROPHILS # BLD AUTO: 2.98 K/UL — SIGNIFICANT CHANGE UP (ref 1.4–6.5)
NEUTROPHILS NFR BLD AUTO: 46 % — SIGNIFICANT CHANGE UP (ref 42.2–75.2)
NRBC # BLD: 0 /100 WBCS — SIGNIFICANT CHANGE UP (ref 0–0)
PLATELET # BLD AUTO: 210 K/UL — SIGNIFICANT CHANGE UP (ref 130–400)
POTASSIUM SERPL-MCNC: 3.9 MMOL/L — SIGNIFICANT CHANGE UP (ref 3.5–5)
POTASSIUM SERPL-SCNC: 3.9 MMOL/L — SIGNIFICANT CHANGE UP (ref 3.5–5)
PROT SERPL-MCNC: 5.7 G/DL — LOW (ref 6–8)
RBC # BLD: 4.28 M/UL — SIGNIFICANT CHANGE UP (ref 4.2–5.4)
RBC # FLD: 13.6 % — SIGNIFICANT CHANGE UP (ref 11.5–14.5)
SODIUM SERPL-SCNC: 145 MMOL/L — SIGNIFICANT CHANGE UP (ref 135–146)
WBC # BLD: 6.47 K/UL — SIGNIFICANT CHANGE UP (ref 4.8–10.8)
WBC # FLD AUTO: 6.47 K/UL — SIGNIFICANT CHANGE UP (ref 4.8–10.8)

## 2018-05-01 RX ADMIN — Medication 1 DROP(S): at 18:54

## 2018-05-01 RX ADMIN — HEPARIN SODIUM 5000 UNIT(S): 5000 INJECTION INTRAVENOUS; SUBCUTANEOUS at 22:24

## 2018-05-01 RX ADMIN — Medication 200 MILLIGRAM(S): at 22:25

## 2018-05-01 RX ADMIN — LEVETIRACETAM 500 MILLIGRAM(S): 250 TABLET, FILM COATED ORAL at 18:54

## 2018-05-01 RX ADMIN — TAMSULOSIN HYDROCHLORIDE 0.4 MILLIGRAM(S): 0.4 CAPSULE ORAL at 22:24

## 2018-05-01 RX ADMIN — LEVETIRACETAM 500 MILLIGRAM(S): 250 TABLET, FILM COATED ORAL at 06:18

## 2018-05-01 RX ADMIN — Medication 200 MILLIGRAM(S): at 06:18

## 2018-05-01 RX ADMIN — Medication 90 MILLIGRAM(S): at 06:18

## 2018-05-01 RX ADMIN — HEPARIN SODIUM 5000 UNIT(S): 5000 INJECTION INTRAVENOUS; SUBCUTANEOUS at 14:29

## 2018-05-01 RX ADMIN — SENNA PLUS 2 TABLET(S): 8.6 TABLET ORAL at 22:26

## 2018-05-01 RX ADMIN — ATORVASTATIN CALCIUM 40 MILLIGRAM(S): 80 TABLET, FILM COATED ORAL at 22:25

## 2018-05-01 RX ADMIN — HEPARIN SODIUM 5000 UNIT(S): 5000 INJECTION INTRAVENOUS; SUBCUTANEOUS at 06:18

## 2018-05-01 RX ADMIN — Medication 200 MILLIGRAM(S): at 14:29

## 2018-05-01 RX ADMIN — Medication 1 DROP(S): at 06:18

## 2018-05-02 LAB — VIT D25+D1,25 OH+D1,25 PNL SERPL-MCNC: 32 PG/ML — SIGNIFICANT CHANGE UP (ref 19.9–79.3)

## 2018-05-02 RX ADMIN — POLYETHYLENE GLYCOL 3350 17 GRAM(S): 17 POWDER, FOR SOLUTION ORAL at 06:54

## 2018-05-02 RX ADMIN — Medication 1 DROP(S): at 12:14

## 2018-05-02 RX ADMIN — LEVETIRACETAM 500 MILLIGRAM(S): 250 TABLET, FILM COATED ORAL at 17:32

## 2018-05-02 RX ADMIN — Medication 200 MILLIGRAM(S): at 21:12

## 2018-05-02 RX ADMIN — HEPARIN SODIUM 5000 UNIT(S): 5000 INJECTION INTRAVENOUS; SUBCUTANEOUS at 12:10

## 2018-05-02 RX ADMIN — HEPARIN SODIUM 5000 UNIT(S): 5000 INJECTION INTRAVENOUS; SUBCUTANEOUS at 06:52

## 2018-05-02 RX ADMIN — LEVETIRACETAM 500 MILLIGRAM(S): 250 TABLET, FILM COATED ORAL at 06:53

## 2018-05-02 RX ADMIN — Medication 1 DROP(S): at 06:54

## 2018-05-02 RX ADMIN — Medication 1 DROP(S): at 21:11

## 2018-05-02 RX ADMIN — Medication 200 MILLIGRAM(S): at 12:15

## 2018-05-02 RX ADMIN — ATORVASTATIN CALCIUM 40 MILLIGRAM(S): 80 TABLET, FILM COATED ORAL at 21:12

## 2018-05-02 RX ADMIN — Medication 200 MILLIGRAM(S): at 06:53

## 2018-05-02 RX ADMIN — HEPARIN SODIUM 5000 UNIT(S): 5000 INJECTION INTRAVENOUS; SUBCUTANEOUS at 21:12

## 2018-05-02 RX ADMIN — Medication 100 MILLIGRAM(S): at 12:10

## 2018-05-02 RX ADMIN — TAMSULOSIN HYDROCHLORIDE 0.4 MILLIGRAM(S): 0.4 CAPSULE ORAL at 21:12

## 2018-05-02 RX ADMIN — SENNA PLUS 2 TABLET(S): 8.6 TABLET ORAL at 21:12

## 2018-05-02 RX ADMIN — Medication 90 MILLIGRAM(S): at 06:52

## 2018-05-03 DIAGNOSIS — R13.13 DYSPHAGIA, PHARYNGEAL PHASE: ICD-10-CM

## 2018-05-03 DIAGNOSIS — I61.9 NONTRAUMATIC INTRACEREBRAL HEMORRHAGE, UNSPECIFIED: ICD-10-CM

## 2018-05-03 DIAGNOSIS — G81.91 HEMIPLEGIA, UNSPECIFIED AFFECTING RIGHT DOMINANT SIDE: ICD-10-CM

## 2018-05-03 DIAGNOSIS — Z88.2 ALLERGY STATUS TO SULFONAMIDES: ICD-10-CM

## 2018-05-03 DIAGNOSIS — Z88.8 ALLERGY STATUS TO OTHER DRUGS, MEDICAMENTS AND BIOLOGICAL SUBSTANCES STATUS: ICD-10-CM

## 2018-05-03 DIAGNOSIS — R47.81 SLURRED SPEECH: ICD-10-CM

## 2018-05-03 DIAGNOSIS — Z87.891 PERSONAL HISTORY OF NICOTINE DEPENDENCE: ICD-10-CM

## 2018-05-03 DIAGNOSIS — R47.01 APHASIA: ICD-10-CM

## 2018-05-03 DIAGNOSIS — R33.9 RETENTION OF URINE, UNSPECIFIED: ICD-10-CM

## 2018-05-03 DIAGNOSIS — G93.6 CEREBRAL EDEMA: ICD-10-CM

## 2018-05-03 DIAGNOSIS — E78.5 HYPERLIPIDEMIA, UNSPECIFIED: ICD-10-CM

## 2018-05-03 DIAGNOSIS — R29.707 NIHSS SCORE 7: ICD-10-CM

## 2018-05-03 DIAGNOSIS — I10 ESSENTIAL (PRIMARY) HYPERTENSION: ICD-10-CM

## 2018-05-03 DIAGNOSIS — R47.1 DYSARTHRIA AND ANARTHRIA: ICD-10-CM

## 2018-05-03 RX ORDER — SERTRALINE 25 MG/1
50 TABLET, FILM COATED ORAL DAILY
Qty: 0 | Refills: 0 | Status: DISCONTINUED | OUTPATIENT
Start: 2018-05-03 | End: 2018-05-17

## 2018-05-03 RX ADMIN — Medication 1 DROP(S): at 12:05

## 2018-05-03 RX ADMIN — Medication 650 MILLIGRAM(S): at 06:49

## 2018-05-03 RX ADMIN — SERTRALINE 50 MILLIGRAM(S): 25 TABLET, FILM COATED ORAL at 17:46

## 2018-05-03 RX ADMIN — Medication 650 MILLIGRAM(S): at 06:41

## 2018-05-03 RX ADMIN — HEPARIN SODIUM 5000 UNIT(S): 5000 INJECTION INTRAVENOUS; SUBCUTANEOUS at 22:05

## 2018-05-03 RX ADMIN — Medication 1 DROP(S): at 06:48

## 2018-05-03 RX ADMIN — Medication 200 MILLIGRAM(S): at 22:05

## 2018-05-03 RX ADMIN — TAMSULOSIN HYDROCHLORIDE 0.4 MILLIGRAM(S): 0.4 CAPSULE ORAL at 22:05

## 2018-05-03 RX ADMIN — LEVETIRACETAM 500 MILLIGRAM(S): 250 TABLET, FILM COATED ORAL at 17:46

## 2018-05-03 RX ADMIN — HEPARIN SODIUM 5000 UNIT(S): 5000 INJECTION INTRAVENOUS; SUBCUTANEOUS at 12:06

## 2018-05-03 RX ADMIN — Medication 200 MILLIGRAM(S): at 12:06

## 2018-05-03 RX ADMIN — HEPARIN SODIUM 5000 UNIT(S): 5000 INJECTION INTRAVENOUS; SUBCUTANEOUS at 06:41

## 2018-05-03 RX ADMIN — Medication 1 DROP(S): at 17:45

## 2018-05-03 RX ADMIN — LEVETIRACETAM 500 MILLIGRAM(S): 250 TABLET, FILM COATED ORAL at 06:40

## 2018-05-03 RX ADMIN — ATORVASTATIN CALCIUM 40 MILLIGRAM(S): 80 TABLET, FILM COATED ORAL at 22:05

## 2018-05-03 RX ADMIN — Medication 200 MILLIGRAM(S): at 06:40

## 2018-05-03 RX ADMIN — Medication 90 MILLIGRAM(S): at 06:40

## 2018-05-04 RX ADMIN — Medication 1 DROP(S): at 12:55

## 2018-05-04 RX ADMIN — Medication 1 DROP(S): at 18:29

## 2018-05-04 RX ADMIN — HEPARIN SODIUM 5000 UNIT(S): 5000 INJECTION INTRAVENOUS; SUBCUTANEOUS at 06:49

## 2018-05-04 RX ADMIN — Medication 1 DROP(S): at 06:50

## 2018-05-04 RX ADMIN — Medication 1 DROP(S): at 00:17

## 2018-05-04 RX ADMIN — Medication 200 MILLIGRAM(S): at 06:49

## 2018-05-04 RX ADMIN — POLYETHYLENE GLYCOL 3350 17 GRAM(S): 17 POWDER, FOR SOLUTION ORAL at 18:28

## 2018-05-04 RX ADMIN — HEPARIN SODIUM 5000 UNIT(S): 5000 INJECTION INTRAVENOUS; SUBCUTANEOUS at 18:28

## 2018-05-04 RX ADMIN — LEVETIRACETAM 500 MILLIGRAM(S): 250 TABLET, FILM COATED ORAL at 06:49

## 2018-05-04 RX ADMIN — LEVETIRACETAM 500 MILLIGRAM(S): 250 TABLET, FILM COATED ORAL at 18:28

## 2018-05-04 RX ADMIN — Medication 90 MILLIGRAM(S): at 06:49

## 2018-05-04 RX ADMIN — TAMSULOSIN HYDROCHLORIDE 0.4 MILLIGRAM(S): 0.4 CAPSULE ORAL at 22:19

## 2018-05-04 RX ADMIN — SERTRALINE 50 MILLIGRAM(S): 25 TABLET, FILM COATED ORAL at 14:28

## 2018-05-04 RX ADMIN — Medication 200 MILLIGRAM(S): at 22:19

## 2018-05-04 RX ADMIN — Medication 200 MILLIGRAM(S): at 18:28

## 2018-05-05 RX ADMIN — HEPARIN SODIUM 5000 UNIT(S): 5000 INJECTION INTRAVENOUS; SUBCUTANEOUS at 06:39

## 2018-05-05 RX ADMIN — Medication 1 DROP(S): at 06:39

## 2018-05-05 RX ADMIN — Medication 200 MILLIGRAM(S): at 13:30

## 2018-05-05 RX ADMIN — Medication 100 MILLIGRAM(S): at 11:37

## 2018-05-05 RX ADMIN — HEPARIN SODIUM 5000 UNIT(S): 5000 INJECTION INTRAVENOUS; SUBCUTANEOUS at 13:30

## 2018-05-05 RX ADMIN — ATORVASTATIN CALCIUM 40 MILLIGRAM(S): 80 TABLET, FILM COATED ORAL at 22:24

## 2018-05-05 RX ADMIN — TAMSULOSIN HYDROCHLORIDE 0.4 MILLIGRAM(S): 0.4 CAPSULE ORAL at 22:25

## 2018-05-05 RX ADMIN — HEPARIN SODIUM 5000 UNIT(S): 5000 INJECTION INTRAVENOUS; SUBCUTANEOUS at 22:24

## 2018-05-05 RX ADMIN — SERTRALINE 50 MILLIGRAM(S): 25 TABLET, FILM COATED ORAL at 11:37

## 2018-05-05 RX ADMIN — LEVETIRACETAM 500 MILLIGRAM(S): 250 TABLET, FILM COATED ORAL at 06:37

## 2018-05-05 RX ADMIN — LEVETIRACETAM 500 MILLIGRAM(S): 250 TABLET, FILM COATED ORAL at 17:14

## 2018-05-05 RX ADMIN — Medication 1 DROP(S): at 11:37

## 2018-05-05 RX ADMIN — Medication 200 MILLIGRAM(S): at 22:24

## 2018-05-05 RX ADMIN — Medication 90 MILLIGRAM(S): at 06:37

## 2018-05-05 RX ADMIN — Medication 200 MILLIGRAM(S): at 06:37

## 2018-05-05 RX ADMIN — Medication 1 DROP(S): at 17:13

## 2018-05-06 RX ADMIN — Medication 200 MILLIGRAM(S): at 13:12

## 2018-05-06 RX ADMIN — HEPARIN SODIUM 5000 UNIT(S): 5000 INJECTION INTRAVENOUS; SUBCUTANEOUS at 13:12

## 2018-05-06 RX ADMIN — ATORVASTATIN CALCIUM 40 MILLIGRAM(S): 80 TABLET, FILM COATED ORAL at 21:34

## 2018-05-06 RX ADMIN — Medication 90 MILLIGRAM(S): at 06:17

## 2018-05-06 RX ADMIN — TAMSULOSIN HYDROCHLORIDE 0.4 MILLIGRAM(S): 0.4 CAPSULE ORAL at 21:35

## 2018-05-06 RX ADMIN — SENNA PLUS 2 TABLET(S): 8.6 TABLET ORAL at 21:34

## 2018-05-06 RX ADMIN — Medication 1 DROP(S): at 03:16

## 2018-05-06 RX ADMIN — Medication 1 DROP(S): at 17:25

## 2018-05-06 RX ADMIN — LEVETIRACETAM 500 MILLIGRAM(S): 250 TABLET, FILM COATED ORAL at 06:17

## 2018-05-06 RX ADMIN — HEPARIN SODIUM 5000 UNIT(S): 5000 INJECTION INTRAVENOUS; SUBCUTANEOUS at 21:35

## 2018-05-06 RX ADMIN — Medication 1 DROP(S): at 06:18

## 2018-05-06 RX ADMIN — Medication 100 MILLIGRAM(S): at 11:10

## 2018-05-06 RX ADMIN — Medication 1 DROP(S): at 11:10

## 2018-05-06 RX ADMIN — Medication 200 MILLIGRAM(S): at 06:17

## 2018-05-06 RX ADMIN — SERTRALINE 50 MILLIGRAM(S): 25 TABLET, FILM COATED ORAL at 11:10

## 2018-05-06 RX ADMIN — LEVETIRACETAM 500 MILLIGRAM(S): 250 TABLET, FILM COATED ORAL at 17:25

## 2018-05-06 RX ADMIN — Medication 200 MILLIGRAM(S): at 21:34

## 2018-05-06 RX ADMIN — HEPARIN SODIUM 5000 UNIT(S): 5000 INJECTION INTRAVENOUS; SUBCUTANEOUS at 06:18

## 2018-05-07 LAB
ANION GAP SERPL CALC-SCNC: 13 MMOL/L — SIGNIFICANT CHANGE UP (ref 7–14)
BUN SERPL-MCNC: 18 MG/DL — SIGNIFICANT CHANGE UP (ref 10–20)
CALCIUM SERPL-MCNC: 9.5 MG/DL — SIGNIFICANT CHANGE UP (ref 8.5–10.1)
CHLORIDE SERPL-SCNC: 100 MMOL/L — SIGNIFICANT CHANGE UP (ref 98–110)
CO2 SERPL-SCNC: 29 MMOL/L — SIGNIFICANT CHANGE UP (ref 17–32)
CREAT SERPL-MCNC: 0.6 MG/DL — LOW (ref 0.7–1.5)
GLUCOSE SERPL-MCNC: 104 MG/DL — HIGH (ref 70–99)
HCT VFR BLD CALC: 39.7 % — SIGNIFICANT CHANGE UP (ref 37–47)
HGB BLD-MCNC: 12.9 G/DL — SIGNIFICANT CHANGE UP (ref 12–16)
MCHC RBC-ENTMCNC: 27.4 PG — SIGNIFICANT CHANGE UP (ref 27–31)
MCHC RBC-ENTMCNC: 32.5 G/DL — SIGNIFICANT CHANGE UP (ref 32–37)
MCV RBC AUTO: 84.3 FL — SIGNIFICANT CHANGE UP (ref 81–99)
NRBC # BLD: 0 /100 WBCS — SIGNIFICANT CHANGE UP (ref 0–0)
PLATELET # BLD AUTO: 248 K/UL — SIGNIFICANT CHANGE UP (ref 130–400)
POTASSIUM SERPL-MCNC: 4.6 MMOL/L — SIGNIFICANT CHANGE UP (ref 3.5–5)
POTASSIUM SERPL-SCNC: 4.6 MMOL/L — SIGNIFICANT CHANGE UP (ref 3.5–5)
RBC # BLD: 4.71 M/UL — SIGNIFICANT CHANGE UP (ref 4.2–5.4)
RBC # FLD: 13.6 % — SIGNIFICANT CHANGE UP (ref 11.5–14.5)
SODIUM SERPL-SCNC: 142 MMOL/L — SIGNIFICANT CHANGE UP (ref 135–146)
WBC # BLD: 7.5 K/UL — SIGNIFICANT CHANGE UP (ref 4.8–10.8)
WBC # FLD AUTO: 7.5 K/UL — SIGNIFICANT CHANGE UP (ref 4.8–10.8)

## 2018-05-07 RX ADMIN — Medication 100 MILLIGRAM(S): at 12:02

## 2018-05-07 RX ADMIN — Medication 1 DROP(S): at 17:50

## 2018-05-07 RX ADMIN — Medication 200 MILLIGRAM(S): at 13:34

## 2018-05-07 RX ADMIN — TAMSULOSIN HYDROCHLORIDE 0.4 MILLIGRAM(S): 0.4 CAPSULE ORAL at 21:10

## 2018-05-07 RX ADMIN — LEVETIRACETAM 500 MILLIGRAM(S): 250 TABLET, FILM COATED ORAL at 06:20

## 2018-05-07 RX ADMIN — POLYETHYLENE GLYCOL 3350 17 GRAM(S): 17 POWDER, FOR SOLUTION ORAL at 17:49

## 2018-05-07 RX ADMIN — Medication 200 MILLIGRAM(S): at 21:10

## 2018-05-07 RX ADMIN — HEPARIN SODIUM 5000 UNIT(S): 5000 INJECTION INTRAVENOUS; SUBCUTANEOUS at 13:34

## 2018-05-07 RX ADMIN — Medication 1 DROP(S): at 06:22

## 2018-05-07 RX ADMIN — Medication 200 MILLIGRAM(S): at 06:20

## 2018-05-07 RX ADMIN — LEVETIRACETAM 500 MILLIGRAM(S): 250 TABLET, FILM COATED ORAL at 17:49

## 2018-05-07 RX ADMIN — Medication 1 DROP(S): at 12:03

## 2018-05-07 RX ADMIN — HEPARIN SODIUM 5000 UNIT(S): 5000 INJECTION INTRAVENOUS; SUBCUTANEOUS at 21:11

## 2018-05-07 RX ADMIN — ATORVASTATIN CALCIUM 40 MILLIGRAM(S): 80 TABLET, FILM COATED ORAL at 21:10

## 2018-05-07 RX ADMIN — Medication 90 MILLIGRAM(S): at 06:20

## 2018-05-07 RX ADMIN — SERTRALINE 50 MILLIGRAM(S): 25 TABLET, FILM COATED ORAL at 12:01

## 2018-05-07 RX ADMIN — SENNA PLUS 2 TABLET(S): 8.6 TABLET ORAL at 21:10

## 2018-05-08 RX ADMIN — LEVETIRACETAM 500 MILLIGRAM(S): 250 TABLET, FILM COATED ORAL at 05:59

## 2018-05-08 RX ADMIN — Medication 1 DROP(S): at 00:21

## 2018-05-08 RX ADMIN — Medication 100 MILLIGRAM(S): at 11:55

## 2018-05-08 RX ADMIN — Medication 1 DROP(S): at 18:14

## 2018-05-08 RX ADMIN — Medication 90 MILLIGRAM(S): at 06:02

## 2018-05-08 RX ADMIN — Medication 200 MILLIGRAM(S): at 22:08

## 2018-05-08 RX ADMIN — Medication 1 DROP(S): at 11:55

## 2018-05-08 RX ADMIN — Medication 200 MILLIGRAM(S): at 06:02

## 2018-05-08 RX ADMIN — SERTRALINE 50 MILLIGRAM(S): 25 TABLET, FILM COATED ORAL at 11:55

## 2018-05-08 RX ADMIN — HEPARIN SODIUM 5000 UNIT(S): 5000 INJECTION INTRAVENOUS; SUBCUTANEOUS at 05:59

## 2018-05-08 RX ADMIN — HEPARIN SODIUM 5000 UNIT(S): 5000 INJECTION INTRAVENOUS; SUBCUTANEOUS at 13:17

## 2018-05-08 RX ADMIN — Medication 1 DROP(S): at 05:58

## 2018-05-08 RX ADMIN — Medication 200 MILLIGRAM(S): at 13:17

## 2018-05-08 RX ADMIN — TAMSULOSIN HYDROCHLORIDE 0.4 MILLIGRAM(S): 0.4 CAPSULE ORAL at 22:07

## 2018-05-08 RX ADMIN — SENNA PLUS 2 TABLET(S): 8.6 TABLET ORAL at 22:08

## 2018-05-09 RX ADMIN — TAMSULOSIN HYDROCHLORIDE 0.4 MILLIGRAM(S): 0.4 CAPSULE ORAL at 22:42

## 2018-05-09 RX ADMIN — Medication 200 MILLIGRAM(S): at 22:42

## 2018-05-09 RX ADMIN — Medication 200 MILLIGRAM(S): at 06:06

## 2018-05-09 RX ADMIN — Medication 200 MILLIGRAM(S): at 16:06

## 2018-05-09 RX ADMIN — POLYETHYLENE GLYCOL 3350 17 GRAM(S): 17 POWDER, FOR SOLUTION ORAL at 06:07

## 2018-05-09 RX ADMIN — Medication 1 DROP(S): at 22:43

## 2018-05-09 RX ADMIN — ATORVASTATIN CALCIUM 40 MILLIGRAM(S): 80 TABLET, FILM COATED ORAL at 22:42

## 2018-05-09 RX ADMIN — Medication 90 MILLIGRAM(S): at 06:07

## 2018-05-09 RX ADMIN — SERTRALINE 50 MILLIGRAM(S): 25 TABLET, FILM COATED ORAL at 16:05

## 2018-05-09 RX ADMIN — Medication 1 DROP(S): at 16:12

## 2018-05-09 RX ADMIN — Medication 1 DROP(S): at 06:05

## 2018-05-09 RX ADMIN — Medication 1 DROP(S): at 06:07

## 2018-05-09 RX ADMIN — HEPARIN SODIUM 5000 UNIT(S): 5000 INJECTION INTRAVENOUS; SUBCUTANEOUS at 16:03

## 2018-05-09 RX ADMIN — Medication 1 DROP(S): at 17:22

## 2018-05-09 RX ADMIN — SENNA PLUS 2 TABLET(S): 8.6 TABLET ORAL at 22:41

## 2018-05-10 RX ORDER — DIAZEPAM 5 MG
5 TABLET ORAL ONCE
Qty: 0 | Refills: 0 | Status: DISCONTINUED | OUTPATIENT
Start: 2018-05-10 | End: 2018-05-10

## 2018-05-10 RX ADMIN — Medication 1 DROP(S): at 15:38

## 2018-05-10 RX ADMIN — TAMSULOSIN HYDROCHLORIDE 0.4 MILLIGRAM(S): 0.4 CAPSULE ORAL at 21:50

## 2018-05-10 RX ADMIN — HEPARIN SODIUM 5000 UNIT(S): 5000 INJECTION INTRAVENOUS; SUBCUTANEOUS at 15:37

## 2018-05-10 RX ADMIN — Medication 5 MILLIGRAM(S): at 19:40

## 2018-05-10 RX ADMIN — HEPARIN SODIUM 5000 UNIT(S): 5000 INJECTION INTRAVENOUS; SUBCUTANEOUS at 06:41

## 2018-05-10 RX ADMIN — SERTRALINE 50 MILLIGRAM(S): 25 TABLET, FILM COATED ORAL at 15:37

## 2018-05-10 RX ADMIN — Medication 200 MILLIGRAM(S): at 06:41

## 2018-05-10 RX ADMIN — Medication 90 MILLIGRAM(S): at 06:42

## 2018-05-10 RX ADMIN — Medication 1 DROP(S): at 06:42

## 2018-05-10 RX ADMIN — ATORVASTATIN CALCIUM 40 MILLIGRAM(S): 80 TABLET, FILM COATED ORAL at 21:50

## 2018-05-10 RX ADMIN — Medication 200 MILLIGRAM(S): at 21:50

## 2018-05-10 RX ADMIN — Medication 200 MILLIGRAM(S): at 15:37

## 2018-05-10 RX ADMIN — SENNA PLUS 2 TABLET(S): 8.6 TABLET ORAL at 21:50

## 2018-05-11 RX ADMIN — ATORVASTATIN CALCIUM 40 MILLIGRAM(S): 80 TABLET, FILM COATED ORAL at 21:50

## 2018-05-11 RX ADMIN — Medication 200 MILLIGRAM(S): at 21:50

## 2018-05-11 RX ADMIN — Medication 200 MILLIGRAM(S): at 06:02

## 2018-05-11 RX ADMIN — Medication 1 DROP(S): at 17:15

## 2018-05-11 RX ADMIN — Medication 200 MILLIGRAM(S): at 13:53

## 2018-05-11 RX ADMIN — SERTRALINE 50 MILLIGRAM(S): 25 TABLET, FILM COATED ORAL at 13:51

## 2018-05-11 RX ADMIN — Medication 90 MILLIGRAM(S): at 06:02

## 2018-05-11 RX ADMIN — TAMSULOSIN HYDROCHLORIDE 0.4 MILLIGRAM(S): 0.4 CAPSULE ORAL at 21:50

## 2018-05-11 RX ADMIN — HEPARIN SODIUM 5000 UNIT(S): 5000 INJECTION INTRAVENOUS; SUBCUTANEOUS at 21:50

## 2018-05-11 RX ADMIN — POLYETHYLENE GLYCOL 3350 17 GRAM(S): 17 POWDER, FOR SOLUTION ORAL at 17:16

## 2018-05-11 RX ADMIN — SENNA PLUS 2 TABLET(S): 8.6 TABLET ORAL at 21:51

## 2018-05-11 RX ADMIN — Medication 100 MILLIGRAM(S): at 13:51

## 2018-05-11 RX ADMIN — HEPARIN SODIUM 5000 UNIT(S): 5000 INJECTION INTRAVENOUS; SUBCUTANEOUS at 13:53

## 2018-05-12 RX ADMIN — Medication 1 DROP(S): at 12:34

## 2018-05-12 RX ADMIN — TAMSULOSIN HYDROCHLORIDE 0.4 MILLIGRAM(S): 0.4 CAPSULE ORAL at 21:45

## 2018-05-12 RX ADMIN — Medication 90 MILLIGRAM(S): at 06:25

## 2018-05-12 RX ADMIN — Medication 200 MILLIGRAM(S): at 12:29

## 2018-05-12 RX ADMIN — HEPARIN SODIUM 5000 UNIT(S): 5000 INJECTION INTRAVENOUS; SUBCUTANEOUS at 12:30

## 2018-05-12 RX ADMIN — Medication 200 MILLIGRAM(S): at 06:25

## 2018-05-12 RX ADMIN — SERTRALINE 50 MILLIGRAM(S): 25 TABLET, FILM COATED ORAL at 12:29

## 2018-05-12 RX ADMIN — SENNA PLUS 2 TABLET(S): 8.6 TABLET ORAL at 21:45

## 2018-05-12 RX ADMIN — Medication 200 MILLIGRAM(S): at 21:44

## 2018-05-12 RX ADMIN — HEPARIN SODIUM 5000 UNIT(S): 5000 INJECTION INTRAVENOUS; SUBCUTANEOUS at 06:25

## 2018-05-12 RX ADMIN — ATORVASTATIN CALCIUM 40 MILLIGRAM(S): 80 TABLET, FILM COATED ORAL at 21:44

## 2018-05-12 RX ADMIN — Medication 1 DROP(S): at 16:46

## 2018-05-12 RX ADMIN — Medication 1 DROP(S): at 06:29

## 2018-05-12 RX ADMIN — Medication 100 MILLIGRAM(S): at 12:29

## 2018-05-12 RX ADMIN — Medication 1 DROP(S): at 21:46

## 2018-05-13 RX ADMIN — Medication 90 MILLIGRAM(S): at 06:22

## 2018-05-13 RX ADMIN — HEPARIN SODIUM 5000 UNIT(S): 5000 INJECTION INTRAVENOUS; SUBCUTANEOUS at 06:22

## 2018-05-13 RX ADMIN — HEPARIN SODIUM 5000 UNIT(S): 5000 INJECTION INTRAVENOUS; SUBCUTANEOUS at 14:58

## 2018-05-13 RX ADMIN — Medication 200 MILLIGRAM(S): at 21:08

## 2018-05-13 RX ADMIN — Medication 200 MILLIGRAM(S): at 06:22

## 2018-05-13 RX ADMIN — ATORVASTATIN CALCIUM 40 MILLIGRAM(S): 80 TABLET, FILM COATED ORAL at 21:08

## 2018-05-13 RX ADMIN — POLYETHYLENE GLYCOL 3350 17 GRAM(S): 17 POWDER, FOR SOLUTION ORAL at 17:49

## 2018-05-13 RX ADMIN — TAMSULOSIN HYDROCHLORIDE 0.4 MILLIGRAM(S): 0.4 CAPSULE ORAL at 21:09

## 2018-05-13 RX ADMIN — Medication 1 DROP(S): at 06:35

## 2018-05-13 RX ADMIN — Medication 1 DROP(S): at 12:11

## 2018-05-13 RX ADMIN — Medication 100 MILLIGRAM(S): at 12:00

## 2018-05-13 RX ADMIN — SERTRALINE 50 MILLIGRAM(S): 25 TABLET, FILM COATED ORAL at 12:01

## 2018-05-13 RX ADMIN — Medication 1 DROP(S): at 17:52

## 2018-05-13 RX ADMIN — Medication 1 DROP(S): at 21:14

## 2018-05-13 RX ADMIN — Medication 200 MILLIGRAM(S): at 14:58

## 2018-05-14 LAB
ANION GAP SERPL CALC-SCNC: 13 MMOL/L — SIGNIFICANT CHANGE UP (ref 7–14)
BUN SERPL-MCNC: 18 MG/DL — SIGNIFICANT CHANGE UP (ref 10–20)
CALCIUM SERPL-MCNC: 9.2 MG/DL — SIGNIFICANT CHANGE UP (ref 8.5–10.1)
CHLORIDE SERPL-SCNC: 100 MMOL/L — SIGNIFICANT CHANGE UP (ref 98–110)
CO2 SERPL-SCNC: 30 MMOL/L — SIGNIFICANT CHANGE UP (ref 17–32)
CREAT SERPL-MCNC: 0.5 MG/DL — LOW (ref 0.7–1.5)
GLUCOSE SERPL-MCNC: 87 MG/DL — SIGNIFICANT CHANGE UP (ref 70–99)
HCT VFR BLD CALC: 39.5 % — SIGNIFICANT CHANGE UP (ref 37–47)
HGB BLD-MCNC: 12.7 G/DL — SIGNIFICANT CHANGE UP (ref 12–16)
MCHC RBC-ENTMCNC: 27.6 PG — SIGNIFICANT CHANGE UP (ref 27–31)
MCHC RBC-ENTMCNC: 32.2 G/DL — SIGNIFICANT CHANGE UP (ref 32–37)
MCV RBC AUTO: 85.9 FL — SIGNIFICANT CHANGE UP (ref 81–99)
NRBC # BLD: 0 /100 WBCS — SIGNIFICANT CHANGE UP (ref 0–0)
PLATELET # BLD AUTO: 295 K/UL — SIGNIFICANT CHANGE UP (ref 130–400)
POTASSIUM SERPL-MCNC: 4.1 MMOL/L — SIGNIFICANT CHANGE UP (ref 3.5–5)
POTASSIUM SERPL-SCNC: 4.1 MMOL/L — SIGNIFICANT CHANGE UP (ref 3.5–5)
RBC # BLD: 4.6 M/UL — SIGNIFICANT CHANGE UP (ref 4.2–5.4)
RBC # FLD: 13.6 % — SIGNIFICANT CHANGE UP (ref 11.5–14.5)
SODIUM SERPL-SCNC: 143 MMOL/L — SIGNIFICANT CHANGE UP (ref 135–146)
WBC # BLD: 7.68 K/UL — SIGNIFICANT CHANGE UP (ref 4.8–10.8)
WBC # FLD AUTO: 7.68 K/UL — SIGNIFICANT CHANGE UP (ref 4.8–10.8)

## 2018-05-14 PROCEDURE — 99221 1ST HOSP IP/OBS SF/LOW 40: CPT

## 2018-05-14 RX ADMIN — HEPARIN SODIUM 5000 UNIT(S): 5000 INJECTION INTRAVENOUS; SUBCUTANEOUS at 21:29

## 2018-05-14 RX ADMIN — HEPARIN SODIUM 5000 UNIT(S): 5000 INJECTION INTRAVENOUS; SUBCUTANEOUS at 15:15

## 2018-05-14 RX ADMIN — Medication 200 MILLIGRAM(S): at 06:34

## 2018-05-14 RX ADMIN — Medication 200 MILLIGRAM(S): at 21:29

## 2018-05-14 RX ADMIN — Medication 90 MILLIGRAM(S): at 06:34

## 2018-05-14 RX ADMIN — Medication 200 MILLIGRAM(S): at 15:16

## 2018-05-14 RX ADMIN — Medication 1 DROP(S): at 23:55

## 2018-05-14 RX ADMIN — ATORVASTATIN CALCIUM 40 MILLIGRAM(S): 80 TABLET, FILM COATED ORAL at 21:29

## 2018-05-14 RX ADMIN — SERTRALINE 50 MILLIGRAM(S): 25 TABLET, FILM COATED ORAL at 12:01

## 2018-05-14 RX ADMIN — HEPARIN SODIUM 5000 UNIT(S): 5000 INJECTION INTRAVENOUS; SUBCUTANEOUS at 06:34

## 2018-05-14 RX ADMIN — SENNA PLUS 2 TABLET(S): 8.6 TABLET ORAL at 21:29

## 2018-05-14 RX ADMIN — TAMSULOSIN HYDROCHLORIDE 0.4 MILLIGRAM(S): 0.4 CAPSULE ORAL at 21:29

## 2018-05-15 RX ORDER — LISINOPRIL 2.5 MG/1
2.5 TABLET ORAL AT BEDTIME
Qty: 0 | Refills: 0 | Status: DISCONTINUED | OUTPATIENT
Start: 2018-05-15 | End: 2018-05-16

## 2018-05-15 RX ORDER — TAMSULOSIN HYDROCHLORIDE 0.4 MG/1
0.8 CAPSULE ORAL AT BEDTIME
Qty: 0 | Refills: 0 | Status: DISCONTINUED | OUTPATIENT
Start: 2018-05-15 | End: 2018-05-23

## 2018-05-15 RX ADMIN — Medication 1 DROP(S): at 21:35

## 2018-05-15 RX ADMIN — TAMSULOSIN HYDROCHLORIDE 0.8 MILLIGRAM(S): 0.4 CAPSULE ORAL at 21:34

## 2018-05-15 RX ADMIN — Medication 1 DROP(S): at 17:46

## 2018-05-15 RX ADMIN — Medication 1 DROP(S): at 06:17

## 2018-05-15 RX ADMIN — HEPARIN SODIUM 5000 UNIT(S): 5000 INJECTION INTRAVENOUS; SUBCUTANEOUS at 13:14

## 2018-05-15 RX ADMIN — Medication 90 MILLIGRAM(S): at 06:18

## 2018-05-15 RX ADMIN — Medication 100 MILLIGRAM(S): at 13:14

## 2018-05-15 RX ADMIN — SERTRALINE 50 MILLIGRAM(S): 25 TABLET, FILM COATED ORAL at 13:14

## 2018-05-15 RX ADMIN — Medication 200 MILLIGRAM(S): at 06:18

## 2018-05-15 RX ADMIN — HEPARIN SODIUM 5000 UNIT(S): 5000 INJECTION INTRAVENOUS; SUBCUTANEOUS at 06:18

## 2018-05-15 RX ADMIN — LISINOPRIL 2.5 MILLIGRAM(S): 2.5 TABLET ORAL at 21:34

## 2018-05-15 RX ADMIN — POLYETHYLENE GLYCOL 3350 17 GRAM(S): 17 POWDER, FOR SOLUTION ORAL at 17:48

## 2018-05-15 RX ADMIN — ATORVASTATIN CALCIUM 40 MILLIGRAM(S): 80 TABLET, FILM COATED ORAL at 21:34

## 2018-05-15 RX ADMIN — Medication 200 MILLIGRAM(S): at 13:14

## 2018-05-15 RX ADMIN — Medication 200 MILLIGRAM(S): at 21:34

## 2018-05-16 RX ADMIN — ATORVASTATIN CALCIUM 40 MILLIGRAM(S): 80 TABLET, FILM COATED ORAL at 22:35

## 2018-05-16 RX ADMIN — SENNA PLUS 2 TABLET(S): 8.6 TABLET ORAL at 22:36

## 2018-05-16 RX ADMIN — Medication 200 MILLIGRAM(S): at 22:34

## 2018-05-16 RX ADMIN — Medication 1 DROP(S): at 06:25

## 2018-05-16 RX ADMIN — Medication 90 MILLIGRAM(S): at 06:25

## 2018-05-16 RX ADMIN — Medication 1 DROP(S): at 23:31

## 2018-05-16 RX ADMIN — Medication 200 MILLIGRAM(S): at 14:08

## 2018-05-16 RX ADMIN — HEPARIN SODIUM 5000 UNIT(S): 5000 INJECTION INTRAVENOUS; SUBCUTANEOUS at 14:08

## 2018-05-16 RX ADMIN — TAMSULOSIN HYDROCHLORIDE 0.8 MILLIGRAM(S): 0.4 CAPSULE ORAL at 22:34

## 2018-05-16 RX ADMIN — Medication 1 DROP(S): at 17:31

## 2018-05-16 RX ADMIN — Medication 100 MILLIGRAM(S): at 11:40

## 2018-05-16 RX ADMIN — SERTRALINE 50 MILLIGRAM(S): 25 TABLET, FILM COATED ORAL at 11:40

## 2018-05-16 RX ADMIN — HEPARIN SODIUM 5000 UNIT(S): 5000 INJECTION INTRAVENOUS; SUBCUTANEOUS at 06:25

## 2018-05-16 RX ADMIN — HEPARIN SODIUM 5000 UNIT(S): 5000 INJECTION INTRAVENOUS; SUBCUTANEOUS at 22:34

## 2018-05-16 RX ADMIN — Medication 200 MILLIGRAM(S): at 06:25

## 2018-05-17 RX ORDER — SERTRALINE 25 MG/1
100 TABLET, FILM COATED ORAL DAILY
Qty: 0 | Refills: 0 | Status: DISCONTINUED | OUTPATIENT
Start: 2018-05-17 | End: 2018-05-23

## 2018-05-17 RX ADMIN — SERTRALINE 100 MILLIGRAM(S): 25 TABLET, FILM COATED ORAL at 13:10

## 2018-05-17 RX ADMIN — HEPARIN SODIUM 5000 UNIT(S): 5000 INJECTION INTRAVENOUS; SUBCUTANEOUS at 07:10

## 2018-05-17 RX ADMIN — Medication 1 DROP(S): at 07:14

## 2018-05-17 RX ADMIN — Medication 650 MILLIGRAM(S): at 17:52

## 2018-05-17 RX ADMIN — Medication 650 MILLIGRAM(S): at 17:50

## 2018-05-17 RX ADMIN — Medication 1 DROP(S): at 17:51

## 2018-05-17 RX ADMIN — Medication 2.5 MILLIGRAM(S): at 21:26

## 2018-05-17 RX ADMIN — Medication 200 MILLIGRAM(S): at 07:10

## 2018-05-17 RX ADMIN — HEPARIN SODIUM 5000 UNIT(S): 5000 INJECTION INTRAVENOUS; SUBCUTANEOUS at 21:26

## 2018-05-17 RX ADMIN — Medication 1 DROP(S): at 13:11

## 2018-05-17 RX ADMIN — TAMSULOSIN HYDROCHLORIDE 0.8 MILLIGRAM(S): 0.4 CAPSULE ORAL at 21:25

## 2018-05-17 RX ADMIN — Medication 90 MILLIGRAM(S): at 07:10

## 2018-05-17 RX ADMIN — ATORVASTATIN CALCIUM 40 MILLIGRAM(S): 80 TABLET, FILM COATED ORAL at 21:25

## 2018-05-17 RX ADMIN — Medication 1 DROP(S): at 23:08

## 2018-05-17 RX ADMIN — SENNA PLUS 2 TABLET(S): 8.6 TABLET ORAL at 21:31

## 2018-05-17 RX ADMIN — HEPARIN SODIUM 5000 UNIT(S): 5000 INJECTION INTRAVENOUS; SUBCUTANEOUS at 13:11

## 2018-05-17 RX ADMIN — Medication 200 MILLIGRAM(S): at 21:25

## 2018-05-17 RX ADMIN — Medication 100 MILLIGRAM(S): at 13:10

## 2018-05-17 RX ADMIN — POLYETHYLENE GLYCOL 3350 17 GRAM(S): 17 POWDER, FOR SOLUTION ORAL at 07:10

## 2018-05-17 RX ADMIN — Medication 200 MILLIGRAM(S): at 13:10

## 2018-05-18 RX ORDER — LABETALOL HCL 100 MG
200 TABLET ORAL EVERY 12 HOURS
Qty: 0 | Refills: 0 | Status: DISCONTINUED | OUTPATIENT
Start: 2018-05-18 | End: 2018-05-19

## 2018-05-18 RX ADMIN — POLYETHYLENE GLYCOL 3350 17 GRAM(S): 17 POWDER, FOR SOLUTION ORAL at 06:14

## 2018-05-18 RX ADMIN — ATORVASTATIN CALCIUM 40 MILLIGRAM(S): 80 TABLET, FILM COATED ORAL at 23:16

## 2018-05-18 RX ADMIN — TAMSULOSIN HYDROCHLORIDE 0.8 MILLIGRAM(S): 0.4 CAPSULE ORAL at 23:17

## 2018-05-18 RX ADMIN — HEPARIN SODIUM 5000 UNIT(S): 5000 INJECTION INTRAVENOUS; SUBCUTANEOUS at 06:14

## 2018-05-18 RX ADMIN — Medication 650 MILLIGRAM(S): at 10:23

## 2018-05-18 RX ADMIN — Medication 90 MILLIGRAM(S): at 06:14

## 2018-05-18 RX ADMIN — Medication 200 MILLIGRAM(S): at 06:14

## 2018-05-18 RX ADMIN — Medication 1 DROP(S): at 17:23

## 2018-05-18 RX ADMIN — HEPARIN SODIUM 5000 UNIT(S): 5000 INJECTION INTRAVENOUS; SUBCUTANEOUS at 17:19

## 2018-05-18 RX ADMIN — Medication 2.5 MILLIGRAM(S): at 23:17

## 2018-05-18 RX ADMIN — Medication 100 MILLIGRAM(S): at 12:01

## 2018-05-18 RX ADMIN — Medication 650 MILLIGRAM(S): at 14:43

## 2018-05-18 RX ADMIN — Medication 1 DROP(S): at 12:04

## 2018-05-18 RX ADMIN — SERTRALINE 100 MILLIGRAM(S): 25 TABLET, FILM COATED ORAL at 12:01

## 2018-05-18 RX ADMIN — Medication 1 DROP(S): at 06:15

## 2018-05-18 RX ADMIN — POLYETHYLENE GLYCOL 3350 17 GRAM(S): 17 POWDER, FOR SOLUTION ORAL at 17:59

## 2018-05-18 RX ADMIN — Medication 200 MILLIGRAM(S): at 17:55

## 2018-05-18 RX ADMIN — Medication 1 DROP(S): at 17:59

## 2018-05-19 RX ORDER — LABETALOL HCL 100 MG
200 TABLET ORAL EVERY 8 HOURS
Qty: 0 | Refills: 0 | Status: DISCONTINUED | OUTPATIENT
Start: 2018-05-19 | End: 2018-05-23

## 2018-05-19 RX ADMIN — HEPARIN SODIUM 5000 UNIT(S): 5000 INJECTION INTRAVENOUS; SUBCUTANEOUS at 06:56

## 2018-05-19 RX ADMIN — Medication 90 MILLIGRAM(S): at 06:56

## 2018-05-19 RX ADMIN — SENNA PLUS 2 TABLET(S): 8.6 TABLET ORAL at 21:45

## 2018-05-19 RX ADMIN — HEPARIN SODIUM 5000 UNIT(S): 5000 INJECTION INTRAVENOUS; SUBCUTANEOUS at 21:45

## 2018-05-19 RX ADMIN — ATORVASTATIN CALCIUM 40 MILLIGRAM(S): 80 TABLET, FILM COATED ORAL at 21:45

## 2018-05-19 RX ADMIN — Medication 200 MILLIGRAM(S): at 21:45

## 2018-05-19 RX ADMIN — Medication 1 DROP(S): at 06:57

## 2018-05-19 RX ADMIN — Medication 100 MILLIGRAM(S): at 11:33

## 2018-05-19 RX ADMIN — HEPARIN SODIUM 5000 UNIT(S): 5000 INJECTION INTRAVENOUS; SUBCUTANEOUS at 13:07

## 2018-05-19 RX ADMIN — TAMSULOSIN HYDROCHLORIDE 0.8 MILLIGRAM(S): 0.4 CAPSULE ORAL at 21:45

## 2018-05-19 RX ADMIN — Medication 1 DROP(S): at 23:18

## 2018-05-19 RX ADMIN — SERTRALINE 100 MILLIGRAM(S): 25 TABLET, FILM COATED ORAL at 11:33

## 2018-05-19 RX ADMIN — Medication 200 MILLIGRAM(S): at 06:57

## 2018-05-19 RX ADMIN — Medication 1 DROP(S): at 11:23

## 2018-05-19 RX ADMIN — Medication 1 DROP(S): at 17:13

## 2018-05-19 RX ADMIN — Medication 200 MILLIGRAM(S): at 16:15

## 2018-05-20 RX ADMIN — Medication 1 DROP(S): at 17:26

## 2018-05-20 RX ADMIN — Medication 2.5 MILLIGRAM(S): at 12:20

## 2018-05-20 RX ADMIN — HEPARIN SODIUM 5000 UNIT(S): 5000 INJECTION INTRAVENOUS; SUBCUTANEOUS at 06:01

## 2018-05-20 RX ADMIN — Medication 100 MILLIGRAM(S): at 12:19

## 2018-05-20 RX ADMIN — Medication 200 MILLIGRAM(S): at 21:34

## 2018-05-20 RX ADMIN — Medication 200 MILLIGRAM(S): at 06:01

## 2018-05-20 RX ADMIN — Medication 1 DROP(S): at 06:12

## 2018-05-20 RX ADMIN — Medication 650 MILLIGRAM(S): at 09:26

## 2018-05-20 RX ADMIN — ATORVASTATIN CALCIUM 40 MILLIGRAM(S): 80 TABLET, FILM COATED ORAL at 21:34

## 2018-05-20 RX ADMIN — HEPARIN SODIUM 5000 UNIT(S): 5000 INJECTION INTRAVENOUS; SUBCUTANEOUS at 13:45

## 2018-05-20 RX ADMIN — SENNA PLUS 2 TABLET(S): 8.6 TABLET ORAL at 21:34

## 2018-05-20 RX ADMIN — Medication 1 DROP(S): at 23:19

## 2018-05-20 RX ADMIN — Medication 200 MILLIGRAM(S): at 13:45

## 2018-05-20 RX ADMIN — Medication 650 MILLIGRAM(S): at 09:25

## 2018-05-20 RX ADMIN — TAMSULOSIN HYDROCHLORIDE 0.8 MILLIGRAM(S): 0.4 CAPSULE ORAL at 21:34

## 2018-05-20 RX ADMIN — SERTRALINE 100 MILLIGRAM(S): 25 TABLET, FILM COATED ORAL at 12:19

## 2018-05-20 RX ADMIN — Medication 1 DROP(S): at 12:17

## 2018-05-20 RX ADMIN — POLYETHYLENE GLYCOL 3350 17 GRAM(S): 17 POWDER, FOR SOLUTION ORAL at 06:01

## 2018-05-20 RX ADMIN — Medication 90 MILLIGRAM(S): at 06:01

## 2018-05-21 LAB
ANION GAP SERPL CALC-SCNC: 13 MMOL/L — SIGNIFICANT CHANGE UP (ref 7–14)
BUN SERPL-MCNC: 18 MG/DL — SIGNIFICANT CHANGE UP (ref 10–20)
CALCIUM SERPL-MCNC: 9.7 MG/DL — SIGNIFICANT CHANGE UP (ref 8.5–10.1)
CHLORIDE SERPL-SCNC: 102 MMOL/L — SIGNIFICANT CHANGE UP (ref 98–110)
CO2 SERPL-SCNC: 28 MMOL/L — SIGNIFICANT CHANGE UP (ref 17–32)
CREAT SERPL-MCNC: 0.6 MG/DL — LOW (ref 0.7–1.5)
GLUCOSE SERPL-MCNC: 101 MG/DL — HIGH (ref 70–99)
HCT VFR BLD CALC: 38.5 % — SIGNIFICANT CHANGE UP (ref 37–47)
HGB BLD-MCNC: 12.6 G/DL — SIGNIFICANT CHANGE UP (ref 12–16)
MCHC RBC-ENTMCNC: 27.6 PG — SIGNIFICANT CHANGE UP (ref 27–31)
MCHC RBC-ENTMCNC: 32.7 G/DL — SIGNIFICANT CHANGE UP (ref 32–37)
MCV RBC AUTO: 84.2 FL — SIGNIFICANT CHANGE UP (ref 81–99)
NRBC # BLD: 0 /100 WBCS — SIGNIFICANT CHANGE UP (ref 0–0)
PLATELET # BLD AUTO: 242 K/UL — SIGNIFICANT CHANGE UP (ref 130–400)
POTASSIUM SERPL-MCNC: 4.6 MMOL/L — SIGNIFICANT CHANGE UP (ref 3.5–5)
POTASSIUM SERPL-SCNC: 4.6 MMOL/L — SIGNIFICANT CHANGE UP (ref 3.5–5)
RBC # BLD: 4.57 M/UL — SIGNIFICANT CHANGE UP (ref 4.2–5.4)
RBC # FLD: 13.5 % — SIGNIFICANT CHANGE UP (ref 11.5–14.5)
SODIUM SERPL-SCNC: 143 MMOL/L — SIGNIFICANT CHANGE UP (ref 135–146)
VIT B12 SERPL-MCNC: 259 PG/ML — SIGNIFICANT CHANGE UP (ref 232–1245)
WBC # BLD: 8.34 K/UL — SIGNIFICANT CHANGE UP (ref 4.8–10.8)
WBC # FLD AUTO: 8.34 K/UL — SIGNIFICANT CHANGE UP (ref 4.8–10.8)

## 2018-05-21 RX ORDER — OXYBUTYNIN CHLORIDE 5 MG
2.5 TABLET ORAL
Qty: 0 | Refills: 0 | Status: DISCONTINUED | OUTPATIENT
Start: 2018-05-21 | End: 2018-05-22

## 2018-05-21 RX ADMIN — Medication 200 MILLIGRAM(S): at 12:00

## 2018-05-21 RX ADMIN — TAMSULOSIN HYDROCHLORIDE 0.8 MILLIGRAM(S): 0.4 CAPSULE ORAL at 21:48

## 2018-05-21 RX ADMIN — Medication 200 MILLIGRAM(S): at 05:56

## 2018-05-21 RX ADMIN — HEPARIN SODIUM 5000 UNIT(S): 5000 INJECTION INTRAVENOUS; SUBCUTANEOUS at 05:57

## 2018-05-21 RX ADMIN — Medication 2.5 MILLIGRAM(S): at 12:01

## 2018-05-21 RX ADMIN — HEPARIN SODIUM 5000 UNIT(S): 5000 INJECTION INTRAVENOUS; SUBCUTANEOUS at 12:00

## 2018-05-21 RX ADMIN — ATORVASTATIN CALCIUM 40 MILLIGRAM(S): 80 TABLET, FILM COATED ORAL at 21:48

## 2018-05-21 RX ADMIN — Medication 2.5 MILLIGRAM(S): at 14:25

## 2018-05-21 RX ADMIN — HEPARIN SODIUM 5000 UNIT(S): 5000 INJECTION INTRAVENOUS; SUBCUTANEOUS at 21:48

## 2018-05-21 RX ADMIN — Medication 1 DROP(S): at 12:04

## 2018-05-21 RX ADMIN — Medication 90 MILLIGRAM(S): at 05:56

## 2018-05-21 RX ADMIN — Medication 1 DROP(S): at 05:59

## 2018-05-21 RX ADMIN — Medication 200 MILLIGRAM(S): at 21:48

## 2018-05-21 RX ADMIN — Medication 650 MILLIGRAM(S): at 18:52

## 2018-05-21 RX ADMIN — SERTRALINE 100 MILLIGRAM(S): 25 TABLET, FILM COATED ORAL at 12:00

## 2018-05-21 RX ADMIN — POLYETHYLENE GLYCOL 3350 17 GRAM(S): 17 POWDER, FOR SOLUTION ORAL at 05:57

## 2018-05-21 RX ADMIN — Medication 100 MILLIGRAM(S): at 12:00

## 2018-05-21 RX ADMIN — SENNA PLUS 2 TABLET(S): 8.6 TABLET ORAL at 21:48

## 2018-05-21 RX ADMIN — Medication 1 DROP(S): at 18:53

## 2018-05-22 RX ORDER — HEPARIN SODIUM 5000 [USP'U]/ML
5000 INJECTION INTRAVENOUS; SUBCUTANEOUS
Qty: 0 | Refills: 0 | COMMUNITY
Start: 2018-05-22

## 2018-05-22 RX ORDER — NIFEDIPINE 30 MG
1 TABLET, EXTENDED RELEASE 24 HR ORAL
Qty: 0 | Refills: 0 | DISCHARGE
Start: 2018-05-22

## 2018-05-22 RX ORDER — SENNA PLUS 8.6 MG/1
2 TABLET ORAL
Qty: 0 | Refills: 0 | DISCHARGE
Start: 2018-05-22

## 2018-05-22 RX ORDER — SERTRALINE 25 MG/1
1 TABLET, FILM COATED ORAL
Qty: 0 | Refills: 0 | DISCHARGE
Start: 2018-05-22

## 2018-05-22 RX ORDER — ATORVASTATIN CALCIUM 80 MG/1
1 TABLET, FILM COATED ORAL
Qty: 0 | Refills: 0 | DISCHARGE
Start: 2018-05-22

## 2018-05-22 RX ORDER — LABETALOL HCL 100 MG
1 TABLET ORAL
Qty: 0 | Refills: 0 | DISCHARGE
Start: 2018-05-22

## 2018-05-22 RX ORDER — TAMSULOSIN HYDROCHLORIDE 0.4 MG/1
2 CAPSULE ORAL
Qty: 0 | Refills: 0 | DISCHARGE
Start: 2018-05-22

## 2018-05-22 RX ORDER — POLYETHYLENE GLYCOL 3350 17 G/17G
17 POWDER, FOR SOLUTION ORAL
Qty: 0 | Refills: 0 | DISCHARGE
Start: 2018-05-22

## 2018-05-22 RX ORDER — DOCUSATE SODIUM 100 MG
1 CAPSULE ORAL
Qty: 0 | Refills: 0 | DISCHARGE
Start: 2018-05-22

## 2018-05-22 RX ADMIN — Medication 1 DROP(S): at 00:06

## 2018-05-22 RX ADMIN — Medication 2.5 MILLIGRAM(S): at 13:46

## 2018-05-22 RX ADMIN — HEPARIN SODIUM 5000 UNIT(S): 5000 INJECTION INTRAVENOUS; SUBCUTANEOUS at 13:48

## 2018-05-22 RX ADMIN — POLYETHYLENE GLYCOL 3350 17 GRAM(S): 17 POWDER, FOR SOLUTION ORAL at 05:24

## 2018-05-22 RX ADMIN — Medication 2.5 MILLIGRAM(S): at 13:47

## 2018-05-22 RX ADMIN — Medication 200 MILLIGRAM(S): at 13:48

## 2018-05-22 RX ADMIN — Medication 1 DROP(S): at 13:47

## 2018-05-22 RX ADMIN — Medication 1 DROP(S): at 05:23

## 2018-05-22 RX ADMIN — Medication 650 MILLIGRAM(S): at 17:29

## 2018-05-22 RX ADMIN — Medication 200 MILLIGRAM(S): at 22:53

## 2018-05-22 RX ADMIN — Medication 650 MILLIGRAM(S): at 22:54

## 2018-05-22 RX ADMIN — TAMSULOSIN HYDROCHLORIDE 0.8 MILLIGRAM(S): 0.4 CAPSULE ORAL at 22:48

## 2018-05-22 RX ADMIN — ATORVASTATIN CALCIUM 40 MILLIGRAM(S): 80 TABLET, FILM COATED ORAL at 22:48

## 2018-05-22 RX ADMIN — Medication 200 MILLIGRAM(S): at 05:24

## 2018-05-22 RX ADMIN — Medication 1 DROP(S): at 17:28

## 2018-05-22 RX ADMIN — HEPARIN SODIUM 5000 UNIT(S): 5000 INJECTION INTRAVENOUS; SUBCUTANEOUS at 06:49

## 2018-05-22 RX ADMIN — Medication 90 MILLIGRAM(S): at 05:24

## 2018-05-22 RX ADMIN — SERTRALINE 100 MILLIGRAM(S): 25 TABLET, FILM COATED ORAL at 11:42

## 2018-05-23 RX ADMIN — POLYETHYLENE GLYCOL 3350 17 GRAM(S): 17 POWDER, FOR SOLUTION ORAL at 05:58

## 2018-05-23 RX ADMIN — Medication 90 MILLIGRAM(S): at 05:58

## 2018-05-23 RX ADMIN — Medication 1 DROP(S): at 12:22

## 2018-05-23 RX ADMIN — Medication 1 DROP(S): at 03:51

## 2018-05-23 RX ADMIN — SERTRALINE 100 MILLIGRAM(S): 25 TABLET, FILM COATED ORAL at 12:21

## 2018-05-23 RX ADMIN — Medication 2.5 MILLIGRAM(S): at 12:22

## 2018-05-23 RX ADMIN — Medication 1 DROP(S): at 06:01

## 2018-05-23 RX ADMIN — Medication 200 MILLIGRAM(S): at 05:58

## 2018-05-23 RX ADMIN — Medication 100 MILLIGRAM(S): at 12:21

## 2018-06-07 DIAGNOSIS — R13.10 DYSPHAGIA, UNSPECIFIED: ICD-10-CM

## 2018-06-07 DIAGNOSIS — R33.9 RETENTION OF URINE, UNSPECIFIED: ICD-10-CM

## 2018-06-07 DIAGNOSIS — I69.251 HEMIPLEGIA AND HEMIPARESIS FOLLOWING OTHER NONTRAUMATIC INTRACRANIAL HEMORRHAGE AFFECTING RIGHT DOMINANT SIDE: ICD-10-CM

## 2018-06-07 DIAGNOSIS — G51.0 BELL'S PALSY: ICD-10-CM

## 2018-06-07 DIAGNOSIS — T83.031A LEAKAGE OF INDWELLING URETHRAL CATHETER, INITIAL ENCOUNTER: ICD-10-CM

## 2018-06-07 DIAGNOSIS — F32.9 MAJOR DEPRESSIVE DISORDER, SINGLE EPISODE, UNSPECIFIED: ICD-10-CM

## 2018-06-07 DIAGNOSIS — B35.1 TINEA UNGUIUM: ICD-10-CM

## 2018-06-07 DIAGNOSIS — N32.89 OTHER SPECIFIED DISORDERS OF BLADDER: ICD-10-CM

## 2018-06-07 DIAGNOSIS — Z96.0 PRESENCE OF UROGENITAL IMPLANTS: ICD-10-CM

## 2018-06-07 DIAGNOSIS — E78.5 HYPERLIPIDEMIA, UNSPECIFIED: ICD-10-CM

## 2018-06-07 DIAGNOSIS — I10 ESSENTIAL (PRIMARY) HYPERTENSION: ICD-10-CM

## 2018-06-07 DIAGNOSIS — I69.291 DYSPHAGIA FOLLOWING OTHER NONTRAUMATIC INTRACRANIAL HEMORRHAGE: ICD-10-CM
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
## 2018-06-07 DIAGNOSIS — I69.222 DYSARTHRIA FOLLOWING OTHER NONTRAUMATIC INTRACRANIAL HEMORRHAGE: ICD-10-CM

## 2018-06-26 ENCOUNTER — INPATIENT (INPATIENT)
Facility: HOSPITAL | Age: 83
LOS: 7 days | Discharge: NURSING, REHAB OR LONG-TERM CARE | End: 2018-07-04
Attending: INTERNAL MEDICINE | Admitting: INTERNAL MEDICINE
Payer: MEDICARE

## 2018-06-26 VITALS
SYSTOLIC BLOOD PRESSURE: 106 MMHG | HEART RATE: 68 BPM | RESPIRATION RATE: 20 BRPM | TEMPERATURE: 97 F | DIASTOLIC BLOOD PRESSURE: 58 MMHG | OXYGEN SATURATION: 96 %

## 2018-06-26 NOTE — ED ADULT NURSE NOTE - OBJECTIVE STATEMENT
Sent from [facility] for possible hip fx. PT states she slid from the w/c yesterday trying to straighten up her room. C/o hip discomfort this AM, X-rays were done as per daughter and showed possible hip fx. C/o pain upon movement. Denies LOC.

## 2018-06-27 LAB
ALBUMIN SERPL ELPH-MCNC: 3.5 G/DL — SIGNIFICANT CHANGE UP (ref 3.5–5.2)
ALP SERPL-CCNC: 97 U/L — SIGNIFICANT CHANGE UP (ref 30–115)
ALT FLD-CCNC: 14 U/L — SIGNIFICANT CHANGE UP (ref 0–41)
ANION GAP SERPL CALC-SCNC: 13 MMOL/L — SIGNIFICANT CHANGE UP (ref 7–14)
ANION GAP SERPL CALC-SCNC: 13 MMOL/L — SIGNIFICANT CHANGE UP (ref 7–14)
ANISOCYTOSIS BLD QL: SLIGHT — SIGNIFICANT CHANGE UP
APTT BLD: 36.7 SEC — SIGNIFICANT CHANGE UP (ref 27–39.2)
AST SERPL-CCNC: 12 U/L — SIGNIFICANT CHANGE UP (ref 0–41)
BASOPHILS # BLD AUTO: 0 K/UL — SIGNIFICANT CHANGE UP (ref 0–0.2)
BASOPHILS # BLD AUTO: 0.01 K/UL — SIGNIFICANT CHANGE UP (ref 0–0.2)
BASOPHILS NFR BLD AUTO: 0 % — SIGNIFICANT CHANGE UP (ref 0–1)
BASOPHILS NFR BLD AUTO: 0.1 % — SIGNIFICANT CHANGE UP (ref 0–1)
BILIRUB SERPL-MCNC: 0.5 MG/DL — SIGNIFICANT CHANGE UP (ref 0.2–1.2)
BLD GP AB SCN SERPL QL: SIGNIFICANT CHANGE UP
BLD GP AB SCN SERPL QL: SIGNIFICANT CHANGE UP
BUN SERPL-MCNC: 17 MG/DL — SIGNIFICANT CHANGE UP (ref 10–20)
BUN SERPL-MCNC: 18 MG/DL — SIGNIFICANT CHANGE UP (ref 10–20)
CALCIUM SERPL-MCNC: 8.7 MG/DL — SIGNIFICANT CHANGE UP (ref 8.5–10.1)
CALCIUM SERPL-MCNC: 8.7 MG/DL — SIGNIFICANT CHANGE UP (ref 8.5–10.1)
CHLORIDE SERPL-SCNC: 103 MMOL/L — SIGNIFICANT CHANGE UP (ref 98–110)
CHLORIDE SERPL-SCNC: 103 MMOL/L — SIGNIFICANT CHANGE UP (ref 98–110)
CO2 SERPL-SCNC: 25 MMOL/L — SIGNIFICANT CHANGE UP (ref 17–32)
CO2 SERPL-SCNC: 25 MMOL/L — SIGNIFICANT CHANGE UP (ref 17–32)
CREAT SERPL-MCNC: 0.6 MG/DL — LOW (ref 0.7–1.5)
CREAT SERPL-MCNC: 0.7 MG/DL — SIGNIFICANT CHANGE UP (ref 0.7–1.5)
EOSINOPHIL # BLD AUTO: 0.15 K/UL — SIGNIFICANT CHANGE UP (ref 0–0.7)
EOSINOPHIL # BLD AUTO: 0.2 K/UL — SIGNIFICANT CHANGE UP (ref 0–0.7)
EOSINOPHIL NFR BLD AUTO: 1.7 % — SIGNIFICANT CHANGE UP (ref 0–8)
EOSINOPHIL NFR BLD AUTO: 2.4 % — SIGNIFICANT CHANGE UP (ref 0–8)
GIANT PLATELETS BLD QL SMEAR: PRESENT — SIGNIFICANT CHANGE UP
GLUCOSE SERPL-MCNC: 101 MG/DL — HIGH (ref 70–99)
GLUCOSE SERPL-MCNC: 111 MG/DL — HIGH (ref 70–99)
HCT VFR BLD CALC: 30.7 % — LOW (ref 37–47)
HCT VFR BLD CALC: 31.5 % — LOW (ref 37–47)
HGB BLD-MCNC: 10.1 G/DL — LOW (ref 12–16)
HGB BLD-MCNC: 10.4 G/DL — LOW (ref 12–16)
IMM GRANULOCYTES NFR BLD AUTO: 0.4 % — HIGH (ref 0.1–0.3)
INR BLD: 1.42 RATIO — HIGH (ref 0.65–1.3)
LYMPHOCYTES # BLD AUTO: 0.5 K/UL — LOW (ref 1.2–3.4)
LYMPHOCYTES # BLD AUTO: 1.04 K/UL — LOW (ref 1.2–3.4)
LYMPHOCYTES # BLD AUTO: 12.2 % — LOW (ref 20.5–51.1)
LYMPHOCYTES # BLD AUTO: 6 % — LOW (ref 20.5–51.1)
MANUAL SMEAR VERIFICATION: SIGNIFICANT CHANGE UP
MCHC RBC-ENTMCNC: 27 PG — SIGNIFICANT CHANGE UP (ref 27–31)
MCHC RBC-ENTMCNC: 27.1 PG — SIGNIFICANT CHANGE UP (ref 27–31)
MCHC RBC-ENTMCNC: 32.9 G/DL — SIGNIFICANT CHANGE UP (ref 32–37)
MCHC RBC-ENTMCNC: 33 G/DL — SIGNIFICANT CHANGE UP (ref 32–37)
MCV RBC AUTO: 81.8 FL — SIGNIFICANT CHANGE UP (ref 81–99)
MCV RBC AUTO: 82.3 FL — SIGNIFICANT CHANGE UP (ref 81–99)
MICROCYTES BLD QL: SLIGHT — SIGNIFICANT CHANGE UP
MONOCYTES # BLD AUTO: 0.5 K/UL — SIGNIFICANT CHANGE UP (ref 0.1–0.6)
MONOCYTES # BLD AUTO: 0.82 K/UL — HIGH (ref 0.1–0.6)
MONOCYTES NFR BLD AUTO: 6 % — SIGNIFICANT CHANGE UP (ref 1.7–9.3)
MONOCYTES NFR BLD AUTO: 9.6 % — HIGH (ref 1.7–9.3)
NEUTROPHILS # BLD AUTO: 6.55 K/UL — HIGH (ref 1.4–6.5)
NEUTROPHILS # BLD AUTO: 7.05 K/UL — HIGH (ref 1.4–6.5)
NEUTROPHILS NFR BLD AUTO: 76.5 % — HIGH (ref 42.2–75.2)
NEUTROPHILS NFR BLD AUTO: 85.1 % — HIGH (ref 42.2–75.2)
NRBC # BLD: 0 /100 WBCS — SIGNIFICANT CHANGE UP (ref 0–0)
NRBC # BLD: 0 /100 WBCS — SIGNIFICANT CHANGE UP (ref 0–0)
PLAT MORPH BLD: NORMAL — SIGNIFICANT CHANGE UP
PLATELET # BLD AUTO: 196 K/UL — SIGNIFICANT CHANGE UP (ref 130–400)
PLATELET # BLD AUTO: 220 K/UL — SIGNIFICANT CHANGE UP (ref 130–400)
POLYCHROMASIA BLD QL SMEAR: SIGNIFICANT CHANGE UP
POTASSIUM SERPL-MCNC: 4 MMOL/L — SIGNIFICANT CHANGE UP (ref 3.5–5)
POTASSIUM SERPL-MCNC: 4.4 MMOL/L — SIGNIFICANT CHANGE UP (ref 3.5–5)
POTASSIUM SERPL-SCNC: 4 MMOL/L — SIGNIFICANT CHANGE UP (ref 3.5–5)
POTASSIUM SERPL-SCNC: 4.4 MMOL/L — SIGNIFICANT CHANGE UP (ref 3.5–5)
PROT SERPL-MCNC: 6 G/DL — SIGNIFICANT CHANGE UP (ref 6–8)
PROTHROM AB SERPL-ACNC: 15.3 SEC — HIGH (ref 9.95–12.87)
RBC # BLD: 3.73 M/UL — LOW (ref 4.2–5.4)
RBC # BLD: 3.85 M/UL — LOW (ref 4.2–5.4)
RBC # FLD: 13.5 % — SIGNIFICANT CHANGE UP (ref 11.5–14.5)
RBC # FLD: 13.6 % — SIGNIFICANT CHANGE UP (ref 11.5–14.5)
RBC BLD AUTO: NORMAL — SIGNIFICANT CHANGE UP
SODIUM SERPL-SCNC: 141 MMOL/L — SIGNIFICANT CHANGE UP (ref 135–146)
SODIUM SERPL-SCNC: 141 MMOL/L — SIGNIFICANT CHANGE UP (ref 135–146)
TYPE + AB SCN PNL BLD: SIGNIFICANT CHANGE UP
TYPE + AB SCN PNL BLD: SIGNIFICANT CHANGE UP
WBC # BLD: 8.29 K/UL — SIGNIFICANT CHANGE UP (ref 4.8–10.8)
WBC # BLD: 8.43 K/UL — SIGNIFICANT CHANGE UP (ref 4.8–10.8)
WBC # FLD AUTO: 8.29 K/UL — SIGNIFICANT CHANGE UP (ref 4.8–10.8)
WBC # FLD AUTO: 8.43 K/UL — SIGNIFICANT CHANGE UP (ref 4.8–10.8)

## 2018-06-27 RX ORDER — SODIUM CHLORIDE 9 MG/ML
1000 INJECTION INTRAMUSCULAR; INTRAVENOUS; SUBCUTANEOUS
Qty: 0 | Refills: 0 | Status: DISCONTINUED | OUTPATIENT
Start: 2018-06-27 | End: 2018-06-27

## 2018-06-27 RX ORDER — SERTRALINE 25 MG/1
100 TABLET, FILM COATED ORAL DAILY
Qty: 0 | Refills: 0 | Status: DISCONTINUED | OUTPATIENT
Start: 2018-06-27 | End: 2018-06-28

## 2018-06-27 RX ORDER — ACETAMINOPHEN 500 MG
650 TABLET ORAL EVERY 6 HOURS
Qty: 0 | Refills: 0 | Status: DISCONTINUED | OUTPATIENT
Start: 2018-06-27 | End: 2018-06-28

## 2018-06-27 RX ORDER — NIFEDIPINE 30 MG
90 TABLET, EXTENDED RELEASE 24 HR ORAL DAILY
Qty: 0 | Refills: 0 | Status: DISCONTINUED | OUTPATIENT
Start: 2018-06-27 | End: 2018-06-28

## 2018-06-27 RX ORDER — ATORVASTATIN CALCIUM 80 MG/1
40 TABLET, FILM COATED ORAL AT BEDTIME
Qty: 0 | Refills: 0 | Status: DISCONTINUED | OUTPATIENT
Start: 2018-06-27 | End: 2018-06-28

## 2018-06-27 RX ORDER — OXYCODONE HYDROCHLORIDE 5 MG/1
5 TABLET ORAL EVERY 8 HOURS
Qty: 0 | Refills: 0 | Status: DISCONTINUED | OUTPATIENT
Start: 2018-06-27 | End: 2018-06-28

## 2018-06-27 RX ORDER — HEPARIN SODIUM 5000 [USP'U]/ML
5000 INJECTION INTRAVENOUS; SUBCUTANEOUS EVERY 8 HOURS
Qty: 0 | Refills: 0 | Status: DISCONTINUED | OUTPATIENT
Start: 2018-06-27 | End: 2018-06-28

## 2018-06-27 RX ORDER — DOCUSATE SODIUM 100 MG
100 CAPSULE ORAL DAILY
Qty: 0 | Refills: 0 | Status: DISCONTINUED | OUTPATIENT
Start: 2018-06-27 | End: 2018-06-28

## 2018-06-27 RX ORDER — TAMSULOSIN HYDROCHLORIDE 0.4 MG/1
0.4 CAPSULE ORAL AT BEDTIME
Qty: 0 | Refills: 0 | Status: DISCONTINUED | OUTPATIENT
Start: 2018-06-27 | End: 2018-06-28

## 2018-06-27 RX ORDER — SENNA PLUS 8.6 MG/1
2 TABLET ORAL AT BEDTIME
Qty: 0 | Refills: 0 | Status: DISCONTINUED | OUTPATIENT
Start: 2018-06-27 | End: 2018-06-28

## 2018-06-27 RX ORDER — LABETALOL HCL 100 MG
200 TABLET ORAL EVERY 8 HOURS
Qty: 0 | Refills: 0 | Status: DISCONTINUED | OUTPATIENT
Start: 2018-06-27 | End: 2018-06-28

## 2018-06-27 RX ADMIN — Medication 100 MILLIGRAM(S): at 13:04

## 2018-06-27 RX ADMIN — TAMSULOSIN HYDROCHLORIDE 0.4 MILLIGRAM(S): 0.4 CAPSULE ORAL at 21:54

## 2018-06-27 RX ADMIN — HEPARIN SODIUM 5000 UNIT(S): 5000 INJECTION INTRAVENOUS; SUBCUTANEOUS at 15:35

## 2018-06-27 RX ADMIN — HEPARIN SODIUM 5000 UNIT(S): 5000 INJECTION INTRAVENOUS; SUBCUTANEOUS at 21:54

## 2018-06-27 RX ADMIN — SERTRALINE 100 MILLIGRAM(S): 25 TABLET, FILM COATED ORAL at 13:04

## 2018-06-27 RX ADMIN — SENNA PLUS 2 TABLET(S): 8.6 TABLET ORAL at 21:54

## 2018-06-27 RX ADMIN — Medication 200 MILLIGRAM(S): at 15:36

## 2018-06-27 RX ADMIN — ATORVASTATIN CALCIUM 40 MILLIGRAM(S): 80 TABLET, FILM COATED ORAL at 21:54

## 2018-06-27 RX ADMIN — Medication 200 MILLIGRAM(S): at 08:41

## 2018-06-27 RX ADMIN — Medication 650 MILLIGRAM(S): at 11:47

## 2018-06-27 RX ADMIN — Medication 2.5 MILLIGRAM(S): at 08:40

## 2018-06-27 RX ADMIN — Medication 650 MILLIGRAM(S): at 13:04

## 2018-06-27 RX ADMIN — SODIUM CHLORIDE 50 MILLILITER(S): 9 INJECTION INTRAMUSCULAR; INTRAVENOUS; SUBCUTANEOUS at 08:50

## 2018-06-27 RX ADMIN — Medication 90 MILLIGRAM(S): at 08:40

## 2018-06-27 RX ADMIN — Medication 200 MILLIGRAM(S): at 21:54

## 2018-06-27 NOTE — ED PROVIDER NOTE - NS ED ROS FT
Constitutional: no fever, chills  Cardiac: No chest pain, SOB or edema.  Respiratory: No cough or respiratory distress  GI: No nausea, vomiting, diarrhea or abdominal pain.  : No dysuria, frequency, urgency or hematuria  MS: see HPI  Neuro: No headache or weakness.   Skin: No skin rash.  Except as documented in the HPI, all other systems are negative.

## 2018-06-27 NOTE — H&P ADULT - ATTENDING COMMENTS
Patient was seen and examined at the bed side. not in distress.   Agree with above note.  Long discussion with daughter and other family member. Family and patient was contemplating regarding making decision about the surgery.   Impacted Hip fracture. Still in the window of 48-72 hours. Appreciated Orthopedics. D/W orthopedic.   Patient is scheduled for surgery and will be benefitted form the surgery.   Patient previous history of SAH is more then 6 weeks ago, currently stable and s/p Physical therapy.   With History of COPD, patient is not smoking more than 6 months as per patient.     Knowing the risk of intubation with poor lung function, no cardiac history. no known CAD, no Chest pain, or arrythmia. Patient should be going for the surgery since she has had good functional status before the fracture even though she has a history of stroke.     Daughter in agreement but wanted to be cleared with Pulmonary (Dr. Whitt ) and anaesthesia.   She will remain in moderate to high risk for a intermediate risk procedure but stable to proceed for the surgery. Family / daughter in agreement knowing the risk.

## 2018-06-27 NOTE — H&P ADULT - NSHPPHYSICALEXAM_GEN_ALL_CORE
PHYSICAL EXAM:  GENERAL: NAD, no signs of respiratory distress  HEAD:  Atraumatic, Normocephalic  EYES: EOMI, PERRLA, conjunctiva and sclera clear  NECK: Supple, No JVD  CHEST/LUNG: Clear to auscultation bilaterally; No wheeze; No crackles; No accessory muscles used  HEART: Regular rate and rhythm; No murmurs;   ABDOMEN: Soft, Nontender, Nondistended; Bowel sounds present; No guarding  EXTREMITIES:  2+ Peripheral Pulses, No cyanosis or edema  PSYCH: AAOx3  NEUROLOGY: non-focal abnormalities   SKIN: No rashes or lesions

## 2018-06-27 NOTE — ED PROVIDER NOTE - PHYSICAL EXAMINATION
VITAL SIGNS: I have reviewed nursing notes and confirm.  CONSTITUTIONAL: Well-developed; well-nourished; in no acute distress.  SKIN: Skin exam is warm and dry, no acute rash, no ecchymosis to hip, pelvis  HEAD: Normocephalic; atraumatic.  EYES: PERRL, EOM intact; conjunctiva and sclera clear.  ENT: No nasal discharge; airway clear. TMs clear.  NECK: Supple; non tender.  CARD: Regular rate and rhythm.  RESP: No wheezes, rales or rhonchi.  ABD: Normal bowel sounds; soft; non-distended; non-tender  EXT: Normal ROM, non tender stable pelvis, NV intact  NEURO: Alert, oriented. Grossly unremarkable. No focal deficits.  PSYCH: Cooperative, appropriate.

## 2018-06-27 NOTE — CONSULT NOTE ADULT - SUBJECTIVE AND OBJECTIVE BOX
83 y/o F w/ right hip pain since yesterday. Patient had a fall while in her wheelchair. Denies pain anywhere else. Of note patient had a hemmorhagic stroke on 4/2018 for which she had r sided hemiparesis for which she is recovering and receiving intensive rehab at [facility]. Patient at this time is able to stand up with assistance but is unable to ambulate secondary to stroke. Daughter has been HCP.    Pmhx:  hemmorhagic stroke 4/2018 w/ r sided hemiparesis  HTN, HL  Allergies:        Allergies:  	Celebrex: Drug, Other (Moderate), Originally Entered as [MO RASH] reaction to [Celebrex]  	sulfonamides: Drug Category, Unknown, Originally Entered as [Unknown] reaction to [Sulfa (Sulfonamide Antibiotics)]    Home Medications:   * Patient Currently Takes Medications as of 23-May-2018 10:01 documented in Structured Notes  · 	ocular lubricant ophthalmic solution: 1 drop(s) to right eye every 6 hours while awake  · 	senna oral tablet: 2 tab(s) orally once a day (at bedtime)  · 	polyethylene glycol 3350 oral powder for reconstitution: 17 gram(s) orally every 12 hours  · 	docusate sodium 100 mg oral capsule: 1 cap(s) orally once a day  · 	NIFEdipine 90 mg oral tablet, extended release: 1 tab(s) orally once a day  · 	labetalol 200 mg oral tablet: 1 tab(s) orally every 8 hours  · 	atorvastatin 40 mg oral tablet: 1 tab(s) orally once a day (at bedtime)  · 	sertraline 100 mg oral tablet: 1 tab(s) orally once a day  · 	heparin 5000 units/mL injectable solution: 5000 unit(s) injectable every 8 hours  · 	tamsulosin 0.4 mg oral capsule: 2 cap(s) orally once a day (at bedtime)  · 	enalapril 2.5 mg oral tablet: 1 tab(s) orally once a day at noon      PE:  NAD  non labored respirations  RLE:  no breaks in skin  painful to movement at r hip  able to flex/extend at ankle  SILT sap/sp/dp/tib/krista distribution  calve soft/nttp  dp pulse palpable, cap refill wnl    CT scan: right hip impacted femoral neck fracture    A/P: 83 y/o F w/ right hip impacted femoral neck fracture  - NPO  - medical optimization/clearance note prior to surgery  - NWB RLE, bedrest  - plan for R hip CRPP vs hemiarthroplasty vs. total hip arthroplasty when medically cleared  - dvt ppx, do not hold for surgery  - pain control  - pre op labs, type and screen x 2

## 2018-06-27 NOTE — H&P ADULT - NSHPLABSRESULTS_GEN_ALL_CORE
10.4   8.43  )-----------( 220      ( 26 Jun 2018 23:32 )             31.5     06-26    141  |  103  |  18  ----------------------------<  111<H>  4.0   |  25  |  0.7    Ca    8.7      26 Jun 2018 23:32    TPro  6.0  /  Alb  3.5  /  TBili  0.5  /  DBili  x   /  AST  12  /  ALT  14  /  AlkPhos  97  06-26      PT/INR - ( 26 Jun 2018 23:32 )   PT: 15.30 sec;   INR: 1.42 ratio         PTT - ( 26 Jun 2018 23:32 )  PTT:36.7 sec    CT ABD/Pelvis       IMPRESSION:        Limited evaluation of solid organs and vascular structures without   intravenous contrast.    Acute impacted right femoral neck fracture.    Small bilateral pleural effusions, with adjacent compressive atelectasis.    High stool load within the rectum.

## 2018-06-27 NOTE — H&P ADULT - ASSESSMENT
83 yo F with Past Medical history of HTN, DLD, recent hemorrhagic stroke, initially with R hemiparesis, now largely resolved after intensive therapy, can stand, uses wheelchair for travel, presents to ED for assessment of Right hip pain s/p fall yesterday while she was reaching out to get something while seated in her wheelchair. Initially patient was pain free then had excruciating pain and was unable to stand or attempt ambulation. Patient denies any other associated symptoms.     1) Right Femoral Neck Hip Fracture   Follow with Orthopedic surgery on recommendations and plan for procedure   Monitor Hemoglobin this am   Pain medication as needed only.   Medical Clearance requested by Ortho     2) Right Hemiparesis secondary to Hemorrhagic stroke in April   stable and much improved prior to yesterdays event   Follow with physical therapy recommendations post Hip Repair.     3) Chronic Cleveland for Retention   Patient was going to see Dr. Kirk from Urology yesterday prior to her falling and was unable to do so.   Dr. Kirk wanted to perform a trial of void in order to attempt removal of the Cleveland     4) DVT Prophylaxis   Heparin Sub q     5) GI Prophylaxis   Protonix   Full Code 83 yo F with Past Medical history of HTN, DLD, recent hemorrhagic stroke, initially with R hemiparesis, now largely resolved after intensive therapy, can stand, uses wheelchair for travel, presents to ED for assessment of Right hip pain s/p fall yesterday while she was reaching out to get something while seated in her wheelchair. Initially patient was pain free then had excruciating pain and was unable to stand or attempt ambulation. Patient denies any other associated symptoms.     1) Right Femoral Neck Hip Fracture   Follow with Orthopedic surgery on recommendations and plan for procedure   Monitor Hemoglobin this am   Pain medication as needed only.   Medical Clearance requested by Ortho   NPO for possible procedure, follow with ortho if no procedure place patient on diet.     2) Right Hemiparesis secondary to Hemorrhagic stroke in April   stable and much improved prior to yesterdays event   Follow with physical therapy recommendations post Hip Repair.     3) Chronic Cleveland for Retention   Patient was going to see Dr. Kirk from Urology yesterday prior to her falling and was unable to do so.   Dr. Kirk wanted to perform a trial of void in order to attempt removal of the Cleveland     4) DVT Prophylaxis   Heparin Sub q     5) GI Prophylaxis   Protonix   Full Code

## 2018-06-27 NOTE — H&P ADULT - NSHPOUTPATIENTPROVIDERS_GEN_ALL_CORE
PCP: Dr. Cameron   Neurology: Dr. Regan   Pharmacy: [facility]

## 2018-06-27 NOTE — H&P ADULT - HISTORY OF PRESENT ILLNESS
83 yo F with Past Medical history of HTN, DLD, recent hemorrhagic stroke, initially with R hemiparesis, now largely resolved after intensive therapy, can stand, uses wheelchair for travel, presents to ED for assessment of Right hip pain s/p fall yesterday while she was reaching out to get something while seated in her wheelchair. Initially patient was pain free then had excruciating pain and was unable to stand or attempt ambulation. Patient denies any other associated symptoms.       PCP: Dr. Cameron   Neurology: Dr. Regan   Pharmacy: [facility]

## 2018-06-27 NOTE — CHART NOTE - NSCHARTNOTEFT_GEN_A_CORE
Mrs Vera is an  81 yo F with Past Medical history of HTN, DLD, COPD not on home O2, recent hemorrhagic stroke, initially with R hemiparesis, now largely resolved after intensive therapy is admitted currently for  Right Femoral Neck Hip Fracture after mechanical fall.  patient was add on for possible surgery today pending medical clearance.  But patient saturation dropped overnight and she is currently on O2    PE  ICU Vital Signs Last 24 Hrs  T(C): 37.1 (27 Jun 2018 07:30), Max: 37.4 (27 Jun 2018 04:15)  T(F): 98.7 (27 Jun 2018 07:30), Max: 99.4 (27 Jun 2018 04:15)  HR: 90 (27 Jun 2018 08:45) (68 - 98)  BP: 186/83 (27 Jun 2018 08:45) (106/58 - 186/83)  SpO2: 96% (27 Jun 2018 08:45) (93% - 96%)    patient was sating 93% on 2L NC, off O2 Oxygen dropped to 87 so I put the O2 back  lungs: clear on anterior auscultation no wheezing    she denies cough or SOB  CXR reviewed --> Mildleft basilar opacity/atelectasis    After discussing the case with the patient and her daughters, they don't feel comfortable going for surgery today    Assessment:  Right Femoral Neck Hip Fracture   COPD currently O2 dependant    Plan  -EKG for clearance (none in the chart)  -Keep O2 to taget saturation 88-92  -incentive spirometer  -pulmonary cs /dr Whitt for preop assessment  -Ortho resident informed, surgery cancelled, Diet ordered, IV fluids stopped

## 2018-06-27 NOTE — ED PROVIDER NOTE - OBJECTIVE STATEMENT
83 yo F with history of recent hemorrhagic stroke, initially with R hemiparesis, now largely resolved after intensive therapy, can stand, uses wheelchair for travel, also has hx of HTN, HLD, here for assessment of R hip pain sp fall yesterday.    Patient fell while leaning to pick something up from wheelchair, landed directly on R hip, no head trauma. Was helped into wheelchair, initially was pain free. THis AM when woke up and stood to transfer from bed to chair had pain.     No other complaints.

## 2018-06-28 LAB
ANION GAP SERPL CALC-SCNC: 17 MMOL/L — HIGH (ref 7–14)
APTT BLD: 37.1 SEC — SIGNIFICANT CHANGE UP (ref 27–39.2)
BASOPHILS # BLD AUTO: 0.01 K/UL — SIGNIFICANT CHANGE UP (ref 0–0.2)
BASOPHILS NFR BLD AUTO: 0.1 % — SIGNIFICANT CHANGE UP (ref 0–1)
BUN SERPL-MCNC: 15 MG/DL — SIGNIFICANT CHANGE UP (ref 10–20)
CALCIUM SERPL-MCNC: 8.9 MG/DL — SIGNIFICANT CHANGE UP (ref 8.5–10.1)
CHLORIDE SERPL-SCNC: 101 MMOL/L — SIGNIFICANT CHANGE UP (ref 98–110)
CO2 SERPL-SCNC: 23 MMOL/L — SIGNIFICANT CHANGE UP (ref 17–32)
CREAT SERPL-MCNC: 0.5 MG/DL — LOW (ref 0.7–1.5)
EOSINOPHIL # BLD AUTO: 3.39 K/UL — HIGH (ref 0–0.7)
EOSINOPHIL NFR BLD AUTO: 42.5 % — HIGH (ref 0–8)
GLUCOSE SERPL-MCNC: 100 MG/DL — HIGH (ref 70–99)
HCT VFR BLD CALC: 33.3 % — LOW (ref 37–47)
HGB BLD-MCNC: 10.8 G/DL — LOW (ref 12–16)
IMM GRANULOCYTES NFR BLD AUTO: 0.8 % — HIGH (ref 0.1–0.3)
INR BLD: 1.17 RATIO — SIGNIFICANT CHANGE UP (ref 0.65–1.3)
LYMPHOCYTES # BLD AUTO: 0.61 K/UL — LOW (ref 1.2–3.4)
LYMPHOCYTES # BLD AUTO: 7.7 % — LOW (ref 20.5–51.1)
MAGNESIUM SERPL-MCNC: 1.9 MG/DL — SIGNIFICANT CHANGE UP (ref 1.8–2.4)
MCHC RBC-ENTMCNC: 26.7 PG — LOW (ref 27–31)
MCHC RBC-ENTMCNC: 32.4 G/DL — SIGNIFICANT CHANGE UP (ref 32–37)
MCV RBC AUTO: 82.4 FL — SIGNIFICANT CHANGE UP (ref 81–99)
MONOCYTES # BLD AUTO: 0.55 K/UL — SIGNIFICANT CHANGE UP (ref 0.1–0.6)
MONOCYTES NFR BLD AUTO: 6.9 % — SIGNIFICANT CHANGE UP (ref 1.7–9.3)
NEUTROPHILS # BLD AUTO: 3.35 K/UL — SIGNIFICANT CHANGE UP (ref 1.4–6.5)
NEUTROPHILS NFR BLD AUTO: 42 % — LOW (ref 42.2–75.2)
PLATELET # BLD AUTO: 216 K/UL — SIGNIFICANT CHANGE UP (ref 130–400)
POTASSIUM SERPL-MCNC: 4.3 MMOL/L — SIGNIFICANT CHANGE UP (ref 3.5–5)
POTASSIUM SERPL-SCNC: 4.3 MMOL/L — SIGNIFICANT CHANGE UP (ref 3.5–5)
PROTHROM AB SERPL-ACNC: 12.6 SEC — SIGNIFICANT CHANGE UP (ref 9.95–12.87)
RBC # BLD: 4.04 M/UL — LOW (ref 4.2–5.4)
RBC # FLD: 13.6 % — SIGNIFICANT CHANGE UP (ref 11.5–14.5)
SODIUM SERPL-SCNC: 141 MMOL/L — SIGNIFICANT CHANGE UP (ref 135–146)
WBC # BLD: 7.97 K/UL — SIGNIFICANT CHANGE UP (ref 4.8–10.8)
WBC # FLD AUTO: 7.97 K/UL — SIGNIFICANT CHANGE UP (ref 4.8–10.8)

## 2018-06-28 RX ORDER — SERTRALINE 25 MG/1
100 TABLET, FILM COATED ORAL DAILY
Qty: 0 | Refills: 0 | Status: DISCONTINUED | OUTPATIENT
Start: 2018-06-28 | End: 2018-07-04

## 2018-06-28 RX ORDER — OXYCODONE HYDROCHLORIDE 5 MG/1
5 TABLET ORAL EVERY 4 HOURS
Qty: 0 | Refills: 0 | Status: DISCONTINUED | OUTPATIENT
Start: 2018-06-28 | End: 2018-07-04

## 2018-06-28 RX ORDER — ACETAMINOPHEN 500 MG
650 TABLET ORAL EVERY 6 HOURS
Qty: 0 | Refills: 0 | Status: DISCONTINUED | OUTPATIENT
Start: 2018-06-28 | End: 2018-07-04

## 2018-06-28 RX ORDER — ACETAMINOPHEN 500 MG
1000 TABLET ORAL ONCE
Qty: 0 | Refills: 0 | Status: COMPLETED | OUTPATIENT
Start: 2018-06-28 | End: 2018-06-28

## 2018-06-28 RX ORDER — DOCUSATE SODIUM 100 MG
100 CAPSULE ORAL THREE TIMES A DAY
Qty: 0 | Refills: 0 | Status: DISCONTINUED | OUTPATIENT
Start: 2018-06-28 | End: 2018-07-04

## 2018-06-28 RX ORDER — SODIUM CHLORIDE 9 MG/ML
1000 INJECTION, SOLUTION INTRAVENOUS
Qty: 0 | Refills: 0 | Status: DISCONTINUED | OUTPATIENT
Start: 2018-06-28 | End: 2018-06-29

## 2018-06-28 RX ORDER — MORPHINE SULFATE 50 MG/1
2 CAPSULE, EXTENDED RELEASE ORAL EVERY 4 HOURS
Qty: 0 | Refills: 0 | Status: DISCONTINUED | OUTPATIENT
Start: 2018-06-28 | End: 2018-07-04

## 2018-06-28 RX ORDER — HYDROMORPHONE HYDROCHLORIDE 2 MG/ML
0.5 INJECTION INTRAMUSCULAR; INTRAVENOUS; SUBCUTANEOUS
Qty: 0 | Refills: 0 | Status: DISCONTINUED | OUTPATIENT
Start: 2018-06-28 | End: 2018-06-29

## 2018-06-28 RX ORDER — NIFEDIPINE 30 MG
90 TABLET, EXTENDED RELEASE 24 HR ORAL DAILY
Qty: 0 | Refills: 0 | Status: DISCONTINUED | OUTPATIENT
Start: 2018-06-28 | End: 2018-07-04

## 2018-06-28 RX ORDER — HEPARIN SODIUM 5000 [USP'U]/ML
5000 INJECTION INTRAVENOUS; SUBCUTANEOUS EVERY 8 HOURS
Qty: 0 | Refills: 0 | Status: DISCONTINUED | OUTPATIENT
Start: 2018-06-28 | End: 2018-07-04

## 2018-06-28 RX ORDER — LABETALOL HCL 100 MG
200 TABLET ORAL EVERY 8 HOURS
Qty: 0 | Refills: 0 | Status: DISCONTINUED | OUTPATIENT
Start: 2018-06-28 | End: 2018-07-04

## 2018-06-28 RX ORDER — OXYCODONE AND ACETAMINOPHEN 5; 325 MG/1; MG/1
1 TABLET ORAL ONCE
Qty: 0 | Refills: 0 | Status: DISCONTINUED | OUTPATIENT
Start: 2018-06-28 | End: 2018-06-29

## 2018-06-28 RX ORDER — ATORVASTATIN CALCIUM 80 MG/1
40 TABLET, FILM COATED ORAL AT BEDTIME
Qty: 0 | Refills: 0 | Status: DISCONTINUED | OUTPATIENT
Start: 2018-06-28 | End: 2018-07-04

## 2018-06-28 RX ORDER — CEFAZOLIN SODIUM 1 G
1000 VIAL (EA) INJECTION EVERY 8 HOURS
Qty: 0 | Refills: 0 | Status: COMPLETED | OUTPATIENT
Start: 2018-06-29 | End: 2018-06-29

## 2018-06-28 RX ORDER — SENNA PLUS 8.6 MG/1
2 TABLET ORAL AT BEDTIME
Qty: 0 | Refills: 0 | Status: DISCONTINUED | OUTPATIENT
Start: 2018-06-28 | End: 2018-07-04

## 2018-06-28 RX ORDER — ONDANSETRON 8 MG/1
4 TABLET, FILM COATED ORAL ONCE
Qty: 0 | Refills: 0 | Status: DISCONTINUED | OUTPATIENT
Start: 2018-06-28 | End: 2018-06-29

## 2018-06-28 RX ORDER — OXYCODONE HYDROCHLORIDE 5 MG/1
10 TABLET ORAL EVERY 4 HOURS
Qty: 0 | Refills: 0 | Status: DISCONTINUED | OUTPATIENT
Start: 2018-06-28 | End: 2018-07-04

## 2018-06-28 RX ORDER — TAMSULOSIN HYDROCHLORIDE 0.4 MG/1
0.4 CAPSULE ORAL AT BEDTIME
Qty: 0 | Refills: 0 | Status: DISCONTINUED | OUTPATIENT
Start: 2018-06-28 | End: 2018-07-04

## 2018-06-28 RX ADMIN — Medication 400 MILLIGRAM(S): at 20:35

## 2018-06-28 RX ADMIN — SODIUM CHLORIDE 75 MILLILITER(S): 9 INJECTION, SOLUTION INTRAVENOUS at 19:39

## 2018-06-28 RX ADMIN — Medication 200 MILLIGRAM(S): at 22:22

## 2018-06-28 RX ADMIN — SENNA PLUS 2 TABLET(S): 8.6 TABLET ORAL at 22:21

## 2018-06-28 RX ADMIN — ATORVASTATIN CALCIUM 40 MILLIGRAM(S): 80 TABLET, FILM COATED ORAL at 22:19

## 2018-06-28 RX ADMIN — HEPARIN SODIUM 5000 UNIT(S): 5000 INJECTION INTRAVENOUS; SUBCUTANEOUS at 05:13

## 2018-06-28 RX ADMIN — Medication 100 MILLIGRAM(S): at 12:08

## 2018-06-28 RX ADMIN — SERTRALINE 100 MILLIGRAM(S): 25 TABLET, FILM COATED ORAL at 12:08

## 2018-06-28 RX ADMIN — Medication 90 MILLIGRAM(S): at 05:13

## 2018-06-28 RX ADMIN — Medication 2.5 MILLIGRAM(S): at 05:13

## 2018-06-28 RX ADMIN — Medication 200 MILLIGRAM(S): at 13:47

## 2018-06-28 RX ADMIN — Medication 200 MILLIGRAM(S): at 05:13

## 2018-06-28 RX ADMIN — HEPARIN SODIUM 5000 UNIT(S): 5000 INJECTION INTRAVENOUS; SUBCUTANEOUS at 22:36

## 2018-06-28 RX ADMIN — TAMSULOSIN HYDROCHLORIDE 0.4 MILLIGRAM(S): 0.4 CAPSULE ORAL at 22:21

## 2018-06-28 NOTE — BRIEF OPERATIVE NOTE - PROCEDURE
<<-----Click on this checkbox to enter Procedure Closed reduction and percutaneous pinning (CRPP) of right hip  06/28/2018    Active  VIANEY

## 2018-06-28 NOTE — PROGRESS NOTE ADULT - SUBJECTIVE AND OBJECTIVE BOX
CHIEF COMPLAINT:   Patient is a 82y old  Female who presents with a chief complaint of Right Hip Pain (27 Jun 2018 05:50) fount to have right hip impacted neck fracture.       HISTORY OF PRESENTING ILLNESS:   83 yo F with Past Medical history of HTN, DLD, recent hemorrhagic stroke, initially with R hemiparesis, now largely resolved after intensive therapy, can stand, uses wheelchair for travel, presents to ED for assessment of Right hip pain s/p fall yesterday while she was reaching out to get something while seated in her wheelchair. Initially patient was pain free then had excruciating pain and was unable to stand or attempt ambulation. Patient denies any other associated symptoms.    VITALS:   T(F): 98.4  HR: 77  BP: 124/58  RR: 18  SpO2: 93%    LABS:                         10.8   7.97  )-----------( 216      ( 28 Jun 2018 04:59 )             33.3       RADIOLOGY:  right sided hip neck fracture    PHYSICAL EXAM:  GEN: No acute distress  HEENT:  WNL  LUNGS/ PULM: Clear to auscultation bilaterally   HEART/ CVS : S1/S2 present. RRR.   ABD: Soft, non-tender, non-distended. Bowel sounds present  EXT: LIMITED RIGHT LOWER EXTREMITY ROM  NEURO: AAOX3    ASSESSMENT & PLAN

## 2018-06-28 NOTE — PROGRESS NOTE ADULT - ASSESSMENT
Patient is a 82y old Female who presents with a chief complaint of Right Hip Pain (27 Jun 2018 05:50)    Currently admitted to medicine with the primary diagnosis of Femoral neck fracture    1) Right Femoral Neck Hip Fracture     patient scheduled for the procedure for today    2) Right Hemiparesis secondary to Hemorrhagic stroke >> stable    4) DVT Prophylaxis     Heparin Sub q stopped per Ortho for the procedure  STAT INR: 1.17    5/ COPD - stable.     5) GI Prophylaxis       Full Code

## 2018-06-28 NOTE — CHART NOTE - NSCHARTNOTEFT_GEN_A_CORE
I spoke with Dr GEENA Vidal yesterday afternoon and ABHINAV Back this am.    Re: Mrs Jody Mayfield    She is not a patient of mine. I have never seen her as a patient in the past and I do not have any PFTs as a reference regarding her Hx of COPD.    Based on her age and medical hx she has an ABOVE average risk for GA.    I recommended as it would be prudent to continue with standard therapy and perform the surgical procedure via local/spinal rather than GA.    I will contact the patients daughter as inform her of the above.    Any questions, please feel free to contact me.    Thank you.

## 2018-06-28 NOTE — PRE-ANESTHESIA EVALUATION ADULT - NSANTHADDINFOFT_GEN_ALL_CORE
Spoke to family extensively re Spinal vs GETA possible post op ventilation  Have reviewed dr Whitt evaluation have attempted to reach to discuss hoe we can proceed if !0 Spinal unable to place  2) if not tolerating spinal during surgery we may have to concert to GETA.  Family is discussing how they wish to proceed given above options Spoke to family extensively re Spinal vs GETA possible post op ventilation  Have reviewed dr Whitt evaluation have attempted to reach to discuss hoe we can proceed if !0 Spinal unable to place  2) if not tolerating spinal during surgery we may have to concert to GETA.  Family is discussing how they wish to proceed given above options  Spoke to Dr Whitt will proceed if unable to get spinal we are to abort procedure and due conservative tx .  If proceed with spinal does not tolerate will change to GETA.

## 2018-06-28 NOTE — PROGRESS NOTE ADULT - ASSESSMENT
1) Right Femoral Neck Hip Fracture   Stable to go for the procedure.   D/W Pulmonary and also orthopedics.   Patient is on schedule for the procedure today.   D/W Daughter and explained     2) Right Hemiparesis secondary to Hemorrhagic stroke in April   stable and much improved prior to yesterdays event . not an active issue now      3) Chronic Cleveland for Retention   Patient was going to see Dr. Kirk from Urology yesterday prior to her falling and was unable to do so.   Dr. Kirk wanted to perform a trial of void in order to attempt removal of the [place]     4) DVT Prophylaxis   Heparin Sub q     5/ COPD - stable. Bronchodilator as she is getting now.     With long history of smoking and COPD it is recommended to plan  for the procedure with spinal if possible.     5) GI Prophylaxis   Protonix   Full Code

## 2018-06-28 NOTE — PROGRESS NOTE ADULT - SUBJECTIVE AND OBJECTIVE BOX
SUBJECTIVE:    Patient is a 82y old Female who presents with a chief complaint of Right Hip Pain (27 Jun 2018 05:50)    Currently admitted to medicine with the primary diagnosis of Femoral neck fracture       PAST MEDICAL & SURGICAL HISTORY  HLD (hyperlipidemia)  No significant past surgical history    SOCIAL HISTORY:  Negative for smoking/alcohol/drug use.     ALLERGIES:  Celebrex (Other (Moderate))  sulfonamides (Unknown)    MEDICATIONS:  STANDING MEDICATIONS  atorvastatin 40 milliGRAM(s) Oral at bedtime  docusate sodium 100 milliGRAM(s) Oral daily  enalapril 2.5 milliGRAM(s) Oral daily  labetalol 200 milliGRAM(s) Oral every 8 hours  NIFEdipine XL 90 milliGRAM(s) Oral daily  senna 2 Tablet(s) Oral at bedtime  sertraline 100 milliGRAM(s) Oral daily  tamsulosin 0.4 milliGRAM(s) Oral at bedtime    PRN MEDICATIONS  acetaminophen   Tablet. 650 milliGRAM(s) Oral every 6 hours PRN  oxyCODONE    IR 5 milliGRAM(s) Oral every 8 hours PRN    VITALS:   T(F): 98.4  HR: 77  BP: 124/58  RR: 18  SpO2: 94%    LABS:                        10.8   7.97  )-----------( 216      ( 28 Jun 2018 04:59 )             33.3     06-28    141  |  101  |  15  ----------------------------<  100<H>  4.3   |  23  |  0.5<L>    Ca    8.9      28 Jun 2018 04:59  Mg     1.9     06-28    TPro  6.0  /  Alb  3.5  /  TBili  0.5  /  DBili  x   /  AST  12  /  ALT  14  /  AlkPhos  97  06-26    PT/INR - ( 28 Jun 2018 12:06 )   PT: 12.60 sec;   INR: 1.17 ratio         PTT - ( 28 Jun 2018 12:06 )  PTT:37.1 sec              RADIOLOGY:    < from: CT Abdomen and Pelvis No Cont (06.27.18 @ 01:59) >  IMPRESSION:        Limited evaluation of solid organs and vascular structures without   intravenous contrast.    Acute impacted right femoral neck fracture.    Small bilateral pleural effusions, with adjacent compressive atelectasis.      < end of copied text >      PHYSICAL EXAM:    GEN: No acute distress  LUNGS: Clear to auscultation bilaterally   HEART: S1/S2 present.   ABD: Soft, non-tender, non-distended.   EXT: limited lower rt limb ROM  NEURO: AAOX3

## 2018-06-28 NOTE — PROGRESS NOTE ADULT - SUBJECTIVE AND OBJECTIVE BOX
ORTHO POC    S: Pt seen and examined, resting comfortably in bed. Daughter at bedside. Denies cp/sob/f/c.    PE:  NAD  non labored respirations  RLE:  DRESSING C/D/I  calves soft/nttp  nvid    A/P: 83 y/o F s/p r hip CRPP. POD 0  - WBAT RLE  - PT/REHAB  - oob to chair, IS  - dvt ppx  - pain control  - post op abx

## 2018-06-28 NOTE — PACU DISCHARGE NOTE - COMMENTS
Uneventful intraoperative course. Patient stable upon arrival to PACU. Report given to RN. VSS: 115/56, HR 89, RR 14, Temp 98, O2 saturation 92%

## 2018-06-29 LAB
ANION GAP SERPL CALC-SCNC: 17 MMOL/L — HIGH (ref 7–14)
BASOPHILS # BLD AUTO: 0 K/UL — SIGNIFICANT CHANGE UP (ref 0–0.2)
BASOPHILS NFR BLD AUTO: 0 % — SIGNIFICANT CHANGE UP (ref 0–1)
BUN SERPL-MCNC: 24 MG/DL — HIGH (ref 10–20)
CALCIUM SERPL-MCNC: 8.8 MG/DL — SIGNIFICANT CHANGE UP (ref 8.5–10.1)
CHLORIDE SERPL-SCNC: 102 MMOL/L — SIGNIFICANT CHANGE UP (ref 98–110)
CO2 SERPL-SCNC: 25 MMOL/L — SIGNIFICANT CHANGE UP (ref 17–32)
CREAT SERPL-MCNC: 0.6 MG/DL — LOW (ref 0.7–1.5)
EOSINOPHIL # BLD AUTO: 0.58 K/UL — SIGNIFICANT CHANGE UP (ref 0–0.7)
EOSINOPHIL NFR BLD AUTO: 7.8 % — SIGNIFICANT CHANGE UP (ref 0–8)
GIANT PLATELETS BLD QL SMEAR: PRESENT — SIGNIFICANT CHANGE UP
GLUCOSE SERPL-MCNC: 57 MG/DL — LOW (ref 70–99)
HCT VFR BLD CALC: 31.1 % — LOW (ref 37–47)
HGB BLD-MCNC: 10 G/DL — LOW (ref 12–16)
LYMPHOCYTES # BLD AUTO: 0.33 K/UL — LOW (ref 1.2–3.4)
LYMPHOCYTES # BLD AUTO: 4.4 % — LOW (ref 20.5–51.1)
MANUAL SMEAR VERIFICATION: SIGNIFICANT CHANGE UP
MCHC RBC-ENTMCNC: 26.9 PG — LOW (ref 27–31)
MCHC RBC-ENTMCNC: 32.2 G/DL — SIGNIFICANT CHANGE UP (ref 32–37)
MCV RBC AUTO: 83.6 FL — SIGNIFICANT CHANGE UP (ref 81–99)
MONOCYTES # BLD AUTO: 0.45 K/UL — SIGNIFICANT CHANGE UP (ref 0.1–0.6)
MONOCYTES NFR BLD AUTO: 6.1 % — SIGNIFICANT CHANGE UP (ref 1.7–9.3)
NEUTROPHILS # BLD AUTO: 6.06 K/UL — SIGNIFICANT CHANGE UP (ref 1.4–6.5)
NEUTROPHILS NFR BLD AUTO: 81.7 % — HIGH (ref 42.2–75.2)
NRBC # BLD: 0 /100 WBCS — SIGNIFICANT CHANGE UP (ref 0–0)
PLAT MORPH BLD: NORMAL — SIGNIFICANT CHANGE UP
PLATELET # BLD AUTO: 228 K/UL — SIGNIFICANT CHANGE UP (ref 130–400)
POTASSIUM SERPL-MCNC: 4.5 MMOL/L — SIGNIFICANT CHANGE UP (ref 3.5–5)
POTASSIUM SERPL-SCNC: 4.5 MMOL/L — SIGNIFICANT CHANGE UP (ref 3.5–5)
RBC # BLD: 3.72 M/UL — LOW (ref 4.2–5.4)
RBC # FLD: 13.5 % — SIGNIFICANT CHANGE UP (ref 11.5–14.5)
RBC BLD AUTO: NORMAL — SIGNIFICANT CHANGE UP
SODIUM SERPL-SCNC: 144 MMOL/L — SIGNIFICANT CHANGE UP (ref 135–146)
WBC # BLD: 7.42 K/UL — SIGNIFICANT CHANGE UP (ref 4.8–10.8)
WBC # FLD AUTO: 7.42 K/UL — SIGNIFICANT CHANGE UP (ref 4.8–10.8)

## 2018-06-29 RX ORDER — SODIUM CHLORIDE 9 MG/ML
1000 INJECTION, SOLUTION INTRAVENOUS
Qty: 0 | Refills: 0 | Status: DISCONTINUED | OUTPATIENT
Start: 2018-06-29 | End: 2018-07-02

## 2018-06-29 RX ADMIN — HEPARIN SODIUM 5000 UNIT(S): 5000 INJECTION INTRAVENOUS; SUBCUTANEOUS at 13:57

## 2018-06-29 RX ADMIN — Medication 100 MILLIGRAM(S): at 11:22

## 2018-06-29 RX ADMIN — ATORVASTATIN CALCIUM 40 MILLIGRAM(S): 80 TABLET, FILM COATED ORAL at 21:11

## 2018-06-29 RX ADMIN — Medication 100 MILLIGRAM(S): at 17:19

## 2018-06-29 RX ADMIN — Medication 100 MILLIGRAM(S): at 21:12

## 2018-06-29 RX ADMIN — Medication 2.5 MILLIGRAM(S): at 06:38

## 2018-06-29 RX ADMIN — Medication 100 MILLIGRAM(S): at 00:55

## 2018-06-29 RX ADMIN — TAMSULOSIN HYDROCHLORIDE 0.4 MILLIGRAM(S): 0.4 CAPSULE ORAL at 21:12

## 2018-06-29 RX ADMIN — Medication 200 MILLIGRAM(S): at 13:57

## 2018-06-29 RX ADMIN — Medication 200 MILLIGRAM(S): at 21:11

## 2018-06-29 RX ADMIN — SENNA PLUS 2 TABLET(S): 8.6 TABLET ORAL at 21:12

## 2018-06-29 RX ADMIN — Medication 100 MILLIGRAM(S): at 06:38

## 2018-06-29 RX ADMIN — HEPARIN SODIUM 5000 UNIT(S): 5000 INJECTION INTRAVENOUS; SUBCUTANEOUS at 06:38

## 2018-06-29 RX ADMIN — Medication 200 MILLIGRAM(S): at 06:38

## 2018-06-29 RX ADMIN — Medication 90 MILLIGRAM(S): at 06:38

## 2018-06-29 RX ADMIN — SODIUM CHLORIDE 75 MILLILITER(S): 9 INJECTION, SOLUTION INTRAVENOUS at 23:24

## 2018-06-29 RX ADMIN — HEPARIN SODIUM 5000 UNIT(S): 5000 INJECTION INTRAVENOUS; SUBCUTANEOUS at 21:11

## 2018-06-29 RX ADMIN — SERTRALINE 100 MILLIGRAM(S): 25 TABLET, FILM COATED ORAL at 11:22

## 2018-06-29 NOTE — CONSULT NOTE ADULT - ASSESSMENT
IMPRESSION: Rehab of R hip Fx SP CRPP/ Recent ICH    PRECAUTIONS: [    ] Cardiac  [    ] Respiratory  [    ] Seizures [    ] Contact Isolation  [    ] Droplet Isolation  [    ] Other    Weight Bearing Status: WBAT    RECOMMENDATION:    Out of Bed to Chair     DVT/Decubiti Prophylaxis    REHAB PLAN:     [  x   ] Bedside P/T 3-5 times a week   [     ] Bedside O/T  2-3 times a week   [     ] No Rehab Therapy Indicated   [     ]  Speech Therapy   Conditioning/ROM                                 ADL  Bed Mobility                                            Conditioning/ROM  Transfers                                                  Bed Mobility  Sitting /Standing Balance                      Transfers                                        Gait Training                                            Sitting/Standing Balance  Stair Training [   ]Applicable                 Home equipment Eval                                                                     Splinting  [   ] Only      GOALS:   ADL   [  x ]   Independent         Transfers  [  x  ] Independent            Ambulation  [  x   ] Independent     [   x  ] With device                            [    ]  CG                                               [    ]  CG                                                    [     ] CG                            [    ] Min A                                          [    ] Min A                                                [     ] Min  A          DISCHARGE PLAN:   [     ]  Good candidate for Intensive Rehabilitation/Hospital based-4A [facility]                                             Will tolerate 3hrs Intensive Rehab Daily                                       [   x   ]  Short Term Rehab in Skilled Nursing Facility                                       [      ]  Home with Outpatient or VN services                                  VS                                     [   x   ]  Possible Candidate for Intensive Hospital based Rehab

## 2018-06-29 NOTE — PROGRESS NOTE ADULT - SUBJECTIVE AND OBJECTIVE BOX
S: Pt seen and examined, resting comfortably in bed. Denies cp/sob/f/c.    PE:  NAD  non labored respirations  RLE:  DRESSING C/D/I  calves soft/nttp  nvid    A/P: 83 y/o F s/p r hip CRPP. POD 1  - WBAT RLE  - PT/REHAB  - oob to chair, IS  - dvt ppx  - pain control  - post op abx S: Pt seen and examined, resting comfortably in bed. Denies cp/sob/f/c.    PE:  NAD  non labored respirations  RLE:  DRESSING C/D/I  calves soft/nttp  nvid    A/P: 81 y/o F s/p r hip CRPP. POD 1  - WBAT RLE  - PT/REHAB  - oob to chair, IS  - dvt ppx  - pain control  - post op abx  pt seen and examined  agree with plan

## 2018-06-29 NOTE — PHYSICAL THERAPY INITIAL EVALUATION ADULT - IMPAIRMENTS FOUND, PT EVAL
gross motor/gait, locomotion, and balance/muscle strength/neuromotor development and sensory integration

## 2018-06-29 NOTE — PHYSICAL THERAPY INITIAL EVALUATION ADULT - IMPAIRMENTS CONTRIBUTING IMPAIRED BED MOBILITY, REHAB EVAL
impaired balance/impaired postural control/impaired coordination/impaired motor control/decreased strength

## 2018-06-29 NOTE — PROGRESS NOTE ADULT - ATTENDING COMMENTS
Patient seen and examined at the bed side. not in distress.   Agree with above note.   S/P HIP surgery and nailing yeaterday. Hemodynamically stable and will follow with PT/ REHAB

## 2018-06-29 NOTE — PHYSICAL THERAPY INITIAL EVALUATION ADULT - LIVES WITH, PROFILE
children/Pt lives with dtg, + stairs and visits her  (6 STEVIE). Currenty pt lives at [facility]- being rehabilitated from stroke 4/2018 ( no steps to enter )

## 2018-06-29 NOTE — OCCUPATIONAL THERAPY INITIAL EVALUATION ADULT - SPECIFY REASON(S)
attempted to see pt for OT eval, pt currently without OOB orders, attempted to contact resident at [phone] will follow up with activity orders updated

## 2018-06-29 NOTE — PHYSICAL THERAPY INITIAL EVALUATION ADULT - IMPAIRMENTS CONTRIBUTING TO GAIT DEVIATIONS, PT EVAL
impaired balance/impaired motor control/narrow base of support/impaired coordination/decreased strength

## 2018-06-29 NOTE — CONSULT NOTE ADULT - SUBJECTIVE AND OBJECTIVE BOX
Patient is a 82y old  Female who presents with a chief complaint of Right Hip Pain (27 Jun 2018 05:50)    HPI:  81 yo F with Past Medical history of HTN, DLD, recent hemorrhagic stroke, initially with R hemiparesis, now largely resolved after intensive therapy, can stand, uses wheelchair for travel, presents to ED for assessment of Right hip pain s/p fall yesterday while she was reaching out to get something while seated in her wheelchair. Initially patient was pain free then had excruciating pain and was unable to stand or attempt ambulation. Patient denies any other associated symptoms.       PCP: Dr. Cameron   Neurology: Dr. Regan   [facility]: [facility] (27 Jun 2018 05:50)      PAST MEDICAL & SURGICAL HISTORY:  HLD (hyperlipidemia)  No significant past surgical history      Hospital Course: R hip xrays with Fem Neck Fx SP CRPP on 6/28    TODAY'S SUBJECTIVE & REVIEW OF SYMPTOMS:     Constitutional WNL   Cardio WNL   Resp WNL   GI WNL  Heme WNL  Endo WNL  Skin WNL  MSK WNL  Neuro WNL  Cognitive WNL  Psych WNL  Urine retention Chronic [place]    MEDICATIONS  (STANDING):  atorvastatin 40 milliGRAM(s) Oral at bedtime  ceFAZolin   IVPB 1000 milliGRAM(s) IV Intermittent every 8 hours  docusate sodium 100 milliGRAM(s) Oral three times a day  enalapril 2.5 milliGRAM(s) Oral daily  heparin  Injectable 5000 Unit(s) SubCutaneous every 8 hours  labetalol 200 milliGRAM(s) Oral every 8 hours  lactated ringers. 1000 milliLiter(s) (75 mL/Hr) IV Continuous <Continuous>  NIFEdipine XL 90 milliGRAM(s) Oral daily  senna 2 Tablet(s) Oral at bedtime  sertraline 100 milliGRAM(s) Oral daily  tamsulosin 0.4 milliGRAM(s) Oral at bedtime    MEDICATIONS  (PRN):  acetaminophen   Tablet. 650 milliGRAM(s) Oral every 6 hours PRN Mild Pain (1 - 3)  morphine  - Injectable 2 milliGRAM(s) IV Push every 4 hours PRN for severe breakthrough pain not responding to oral meds; give oral meds first and wait 30 minutes, if pain still severe (7-10) then may give morphine  oxyCODONE    IR 5 milliGRAM(s) Oral every 4 hours PRN Moderate Pain (4 - 6)  oxyCODONE    IR 10 milliGRAM(s) Oral every 4 hours PRN Severe Pain (7 - 10)      FAMILY HISTORY:  No pertinent family history in first degree relatives      Allergies    Celebrex (Other (Moderate))  sulfonamides (Unknown)    Intolerances        SOCIAL HISTORY:    [    ] Etoh  [    ] Smoking  [    ] Substance abuse     Home Environment:  [    ] Home Alone  [    ] Lives with Family  [    ] Home Health Aid    Dwelling:  [    ] Apartment  [    ] Private House  [    ] Adult Home  [ x   ] Skilled Nursing Facility      [  x  ] Short Term  [    ] Long Term  [    ] Stairs                           [    ] Elevator     FUNCTIONAL STATUS PTA: (Check all that apply)  Ambulation: [     ]Independent    [    ] Dependent     [  x  ] Non-Ambulatory  Assistive Device: [    ] SA Cane  [    ]  Q Cane  [    ] Walker  [    ]  Wheelchair  ADL : [    ] Independent  [  x  ]  Dependent       Vital Signs Last 24 Hrs  T(C): 36.7 (29 Jun 2018 10:32), Max: 36.9 (28 Jun 2018 12:37)  T(F): 98.1 (29 Jun 2018 10:32), Max: 98.5 (28 Jun 2018 22:18)  HR: 70 (29 Jun 2018 10:32) (70 - 89)  BP: 126/60 (29 Jun 2018 10:32) (115/55 - 147/67)  BP(mean): --  RR: 19 (29 Jun 2018 10:32) (12 - 29)  SpO2: 91% (29 Jun 2018 06:26) (91% - 96%)      PHYSICAL EXAM: Alert & Oriented X3  GENERAL: NAD, well-groomed, well-developed  HEAD:  Atraumatic, Normocephalic  EYES: EOMI, PERRLA, conjunctiva and sclera clear  NECK: Supple, No JVD, Normal thyroid  CHEST/LUNG: Clear to percussion bilaterally; No rales, rhonchi, wheezing, or rubs  HEART: Regular rate and rhythm; No murmurs, rubs, or gallops  ABDOMEN: Soft, Nontender, Nondistended; Bowel sounds present  EXTREMITIES:  2+ Peripheral Pulses, No clubbing, cyanosis, or edema    NERVOUS SYSTEM:  Cranial Nerves 2-12 intact [ x   ] Abnormal  [    ]  ROM: WFL all extremities [   x ]  Abnormal [     ]  Motor Strength: WFL all extremities  [    ]  Abnormal [ x   ]4/5 RLE otherwise 5/5  Sensation: intact to light touch [  x  ] Abnormal [    ]      FUNCTIONAL STATUS:  Bed Mobility: [   ]  Independent [    ]  Supervision [ x   ]  Needs Assistance [  ]  N/A  Transfers: [    ]  Independent [    ]  Supervision [  x  ]  Needs Assistance [    ]  N/A    Ambulation:  [    ]  Independent [    ]  Supervision [ x   ]  Needs Assistance [    ]  N/A   ADL:  [    ]   Independent [ x   ] Requires Assistance [    ] N/A       LABS:                        10.0   7.42  )-----------( 228      ( 29 Jun 2018 06:21 )             31.1     06-29    144  |  102  |  24<H>  ----------------------------<  57<L>  4.5   |  25  |  0.6<L>    Ca    8.8      29 Jun 2018 06:21  Mg     1.9     06-28      PT/INR - ( 28 Jun 2018 12:06 )   PT: 12.60 sec;   INR: 1.17 ratio         PTT - ( 28 Jun 2018 12:06 )  PTT:37.1 sec      RADIOLOGY & ADDITIONAL STUDIES:

## 2018-06-29 NOTE — PHYSICAL THERAPY INITIAL EVALUATION ADULT - PLANNED THERAPY INTERVENTIONS, PT EVAL
motor coordination training/neuromuscular re-education/transfer training/gait training/strengthening/balance training/bed mobility training

## 2018-06-29 NOTE — PROGRESS NOTE ADULT - SUBJECTIVE AND OBJECTIVE BOX
PGY I NOTE    LENGTH OF HOSPITAL STAY:  2d    CHIEF COMPLAINT:Patient is a 82y old  Female who presents with a chief complaint of Right Hip Pain (27 Jun 2018 05:50)    HPI:HPI:  81 yo F with Past Medical history of HTN, DLD, recent hemorrhagic stroke, initially with R hemiparesis, now largely resolved after intensive therapy, can stand, uses wheelchair for travel, presents to ED for assessment of Right hip pain s/p fall yesterday while she was reaching out to get something while seated in her wheelchair. Initially patient was pain free then had excruciating pain and was unable to stand or attempt ambulation. Patient denies any other associated symptoms.       PCP: Dr. Cameron   Neurology: Dr. Regan   [facility] (27 Jun 2018 05:50)    OVERNIGHT EVENTS/UPDATES: no acute event overnight    PMH & PSH  PAST MEDICAL & SURGICAL HISTORY:  HLD (hyperlipidemia)  No significant past surgical history    SOCIAL HISTORY: Negative    ALLERGIES: Celebrex (Other (Moderate))  sulfonamides (Unknown)    HOME MEDICATIONS  Home Medications:  atorvastatin 40 mg oral tablet: 1 tab(s) orally once a day (at bedtime) (22 May 2018 15:59)  docusate sodium 100 mg oral capsule: 1 cap(s) orally once a day (22 May 2018 15:59)  enalapril 2.5 mg oral tablet: 1 tab(s) orally once a day at noon (23 May 2018 10:00)  heparin 5000 units/mL injectable solution: 5000 unit(s) injectable every 8 hours (22 May 2018 15:59)  labetalol 200 mg oral tablet: 1 tab(s) orally every 8 hours (22 May 2018 15:59)  NIFEdipine 90 mg oral tablet, extended release: 1 tab(s) orally once a day (22 May 2018 15:59)  ocular lubricant ophthalmic solution: 1 drop(s) to right eye every 6 hours while awake (22 May 2018 15:59)  polyethylene glycol 3350 oral powder for reconstitution: 17 gram(s) orally every 12 hours (22 May 2018 15:59)  senna oral tablet: 2 tab(s) orally once a day (at bedtime) (22 May 2018 15:59)  sertraline 100 mg oral tablet: 1 tab(s) orally once a day (22 May 2018 15:59)  tamsulosin 0.4 mg oral capsule: 2 cap(s) orally once a day (at bedtime) (22 May 2018 15:59)    PHYSICAL EXAM:  T(F): 97.1 (06-29-18 @ 06:30), Max: 98.5 (06-28-18 @ 22:18)  HR: 75 (06-29-18 @ 06:30)  BP: 147/67 (06-29-18 @ 06:30)  RR: 18 (06-29-18 @ 06:30)  SpO2: 91% (06-29-18 @ 06:26)  CAPILLARY BLOOD GLUCOSE        I&O's Summary    28 Jun 2018 07:01  -  29 Jun 2018 07:00  --------------------------------------------------------  IN: 150 mL / OUT: 285 mL / NET: -135 mL      General: NAD  HEENT: NCAT, no JVD  CV: RRR  RESP: CTAB  Abdominal: Soft, NTTP, non-distended  Extremity: no c/c/e  Neuro: A&O x3, non-focal    MEDICATIONS  STANDING MEDICATIONS  atorvastatin 40 milliGRAM(s) Oral at bedtime  ceFAZolin   IVPB 1000 milliGRAM(s) IV Intermittent every 8 hours  docusate sodium 100 milliGRAM(s) Oral three times a day  enalapril 2.5 milliGRAM(s) Oral daily  heparin  Injectable 5000 Unit(s) SubCutaneous every 8 hours  labetalol 200 milliGRAM(s) Oral every 8 hours  lactated ringers. 1000 milliLiter(s) IV Continuous <Continuous>  NIFEdipine XL 90 milliGRAM(s) Oral daily  senna 2 Tablet(s) Oral at bedtime  sertraline 100 milliGRAM(s) Oral daily  tamsulosin 0.4 milliGRAM(s) Oral at bedtime    PRN MEDICATIONS  acetaminophen   Tablet. 650 milliGRAM(s) Oral every 6 hours PRN  morphine  - Injectable 2 milliGRAM(s) IV Push every 4 hours PRN  oxyCODONE    IR 5 milliGRAM(s) Oral every 4 hours PRN  oxyCODONE    IR 10 milliGRAM(s) Oral every 4 hours PRN    LABS:                        10.0   7.42  )-----------( 228      ( 29 Jun 2018 06:21 )             31.1              06-29    144  |  102  |  24<H>  ----------------------------<  57<L>  4.5   |  25  |  0.6<L>    Ca    8.8      29 Jun 2018 06:21  Mg     1.9     06-28                   PT/INR - ( 28 Jun 2018 12:06 )   PT: 12.60 sec;   INR: 1.17 ratio         PTT - ( 28 Jun 2018 12:06 )  PTT:37.1 sec

## 2018-06-29 NOTE — PHYSICAL THERAPY INITIAL EVALUATION ADULT - PERTINENT HX OF CURRENT PROBLEM, REHAB EVAL
81yo F with R hip pain. Fell out of wheelchair resulting in R hip fx. Hx of hemorrhagic stroke 4/2018.

## 2018-06-29 NOTE — PROGRESS NOTE ADULT - ASSESSMENT
#Right femoral neck hip fracture s/p CRPP (POD1)  -pain control  -bowel regimen  -monitor hgb, stable today  -dvt ppx    #Hx of hemorrhagic stroke in April with residual right hemiparesis  -stable, f/u w/ neuro outpatient  -c/w sertraline    #Urinary retention w/ chronic Cleveland  -c/w tamsulosin  -was going to Dr. Kirk for TOV but unable to d/t fx  -will exchange for a new Cleveland today and once pt able to ambulate will do TOV    #HTN  -c/w CCB and ACE-i and BB    #DLD  -c/w statin    #DVT ppx - heparin subQ  #Code status - full Code   #Dispo - home #Right femoral neck hip fracture s/p CRPP (POD1)  -pain control w/ tylenol, oxycodone, morphine prn  -bowel regimen w/ senna and colace  -monitor hgb, stable today  -dvt ppx w/ heparin subQ    #Urinary retention w/ chronic [place]  -c/w tamsulosin  -was going to Dr. Kirk for TOV but unable to d/t fx  -will exchange for a new Cleveland today and once pt able to ambulate will do TOV    #Hx of hemorrhagic stroke in April with residual right hemiparesis  -stable, f/u w/ neuro outpatient  -c/w sertraline    #HTN  -c/w CCB and ACE-i and BB    #DLD  -c/w statin    #DVT ppx - heparin subQ  #Code status - full Code   #Dispo - home #Right femoral neck hip fracture s/p CRPP (POD1)  -pain control w/ tylenol, oxycodone, morphine prn  -bowel regimen w/ senna and colace  -monitor hgb, stable today  -dvt ppx w/ heparin subQ  -PT, pending eval    #Urinary retention w/ chronic [place]  -c/w tamsulosin  -was going to Dr. Kirk for TOV but unable to d/t fx  -will exchange for a new [place] today and once pt able to ambulate will do TOV    #Hx of hemorrhagic stroke in April with residual right hemiparesis  -stable, f/u w/ neuro outpatient  -c/w sertraline    #HTN  -c/w CCB and ACE-i and BB    #DLD  -c/w statin    #DVT ppx - heparin subQ  #Code status - full Code   #Dispo - from [facility], will go back

## 2018-06-30 LAB
ANION GAP SERPL CALC-SCNC: 10 MMOL/L — SIGNIFICANT CHANGE UP (ref 7–14)
BLD GP AB SCN SERPL QL: SIGNIFICANT CHANGE UP
BUN SERPL-MCNC: 16 MG/DL — SIGNIFICANT CHANGE UP (ref 10–20)
CALCIUM SERPL-MCNC: 8.4 MG/DL — LOW (ref 8.5–10.1)
CHLORIDE SERPL-SCNC: 104 MMOL/L — SIGNIFICANT CHANGE UP (ref 98–110)
CO2 SERPL-SCNC: 28 MMOL/L — SIGNIFICANT CHANGE UP (ref 17–32)
CREAT SERPL-MCNC: 0.5 MG/DL — LOW (ref 0.7–1.5)
GLUCOSE SERPL-MCNC: 126 MG/DL — HIGH (ref 70–99)
HCT VFR BLD CALC: 29.9 % — LOW (ref 37–47)
HGB BLD-MCNC: 9.9 G/DL — LOW (ref 12–16)
MCHC RBC-ENTMCNC: 27 PG — SIGNIFICANT CHANGE UP (ref 27–31)
MCHC RBC-ENTMCNC: 33.1 G/DL — SIGNIFICANT CHANGE UP (ref 32–37)
MCV RBC AUTO: 81.7 FL — SIGNIFICANT CHANGE UP (ref 81–99)
NRBC # BLD: 0 /100 WBCS — SIGNIFICANT CHANGE UP (ref 0–0)
PLATELET # BLD AUTO: 253 K/UL — SIGNIFICANT CHANGE UP (ref 130–400)
POTASSIUM SERPL-MCNC: 4 MMOL/L — SIGNIFICANT CHANGE UP (ref 3.5–5)
POTASSIUM SERPL-SCNC: 4 MMOL/L — SIGNIFICANT CHANGE UP (ref 3.5–5)
RBC # BLD: 3.66 M/UL — LOW (ref 4.2–5.4)
RBC # FLD: 13.2 % — SIGNIFICANT CHANGE UP (ref 11.5–14.5)
SODIUM SERPL-SCNC: 142 MMOL/L — SIGNIFICANT CHANGE UP (ref 135–146)
TYPE + AB SCN PNL BLD: SIGNIFICANT CHANGE UP
WBC # BLD: 6.28 K/UL — SIGNIFICANT CHANGE UP (ref 4.8–10.8)
WBC # FLD AUTO: 6.28 K/UL — SIGNIFICANT CHANGE UP (ref 4.8–10.8)

## 2018-06-30 RX ADMIN — Medication 200 MILLIGRAM(S): at 21:37

## 2018-06-30 RX ADMIN — HEPARIN SODIUM 5000 UNIT(S): 5000 INJECTION INTRAVENOUS; SUBCUTANEOUS at 05:43

## 2018-06-30 RX ADMIN — HEPARIN SODIUM 5000 UNIT(S): 5000 INJECTION INTRAVENOUS; SUBCUTANEOUS at 12:16

## 2018-06-30 RX ADMIN — Medication 90 MILLIGRAM(S): at 05:41

## 2018-06-30 RX ADMIN — TAMSULOSIN HYDROCHLORIDE 0.4 MILLIGRAM(S): 0.4 CAPSULE ORAL at 21:37

## 2018-06-30 RX ADMIN — ATORVASTATIN CALCIUM 40 MILLIGRAM(S): 80 TABLET, FILM COATED ORAL at 21:37

## 2018-06-30 RX ADMIN — Medication 200 MILLIGRAM(S): at 13:05

## 2018-06-30 RX ADMIN — HEPARIN SODIUM 5000 UNIT(S): 5000 INJECTION INTRAVENOUS; SUBCUTANEOUS at 21:38

## 2018-06-30 RX ADMIN — SERTRALINE 100 MILLIGRAM(S): 25 TABLET, FILM COATED ORAL at 12:17

## 2018-06-30 RX ADMIN — Medication 200 MILLIGRAM(S): at 05:41

## 2018-06-30 RX ADMIN — Medication 2.5 MILLIGRAM(S): at 05:42

## 2018-06-30 RX ADMIN — SODIUM CHLORIDE 75 MILLILITER(S): 9 INJECTION, SOLUTION INTRAVENOUS at 12:28

## 2018-06-30 RX ADMIN — Medication 100 MILLIGRAM(S): at 05:41

## 2018-06-30 RX ADMIN — HEPARIN SODIUM 5000 UNIT(S): 5000 INJECTION INTRAVENOUS; SUBCUTANEOUS at 13:05

## 2018-06-30 NOTE — PROGRESS NOTE ADULT - SUBJECTIVE AND OBJECTIVE BOX
PGY I NOTE    LENGTH OF HOSPITAL STAY:  3d    CHIEF COMPLAINT:Patient is a 82y old  Female who presents with a chief complaint of Right Hip Pain (27 Jun 2018 05:50)    HPI:HPI:  83 yo F with Past Medical history of HTN, DLD, recent hemorrhagic stroke, initially with R hemiparesis, now largely resolved after intensive therapy, can stand, uses wheelchair for travel, presents to ED for assessment of Right hip pain s/p fall yesterday while she was reaching out to get something while seated in her wheelchair. Initially patient was pain free then had excruciating pain and was unable to stand or attempt ambulation. Patient denies any other associated symptoms.       PCP: Dr. Cameron   Neurology: Dr. Regan   [facility] (27 Jun 2018 05:50)    OVERNIGHT EVENTS/UPDATES: no acute event overnight    PMH & PSH  PAST MEDICAL & SURGICAL HISTORY:  HLD (hyperlipidemia)  No significant past surgical history    SOCIAL HISTORY: Negative    ALLERGIES: Celebrex (Other (Moderate))  sulfonamides (Unknown)    HOME MEDICATIONS  Home Medications:  atorvastatin 40 mg oral tablet: 1 tab(s) orally once a day (at bedtime) (22 May 2018 15:59)  docusate sodium 100 mg oral capsule: 1 cap(s) orally once a day (22 May 2018 15:59)  enalapril 2.5 mg oral tablet: 1 tab(s) orally once a day at noon (23 May 2018 10:00)  heparin 5000 units/mL injectable solution: 5000 unit(s) injectable every 8 hours (22 May 2018 15:59)  labetalol 200 mg oral tablet: 1 tab(s) orally every 8 hours (22 May 2018 15:59)  NIFEdipine 90 mg oral tablet, extended release: 1 tab(s) orally once a day (22 May 2018 15:59)  ocular lubricant ophthalmic solution: 1 drop(s) to right eye every 6 hours while awake (22 May 2018 15:59)  polyethylene glycol 3350 oral powder for reconstitution: 17 gram(s) orally every 12 hours (22 May 2018 15:59)  senna oral tablet: 2 tab(s) orally once a day (at bedtime) (22 May 2018 15:59)  sertraline 100 mg oral tablet: 1 tab(s) orally once a day (22 May 2018 15:59)  tamsulosin 0.4 mg oral capsule: 2 cap(s) orally once a day (at bedtime) (22 May 2018 15:59)    PHYSICAL EXAM:  T(F): 98.7 (06-30-18 @ 14:00), Max: 100 (06-30-18 @ 00:00)  HR: 84 (06-30-18 @ 14:00)  BP: 127/73 (06-30-18 @ 14:00)  RR: 19 (06-30-18 @ 14:00)  SpO2: --  CAPILLARY BLOOD GLUCOSE        I&O's Summary    29 Jun 2018 07:01  -  30 Jun 2018 07:00  --------------------------------------------------------  IN: 1075 mL / OUT: 820 mL / NET: 255 mL    30 Jun 2018 07:01  -  30 Jun 2018 20:31  --------------------------------------------------------  IN: 0 mL / OUT: 200 mL / NET: -200 mL      General: NAD  HEENT: NCAT, no JVD  CV: RRR  RESP: CTAB  Abdominal: Soft, NTTP, non-distended  Extremity: no c/c/e  Neuro: A&O x3, non-focal    MEDICATIONS  STANDING MEDICATIONS  atorvastatin 40 milliGRAM(s) Oral at bedtime  docusate sodium 100 milliGRAM(s) Oral three times a day  enalapril 2.5 milliGRAM(s) Oral daily  heparin  Injectable 5000 Unit(s) SubCutaneous every 8 hours  labetalol 200 milliGRAM(s) Oral every 8 hours  lactated ringers. 1000 milliLiter(s) IV Continuous <Continuous>  NIFEdipine XL 90 milliGRAM(s) Oral daily  senna 2 Tablet(s) Oral at bedtime  sertraline 100 milliGRAM(s) Oral daily  tamsulosin 0.4 milliGRAM(s) Oral at bedtime    PRN MEDICATIONS  acetaminophen   Tablet. 650 milliGRAM(s) Oral every 6 hours PRN  morphine  - Injectable 2 milliGRAM(s) IV Push every 4 hours PRN  oxyCODONE    IR 5 milliGRAM(s) Oral every 4 hours PRN  oxyCODONE    IR 10 milliGRAM(s) Oral every 4 hours PRN    LABS:                        9.9    6.28  )-----------( 253      ( 30 Jun 2018 10:35 )             29.9              06-30    142  |  104  |  16  ----------------------------<  126<H>  4.0   |  28  |  0.5<L>    Ca    8.4<L>      30 Jun 2018 10:35

## 2018-06-30 NOTE — PROGRESS NOTE ADULT - ASSESSMENT
83 yo F with Past Medical history of HTN, DLD, recent hemorrhagic stroke, initially with R hemiparesis, now largely resolved after intensive therapy, can stand, uses wheelchair for travel, presents to ED for assessment of Right hip pain s/p fall found to have right femoral neck hip fracture    1-Right femoral neck hip fracture s/p CRPP :   -pain control w/ tylenol, oxycodone, morphine prn  -bowel regimen w/ senna and colace    2-Urinary retention w/ chronic Gonzales:  -c/w tamsulosin  -Change gonzales catheter    3-Hx of hemorrhagic stroke in April with residual right hemiparesis:  -stable, f/u w/ neuro outpatient  -c/w sertraline    4-Hypertension: c/w CCB and ACE-i and BB    5-DLD: c/w statin  6-DVT, GI PPX  Disposition: [facility] on Monday  Pager No. [phone]

## 2018-06-30 NOTE — PROGRESS NOTE ADULT - SUBJECTIVE AND OBJECTIVE BOX
Chief Complaint:  Patient is a 82y old  Female who presents with a chief complaint of Right Hip Pain (27 Jun 2018 05:50)      Interval Events:     Allergies:  Celebrex (Other (Moderate))  sulfonamides (Unknown)      Home Medications:    Hospital Medications:  acetaminophen   Tablet. 650 milliGRAM(s) Oral every 6 hours PRN  atorvastatin 40 milliGRAM(s) Oral at bedtime  docusate sodium 100 milliGRAM(s) Oral three times a day  enalapril 2.5 milliGRAM(s) Oral daily  heparin  Injectable 5000 Unit(s) SubCutaneous every 8 hours  labetalol 200 milliGRAM(s) Oral every 8 hours  lactated ringers. 1000 milliLiter(s) IV Continuous <Continuous>  morphine  - Injectable 2 milliGRAM(s) IV Push every 4 hours PRN  NIFEdipine XL 90 milliGRAM(s) Oral daily  oxyCODONE    IR 5 milliGRAM(s) Oral every 4 hours PRN  oxyCODONE    IR 10 milliGRAM(s) Oral every 4 hours PRN  senna 2 Tablet(s) Oral at bedtime  sertraline 100 milliGRAM(s) Oral daily  tamsulosin 0.4 milliGRAM(s) Oral at bedtime      PMHX/PSHX:  HLD (hyperlipidemia)  No significant past surgical history      Family history:  No pertinent family history in first degree relatives      ROS:   As mentioned below      PHYSICAL EXAM:   Vital Signs:  Vital Signs Last 24 Hrs  T(C): 36.4 (30 Jun 2018 04:24), Max: 37.8 (30 Jun 2018 00:00)  T(F): 97.5 (30 Jun 2018 04:24), Max: 100 (30 Jun 2018 00:00)  HR: 84 (30 Jun 2018 08:26) (66 - 92)  BP: 161/71 (30 Jun 2018 08:26) (95/46 - 177/78)  BP(mean): --  RR: 18 (30 Jun 2018 04:24) (18 - 18)  SpO2: 90% (29 Jun 2018 19:45) (90% - 90%)  Daily     Daily     GENERAL:  NAD  HEENT:  NC/AT,  No Thyromegaly  CHEST:  CTA B/L  HEART:  S1, S2- No M, R, G  ABDOMEN:  Soft, NT, ND  EXTEREMITIES:  no cyanosis,clubbing or edema  SKIN:  NAD  NEURO:  Grossly Nr.    LABS:                        9.9    6.28  )-----------( 253      ( 30 Jun 2018 10:35 )             29.9     06-30    142  |  104  |  16  ----------------------------<  126<H>  4.0   |  28  |  0.5<L>    Ca    8.4<L>      30 Jun 2018 10:35                Imaging:

## 2018-06-30 NOTE — PROGRESS NOTE ADULT - SUBJECTIVE AND OBJECTIVE BOX
S: Pt seen and examined, resting comfortably in bed. Denies cp/sob/f/c.    PE:  NAD  non labored respirations  RLE:  DRESSING C/D/I  calves soft/nttp  nvid    A/P: 83 y/o F s/p r hip CRPP. POD 2  - WBAT RLE  - PT/REHAB  - oob to chair, IS  - dvt ppx  - pain control

## 2018-06-30 NOTE — PROGRESS NOTE ADULT - ASSESSMENT
#Right femoral neck hip fracture s/p CRPP (POD1)  -pain control w/ tylenol, oxycodone, morphine prn  -bowel regimen w/ senna and colace  -monitor hgb, stable today  -dvt ppx w/ heparin subQ  -c/w PT, STR vs possible 4A    #Urinary retention w/ chronic [place]  -c/w tamsulosin  -was going to Dr. 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

## 2018-07-01 LAB
ANION GAP SERPL CALC-SCNC: 12 MMOL/L — SIGNIFICANT CHANGE UP (ref 7–14)
BUN SERPL-MCNC: 10 MG/DL — SIGNIFICANT CHANGE UP (ref 10–20)
CALCIUM SERPL-MCNC: 8.3 MG/DL — LOW (ref 8.5–10.1)
CHLORIDE SERPL-SCNC: 105 MMOL/L — SIGNIFICANT CHANGE UP (ref 98–110)
CO2 SERPL-SCNC: 26 MMOL/L — SIGNIFICANT CHANGE UP (ref 17–32)
CREAT SERPL-MCNC: 0.5 MG/DL — LOW (ref 0.7–1.5)
GLUCOSE SERPL-MCNC: 169 MG/DL — HIGH (ref 70–99)
HCT VFR BLD CALC: 28.3 % — LOW (ref 37–47)
HGB BLD-MCNC: 9.3 G/DL — LOW (ref 12–16)
MCHC RBC-ENTMCNC: 26.9 PG — LOW (ref 27–31)
MCHC RBC-ENTMCNC: 32.9 G/DL — SIGNIFICANT CHANGE UP (ref 32–37)
MCV RBC AUTO: 81.8 FL — SIGNIFICANT CHANGE UP (ref 81–99)
NRBC # BLD: 0 /100 WBCS — SIGNIFICANT CHANGE UP (ref 0–0)
PLATELET # BLD AUTO: 253 K/UL — SIGNIFICANT CHANGE UP (ref 130–400)
POTASSIUM SERPL-MCNC: 3.5 MMOL/L — SIGNIFICANT CHANGE UP (ref 3.5–5)
POTASSIUM SERPL-SCNC: 3.5 MMOL/L — SIGNIFICANT CHANGE UP (ref 3.5–5)
RBC # BLD: 3.46 M/UL — LOW (ref 4.2–5.4)
RBC # FLD: 13.4 % — SIGNIFICANT CHANGE UP (ref 11.5–14.5)
SODIUM SERPL-SCNC: 143 MMOL/L — SIGNIFICANT CHANGE UP (ref 135–146)
WBC # BLD: 6.34 K/UL — SIGNIFICANT CHANGE UP (ref 4.8–10.8)
WBC # FLD AUTO: 6.34 K/UL — SIGNIFICANT CHANGE UP (ref 4.8–10.8)

## 2018-07-01 RX ORDER — LANOLIN ALCOHOL/MO/W.PET/CERES
3 CREAM (GRAM) TOPICAL AT BEDTIME
Qty: 0 | Refills: 0 | Status: DISCONTINUED | OUTPATIENT
Start: 2018-07-01 | End: 2018-07-04

## 2018-07-01 RX ADMIN — Medication 200 MILLIGRAM(S): at 14:41

## 2018-07-01 RX ADMIN — SODIUM CHLORIDE 75 MILLILITER(S): 9 INJECTION, SOLUTION INTRAVENOUS at 02:06

## 2018-07-01 RX ADMIN — SODIUM CHLORIDE 75 MILLILITER(S): 9 INJECTION, SOLUTION INTRAVENOUS at 14:41

## 2018-07-01 RX ADMIN — ATORVASTATIN CALCIUM 40 MILLIGRAM(S): 80 TABLET, FILM COATED ORAL at 21:26

## 2018-07-01 RX ADMIN — Medication 200 MILLIGRAM(S): at 21:27

## 2018-07-01 RX ADMIN — Medication 90 MILLIGRAM(S): at 05:48

## 2018-07-01 RX ADMIN — Medication 200 MILLIGRAM(S): at 05:50

## 2018-07-01 RX ADMIN — HEPARIN SODIUM 5000 UNIT(S): 5000 INJECTION INTRAVENOUS; SUBCUTANEOUS at 21:27

## 2018-07-01 RX ADMIN — Medication 3 MILLIGRAM(S): at 21:27

## 2018-07-01 RX ADMIN — TAMSULOSIN HYDROCHLORIDE 0.4 MILLIGRAM(S): 0.4 CAPSULE ORAL at 21:26

## 2018-07-01 RX ADMIN — HEPARIN SODIUM 5000 UNIT(S): 5000 INJECTION INTRAVENOUS; SUBCUTANEOUS at 05:48

## 2018-07-01 RX ADMIN — Medication 2.5 MILLIGRAM(S): at 05:49

## 2018-07-01 RX ADMIN — SERTRALINE 100 MILLIGRAM(S): 25 TABLET, FILM COATED ORAL at 11:41

## 2018-07-01 RX ADMIN — HEPARIN SODIUM 5000 UNIT(S): 5000 INJECTION INTRAVENOUS; SUBCUTANEOUS at 14:41

## 2018-07-01 NOTE — PROGRESS NOTE ADULT - SUBJECTIVE AND OBJECTIVE BOX
S: Pt seen and examined, resting comfortably in bed. Denies cp/sob/f/c.    PE:  NAD  non labored respirations  RLE:  DRESSING C/D/I  calves soft/nttp  nvid    A/P: 81 y/o F s/p r hip CRPP. POD 3  - WBAT RLE  - PT/REHAB  - oob to chair, IS  - dvt ppx  - pain control

## 2018-07-01 NOTE — PROGRESS NOTE ADULT - ASSESSMENT
83 yo F with Past Medical history of HTN, DLD, recent hemorrhagic stroke, initially with R hemiparesis, now largely resolved after intensive therapy, can stand, uses wheelchair for travel, presents to ED for assessment of Right hip pain s/p fall found to have right femoral neck hip fracture    1-Right femoral neck hip fracture s/p CRPP :   -pain control w/ tylenol, oxycodone, morphine prn  -bowel regimen w/ senna and colace    2-Urinary retention w/ chronic [place]:  -c/w tamsulosin  -Change gonzales catheter    3-Hx of hemorrhagic stroke in April with residual right hemiparesis:  -stable, f/u w/ neuro outpatient  -c/w sertraline    4-Hypertension: c/w CCB and ACE-i and BB    5-DLD: c/w statin  6-DVT, GI PPX  Disposition: [facility] on Monday  Pager No. [phone]

## 2018-07-01 NOTE — PROGRESS NOTE ADULT - SUBJECTIVE AND OBJECTIVE BOX
Chief Complaint:  Patient is a 82y old  Female who presents with a chief complaint of Right Hip Pain (27 Jun 2018 05:50)      Interval Events:     Allergies:  Celebrex (Other (Moderate))  sulfonamides (Unknown)      Home Medications:    Hospital Medications:  acetaminophen   Tablet. 650 milliGRAM(s) Oral every 6 hours PRN  atorvastatin 40 milliGRAM(s) Oral at bedtime  docusate sodium 100 milliGRAM(s) Oral three times a day  enalapril 2.5 milliGRAM(s) Oral daily  heparin  Injectable 5000 Unit(s) SubCutaneous every 8 hours  labetalol 200 milliGRAM(s) Oral every 8 hours  lactated ringers. 1000 milliLiter(s) IV Continuous <Continuous>  morphine  - Injectable 2 milliGRAM(s) IV Push every 4 hours PRN  NIFEdipine XL 90 milliGRAM(s) Oral daily  oxyCODONE    IR 5 milliGRAM(s) Oral every 4 hours PRN  oxyCODONE    IR 10 milliGRAM(s) Oral every 4 hours PRN  senna 2 Tablet(s) Oral at bedtime  sertraline 100 milliGRAM(s) Oral daily  tamsulosin 0.4 milliGRAM(s) Oral at bedtime      PMHX/PSHX:  HLD (hyperlipidemia)  No significant past surgical history      Family history:  No pertinent family history in first degree relatives      ROS:   As mentioned below      PHYSICAL EXAM:   Vital Signs:  Vital Signs Last 24 Hrs  T(C): 36.3 (01 Jul 2018 12:22), Max: 37 (30 Jun 2018 22:12)  T(F): 97.4 (01 Jul 2018 12:22), Max: 98.6 (30 Jun 2018 22:12)  HR: 72 (01 Jul 2018 12:22) (72 - 90)  BP: 152/66 (01 Jul 2018 12:22) (142/66 - 179/79)  BP(mean): --  RR: 18 (01 Jul 2018 12:22) (18 - 18)  SpO2: 90% (01 Jul 2018 09:15) (89% - 90%)  Daily     Daily     GENERAL:  NAD  HEENT:  NC/AT,  No Thyromegaly  CHEST:  CTA B/L  HEART:  S1, S2- No M, R, G  ABDOMEN:  Soft, NT, ND  EXTEREMITIES:  no cyanosis,clubbing or edema  SKIN:  NAD  NEURO:  Grossly Nr.    LABS:                        9.3    6.34  )-----------( 253      ( 01 Jul 2018 09:31 )             28.3     07-01    143  |  105  |  10  ----------------------------<  169<H>  3.5   |  26  |  0.5<L>    Ca    8.3<L>      01 Jul 2018 09:31                Imaging:

## 2018-07-02 LAB
ANION GAP SERPL CALC-SCNC: 11 MMOL/L — SIGNIFICANT CHANGE UP (ref 7–14)
BUN SERPL-MCNC: 9 MG/DL — LOW (ref 10–20)
CALCIUM SERPL-MCNC: 8.7 MG/DL — SIGNIFICANT CHANGE UP (ref 8.5–10.1)
CHLORIDE SERPL-SCNC: 101 MMOL/L — SIGNIFICANT CHANGE UP (ref 98–110)
CO2 SERPL-SCNC: 28 MMOL/L — SIGNIFICANT CHANGE UP (ref 17–32)
CREAT SERPL-MCNC: <0.5 MG/DL — LOW (ref 0.7–1.5)
GLUCOSE SERPL-MCNC: 99 MG/DL — SIGNIFICANT CHANGE UP (ref 70–99)
HCT VFR BLD CALC: 31.6 % — LOW (ref 37–47)
HGB BLD-MCNC: 10.1 G/DL — LOW (ref 12–16)
MCHC RBC-ENTMCNC: 26.4 PG — LOW (ref 27–31)
MCHC RBC-ENTMCNC: 32 G/DL — SIGNIFICANT CHANGE UP (ref 32–37)
MCV RBC AUTO: 82.5 FL — SIGNIFICANT CHANGE UP (ref 81–99)
NRBC # BLD: 0 /100 WBCS — SIGNIFICANT CHANGE UP (ref 0–0)
PLATELET # BLD AUTO: 283 K/UL — SIGNIFICANT CHANGE UP (ref 130–400)
POTASSIUM SERPL-MCNC: 4.5 MMOL/L — SIGNIFICANT CHANGE UP (ref 3.5–5)
POTASSIUM SERPL-SCNC: 4.5 MMOL/L — SIGNIFICANT CHANGE UP (ref 3.5–5)
RBC # BLD: 3.83 M/UL — LOW (ref 4.2–5.4)
RBC # FLD: 13.3 % — SIGNIFICANT CHANGE UP (ref 11.5–14.5)
SODIUM SERPL-SCNC: 140 MMOL/L — SIGNIFICANT CHANGE UP (ref 135–146)
WBC # BLD: 7.53 K/UL — SIGNIFICANT CHANGE UP (ref 4.8–10.8)
WBC # FLD AUTO: 7.53 K/UL — SIGNIFICANT CHANGE UP (ref 4.8–10.8)

## 2018-07-02 PROCEDURE — 93970 EXTREMITY STUDY: CPT | Mod: 26

## 2018-07-02 RX ADMIN — ATORVASTATIN CALCIUM 40 MILLIGRAM(S): 80 TABLET, FILM COATED ORAL at 21:30

## 2018-07-02 RX ADMIN — SERTRALINE 100 MILLIGRAM(S): 25 TABLET, FILM COATED ORAL at 12:13

## 2018-07-02 RX ADMIN — SODIUM CHLORIDE 75 MILLILITER(S): 9 INJECTION, SOLUTION INTRAVENOUS at 05:17

## 2018-07-02 RX ADMIN — Medication 3 MILLIGRAM(S): at 21:30

## 2018-07-02 RX ADMIN — Medication 200 MILLIGRAM(S): at 21:30

## 2018-07-02 RX ADMIN — HEPARIN SODIUM 5000 UNIT(S): 5000 INJECTION INTRAVENOUS; SUBCUTANEOUS at 16:11

## 2018-07-02 RX ADMIN — HEPARIN SODIUM 5000 UNIT(S): 5000 INJECTION INTRAVENOUS; SUBCUTANEOUS at 21:31

## 2018-07-02 RX ADMIN — Medication 200 MILLIGRAM(S): at 16:11

## 2018-07-02 RX ADMIN — HEPARIN SODIUM 5000 UNIT(S): 5000 INJECTION INTRAVENOUS; SUBCUTANEOUS at 05:16

## 2018-07-02 RX ADMIN — TAMSULOSIN HYDROCHLORIDE 0.4 MILLIGRAM(S): 0.4 CAPSULE ORAL at 21:31

## 2018-07-02 RX ADMIN — Medication 2.5 MILLIGRAM(S): at 05:12

## 2018-07-02 RX ADMIN — Medication 200 MILLIGRAM(S): at 05:14

## 2018-07-02 RX ADMIN — Medication 100 MILLIGRAM(S): at 16:11

## 2018-07-02 RX ADMIN — Medication 90 MILLIGRAM(S): at 05:14

## 2018-07-02 NOTE — PROGRESS NOTE ADULT - ASSESSMENT
81 yo F with Past Medical history of HTN, DLD, recent hemorrhagic stroke, initially with R hemiparesis, now largely resolved after intensive therapy, presents to ED for assessment of Right hip pain s/p fall found to have right femoral neck hip fracture      patient is SaO2  1-Right femoral neck hip fracture  :     s/p CRPP  per ortho: the patient is cleared to be discharge  pain control w/ tylenol, oxycodone, morphine prn  bowel regimen w/ senna and colace    2-Urinary retention w/ chronic [place]:    c/w tamsulosin  patient to be discharge on [place] and undergo TOV as outpatient    4-Hypertension:     controlled  CCB and ACE-i and BB      6-DVT, GI PPX 83 yo F with Past Medical history of HTN, DLD, recent hemorrhagic stroke, initially with R hemiparesis, now largely resolved after intensive therapy, presents to ED for assessment of Right hip pain s/p fall found to have right femoral neck hip fracture      patient's SaO2 on room air 88%.  patient to receive 2L/min O2 NC.  repeat CXR  anticipate to d/c tomorrow    1-Right femoral neck hip fracture  :     s/p CRPP  per ortho: the patient is cleared to be discharge  pain control w/ tylenol, oxycodone, morphine prn  bowel regimen w/ senna and colace    2- Urinary retention w/ chronic [place]:    c/w tamsulosin  patient to be discharge on [place] and undergo TOV as outpatient    3- Hypertension:     controlled  CCB and ACE-i and BB    4- DVT, GI PPX

## 2018-07-02 NOTE — PROGRESS NOTE ADULT - SUBJECTIVE AND OBJECTIVE BOX
SUBJECTIVE:    Patient is a 82y old Female who presents with a chief complaint of Right Hip Pain (27 Jun 2018 05:50)    Currently admitted to medicine with the primary diagnosis of Femoral neck fracture     Today is hospital day 5d. This morning she is resting comfortably in bed and reports no new issues or overnight events.     PAST MEDICAL & SURGICAL HISTORY  HLD (hyperlipidemia)  No significant past surgical history    SOCIAL HISTORY:  Negative for smoking/alcohol/drug use.     ALLERGIES:  Celebrex (Other (Moderate))  sulfonamides (Unknown)    MEDICATIONS:  STANDING MEDICATIONS  atorvastatin 40 milliGRAM(s) Oral at bedtime  docusate sodium 100 milliGRAM(s) Oral three times a day  enalapril 2.5 milliGRAM(s) Oral daily  heparin  Injectable 5000 Unit(s) SubCutaneous every 8 hours  labetalol 200 milliGRAM(s) Oral every 8 hours  lactated ringers. 1000 milliLiter(s) IV Continuous <Continuous>  melatonin 3 milliGRAM(s) Oral at bedtime  NIFEdipine XL 90 milliGRAM(s) Oral daily  senna 2 Tablet(s) Oral at bedtime  sertraline 100 milliGRAM(s) Oral daily  tamsulosin 0.4 milliGRAM(s) Oral at bedtime    PRN MEDICATIONS  acetaminophen   Tablet. 650 milliGRAM(s) Oral every 6 hours PRN  morphine  - Injectable 2 milliGRAM(s) IV Push every 4 hours PRN  oxyCODONE    IR 5 milliGRAM(s) Oral every 4 hours PRN  oxyCODONE    IR 10 milliGRAM(s) Oral every 4 hours PRN    VITALS:   T(F): 97.9  HR: 83  BP: 123/59  RR: 20  SpO2: 93%    LABS:                        10.1   7.53  )-----------( 283      ( 02 Jul 2018 06:22 )             31.6     07-02    140  |  101  |  9<L>  ----------------------------<  99  4.5   |  28  |  <0.5<L>    Ca    8.7      02 Jul 2018 06:22                        PHYSICAL EXAM:    GEN: No acute distress, pt is AAO*3  LUNGS: Clear to auscultation bilaterally   HEART: S1/S2 present.   ABD: Soft, non-tender, non-distended.  EXT: limited rt LL ROM due to pain, no LL edema

## 2018-07-02 NOTE — PROGRESS NOTE ADULT - ASSESSMENT
81 yo F with Past Medical history of HTN, DLD, recent hemorrhagic stroke, initially with right hemiparesis, now largely resolved after intensive therapy, presented after a fall resulting in right femoral neck fracture. She is now POD # 4 for CRPP.    1. Right femoral neck fracture s/p CRPP (POD#4)  Ortho f/u appreciated  pain control  physical therapy  DVT prophylaxis  STR at SNF this week    2. Hypoxia - pt was on IVF and CXR today with some congestion/effusions  IVF stopped and will consider diuretic therapy  incentive spirometry  O2 via NC for now  check venous duplex of LE - although doubt PE as cause of hypoxia  monitor pulse ox    3. Urinary retention with [place]  voiding trial when ambulating more  outpt  f/u    4. Recent hemorrhagic CVA - pt with improved right hemiparesis  continue physical therapy    5. HTN - continue current management    6. Diarrhea - hold stool softener and laxative and monitor

## 2018-07-02 NOTE — OCCUPATIONAL THERAPY INITIAL EVALUATION ADULT - ADDITIONAL COMMENTS
Pt needs assistance with ADLS and transfer since stroke 4/2018. Prior to stroke pt was independent with ADLS and transfers.

## 2018-07-02 NOTE — PROGRESS NOTE ADULT - SUBJECTIVE AND OBJECTIVE BOX
ORTHO PROGRESS NOTE       82yFemale POD # 4     S/P right hip crpp    Patient seen and examined at bedside . The patient is awake and alert in NAD. No complaints of chest pain, SOB, N/V.    Vital Signs Last 24 Hrs  T(C): 35.6 (02 Jul 2018 04:49), Max: 36.3 (01 Jul 2018 12:22)  T(F): 96.1 (02 Jul 2018 04:49), Max: 97.4 (01 Jul 2018 12:22)  HR: 89 (02 Jul 2018 04:49) (72 - 89)  BP: 149/65 (02 Jul 2018 07:36) (149/65 - 191/86)  BP(mean): --  RR: 20 (02 Jul 2018 04:49) (18 - 20)  SpO2: 93% (01 Jul 2018 20:12) (90% - 93%)                          9.3    6.34  )-----------( 253      ( 01 Jul 2018 09:31 )             28.3     07-01    143  |  105  |  10  ----------------------------<  169<H>  3.5   |  26  |  0.5<L>    Ca    8.3<L>      01 Jul 2018 09:31            DVT ppx : sqh    MEDICATIONS  (STANDING):  atorvastatin 40 milliGRAM(s) Oral at bedtime  docusate sodium 100 milliGRAM(s) Oral three times a day  enalapril 2.5 milliGRAM(s) Oral daily  heparin  Injectable 5000 Unit(s) SubCutaneous every 8 hours  labetalol 200 milliGRAM(s) Oral every 8 hours  lactated ringers. 1000 milliLiter(s) (75 mL/Hr) IV Continuous <Continuous>  melatonin 3 milliGRAM(s) Oral at bedtime  NIFEdipine XL 90 milliGRAM(s) Oral daily  senna 2 Tablet(s) Oral at bedtime  sertraline 100 milliGRAM(s) Oral daily  tamsulosin 0.4 milliGRAM(s) Oral at bedtime    MEDICATIONS  (PRN):  acetaminophen   Tablet. 650 milliGRAM(s) Oral every 6 hours PRN Mild Pain (1 - 3)  morphine  - Injectable 2 milliGRAM(s) IV Push every 4 hours PRN for severe breakthrough pain not responding to oral meds; give oral meds first and wait 30 minutes, if pain still severe (7-10) then may give morphine  oxyCODONE    IR 5 milliGRAM(s) Oral every 4 hours PRN Moderate Pain (4 - 6)  oxyCODONE    IR 10 milliGRAM(s) Oral every 4 hours PRN Severe Pain (7 - 10)      PE:   right hip incision  C/D/I          Compartments soft         NVI, SILT           A/P: 82y female POD # 4   S/P right hip crpp , doing well.            OOB to Chair            Physical Therapy           Pain control            Incentive Spirometry            DVT Prophylaxis            Discharge planning -ortho stable for discharge, recall prn . follow up as outpt with dr mejia [phone]

## 2018-07-02 NOTE — PROGRESS NOTE ADULT - SUBJECTIVE AND OBJECTIVE BOX
ANAYA EMILIANO  82y Female    INTERVAL HPI/OVERNIGHT EVENTS:    Pt seen earlier today with her daughters at bedside. They state that she had diarrhea for 2 days.   Pt feels OK and denies SOB.   Has required O2 since admission - 2L NC  pulse ox on room air 88%    T(F): 97.4 (07-02-18 @ 21:04), Max: 97.9 (07-02-18 @ 13:19)  HR: 72 (07-02-18 @ 21:04) (72 - 89)  BP: 120/59 (07-02-18 @ 21:04) (120/59 - 191/86)  RR: 20 (07-02-18 @ 21:04) (20 - 20)  SpO2: 92% on O2 NC 2L    I&O's Summary    01 Jul 2018 07:01  -  02 Jul 2018 07:00  --------------------------------------------------------  IN: 0 mL / OUT: 1465 mL / NET: -1465 mL    02 Jul 2018 07:01  -  02 Jul 2018 22:39  --------------------------------------------------------  IN: 180 mL / OUT: 600 mL / NET: -420 mL        PHYSICAL EXAM:  GENERAL: NAD  HEAD:  Normocephalic  EYES:  conjunctiva and sclera clear  ENMT: Moist mucous membranes  NECK: Supple  NERVOUS SYSTEM:  Alert & Oriented X3, Good concentration  improved movement of right arm and leg from last admission when she had CVA  CHEST/LUNG: Clear to percussion bilaterally  HEART: Regular rate and rhythm  ABDOMEN: Soft, Nontender, Nondistended; Bowel sounds present  EXTREMITIES:   No edema  Right thigh with dressing in place      Consultant(s) Notes Reviewed:  [x ] YES  [ ] NO  Care Discussed with Consultants/Other Providers [ x] YES  [ ] NO    MEDICATIONS  (STANDING):  atorvastatin 40 milliGRAM(s) Oral at bedtime  docusate sodium 100 milliGRAM(s) Oral three times a day  enalapril 2.5 milliGRAM(s) Oral daily  heparin  Injectable 5000 Unit(s) SubCutaneous every 8 hours  labetalol 200 milliGRAM(s) Oral every 8 hours  melatonin 3 milliGRAM(s) Oral at bedtime  NIFEdipine XL 90 milliGRAM(s) Oral daily  senna 2 Tablet(s) Oral at bedtime  sertraline 100 milliGRAM(s) Oral daily  tamsulosin 0.4 milliGRAM(s) Oral at bedtime    MEDICATIONS  (PRN):  acetaminophen   Tablet. 650 milliGRAM(s) Oral every 6 hours PRN Mild Pain (1 - 3)  morphine  - Injectable 2 milliGRAM(s) IV Push every 4 hours PRN for severe breakthrough pain not responding to oral meds; give oral meds first and wait 30 minutes, if pain still severe (7-10) then may give morphine  oxyCODONE    IR 5 milliGRAM(s) Oral every 4 hours PRN Moderate Pain (4 - 6)  oxyCODONE    IR 10 milliGRAM(s) Oral every 4 hours PRN Severe Pain (7 - 10)      LABS:                        10.1   7.53  )-----------( 283      ( 02 Jul 2018 06:22 )             31.6     07-02    140  |  101  |  9<L>  ----------------------------<  99  4.5   |  28  |  <0.5<L>    Ca    8.7      02 Jul 2018 06:22            RADIOLOGY & ADDITIONAL TESTS:    Imaging or report Personally Reviewed:  [ x] YES  [ ] NO    < from: Xray Chest 1 View AP/PA (07.02.18 @ 14:46) >  Impression:      Bilateral pleural effusion/opacities with background emphysematous change.    < end of copied text >        Case discussed with resident    Care discussed with pt/family

## 2018-07-03 ENCOUNTER — TRANSCRIPTION ENCOUNTER (OUTPATIENT)
Age: 83
End: 2018-07-03

## 2018-07-03 LAB
ANION GAP SERPL CALC-SCNC: 16 MMOL/L — HIGH (ref 7–14)
BASOPHILS # BLD AUTO: 0.02 K/UL — SIGNIFICANT CHANGE UP (ref 0–0.2)
BASOPHILS NFR BLD AUTO: 0.2 % — SIGNIFICANT CHANGE UP (ref 0–1)
BUN SERPL-MCNC: 11 MG/DL — SIGNIFICANT CHANGE UP (ref 10–20)
CALCIUM SERPL-MCNC: 8.7 MG/DL — SIGNIFICANT CHANGE UP (ref 8.5–10.1)
CHLORIDE SERPL-SCNC: 104 MMOL/L — SIGNIFICANT CHANGE UP (ref 98–110)
CO2 SERPL-SCNC: 24 MMOL/L — SIGNIFICANT CHANGE UP (ref 17–32)
CREAT SERPL-MCNC: <0.5 MG/DL — LOW (ref 0.7–1.5)
EOSINOPHIL # BLD AUTO: 1.9 K/UL — HIGH (ref 0–0.7)
EOSINOPHIL NFR BLD AUTO: 23.6 % — HIGH (ref 0–8)
GLUCOSE SERPL-MCNC: 111 MG/DL — HIGH (ref 70–99)
HCT VFR BLD CALC: 32 % — LOW (ref 37–47)
HGB BLD-MCNC: 10.3 G/DL — LOW (ref 12–16)
IMM GRANULOCYTES NFR BLD AUTO: 1.1 % — HIGH (ref 0.1–0.3)
LYMPHOCYTES # BLD AUTO: 0.75 K/UL — LOW (ref 1.2–3.4)
LYMPHOCYTES # BLD AUTO: 9.3 % — LOW (ref 20.5–51.1)
MCHC RBC-ENTMCNC: 26.3 PG — LOW (ref 27–31)
MCHC RBC-ENTMCNC: 32.2 G/DL — SIGNIFICANT CHANGE UP (ref 32–37)
MCV RBC AUTO: 81.6 FL — SIGNIFICANT CHANGE UP (ref 81–99)
MONOCYTES # BLD AUTO: 0.07 K/UL — LOW (ref 0.1–0.6)
MONOCYTES NFR BLD AUTO: 0.9 % — LOW (ref 1.7–9.3)
NEUTROPHILS # BLD AUTO: 5.23 K/UL — SIGNIFICANT CHANGE UP (ref 1.4–6.5)
NEUTROPHILS NFR BLD AUTO: 64.9 % — SIGNIFICANT CHANGE UP (ref 42.2–75.2)
NRBC # BLD: 0 /100 WBCS — SIGNIFICANT CHANGE UP (ref 0–0)
PLATELET # BLD AUTO: 328 K/UL — SIGNIFICANT CHANGE UP (ref 130–400)
POTASSIUM SERPL-MCNC: 4 MMOL/L — SIGNIFICANT CHANGE UP (ref 3.5–5)
POTASSIUM SERPL-SCNC: 4 MMOL/L — SIGNIFICANT CHANGE UP (ref 3.5–5)
RBC # BLD: 3.92 M/UL — LOW (ref 4.2–5.4)
RBC # FLD: 13.4 % — SIGNIFICANT CHANGE UP (ref 11.5–14.5)
SODIUM SERPL-SCNC: 144 MMOL/L — SIGNIFICANT CHANGE UP (ref 135–146)
WBC # BLD: 8.06 K/UL — SIGNIFICANT CHANGE UP (ref 4.8–10.8)
WBC # FLD AUTO: 8.06 K/UL — SIGNIFICANT CHANGE UP (ref 4.8–10.8)

## 2018-07-03 RX ORDER — LANOLIN ALCOHOL/MO/W.PET/CERES
1 CREAM (GRAM) TOPICAL
Qty: 10 | Refills: 0
Start: 2018-07-03 | End: 2018-07-12

## 2018-07-03 RX ORDER — SPIRONOLACTONE 25 MG/1
50 TABLET, FILM COATED ORAL DAILY
Qty: 0 | Refills: 0 | Status: DISCONTINUED | OUTPATIENT
Start: 2018-07-03 | End: 2018-07-04

## 2018-07-03 RX ORDER — SPIRONOLACTONE 25 MG/1
2 TABLET, FILM COATED ORAL
Qty: 10 | Refills: 0
Start: 2018-07-03 | End: 2018-07-07

## 2018-07-03 RX ORDER — ACETAMINOPHEN 500 MG
2 TABLET ORAL
Qty: 0 | Refills: 0 | DISCHARGE
Start: 2018-07-03

## 2018-07-03 RX ADMIN — ATORVASTATIN CALCIUM 40 MILLIGRAM(S): 80 TABLET, FILM COATED ORAL at 21:21

## 2018-07-03 RX ADMIN — TAMSULOSIN HYDROCHLORIDE 0.4 MILLIGRAM(S): 0.4 CAPSULE ORAL at 21:22

## 2018-07-03 RX ADMIN — HEPARIN SODIUM 5000 UNIT(S): 5000 INJECTION INTRAVENOUS; SUBCUTANEOUS at 21:21

## 2018-07-03 RX ADMIN — Medication 200 MILLIGRAM(S): at 06:37

## 2018-07-03 RX ADMIN — Medication 90 MILLIGRAM(S): at 06:37

## 2018-07-03 RX ADMIN — Medication 3 MILLIGRAM(S): at 21:22

## 2018-07-03 RX ADMIN — Medication 2.5 MILLIGRAM(S): at 06:39

## 2018-07-03 RX ADMIN — HEPARIN SODIUM 5000 UNIT(S): 5000 INJECTION INTRAVENOUS; SUBCUTANEOUS at 14:32

## 2018-07-03 RX ADMIN — HEPARIN SODIUM 5000 UNIT(S): 5000 INJECTION INTRAVENOUS; SUBCUTANEOUS at 06:40

## 2018-07-03 RX ADMIN — Medication 1000 MILLIGRAM(S): at 07:39

## 2018-07-03 RX ADMIN — SPIRONOLACTONE 50 MILLIGRAM(S): 25 TABLET, FILM COATED ORAL at 12:32

## 2018-07-03 RX ADMIN — SERTRALINE 100 MILLIGRAM(S): 25 TABLET, FILM COATED ORAL at 12:32

## 2018-07-03 RX ADMIN — Medication 200 MILLIGRAM(S): at 21:22

## 2018-07-03 RX ADMIN — Medication 200 MILLIGRAM(S): at 14:31

## 2018-07-03 NOTE — DISCHARGE NOTE ADULT - HOSPITAL COURSE
81 yo F with Past Medical history of HTN, DLD, recent hemorrhagic stroke, iED for assessment of Right hip pain s/p fall. Initially patient was pain free then had excruciating pain and was unable to stand or attempt ambulation. CT pelvis showed acute impacted rt femoral neck fracture. ortho was consulted, patient had CRPP on (6/28) after being cleared by pullm. post surgery course was complicated by lung congestion secondary to IV fluids. patient on O2 per NC to keep SaO2 > 92%. IV fluids stopped. patient placed on aldactone 50 mg tab PO q 24 hr. patient is clinically stable and cleared to be discharge per ortho and Medicine. patient to follow up with ortho to assess for healing. patient to follow up with urology for voiding trial and removal of [place]. patient to follow up with PMDD>

## 2018-07-03 NOTE — PROGRESS NOTE ADULT - SUBJECTIVE AND OBJECTIVE BOX
SUBJECTIVE:    Patient is a 82y old Female who presents with a chief complaint of Right Hip Pain (27 Jun 2018 05:50)    Currently admitted to medicine with the primary diagnosis of Femoral neck fracture     Today is hospital day 6d. This morning she is resting comfortably in bed and reports no new issues or overnight events.     PAST MEDICAL & SURGICAL HISTORY  HLD (hyperlipidemia)  No significant past surgical history    SOCIAL HISTORY:  Negative for smoking/alcohol/drug use.     ALLERGIES:  Celebrex (Other (Moderate))  sulfonamides (Unknown)    MEDICATIONS:  STANDING MEDICATIONS  atorvastatin 40 milliGRAM(s) Oral at bedtime  docusate sodium 100 milliGRAM(s) Oral three times a day  enalapril 2.5 milliGRAM(s) Oral daily  heparin  Injectable 5000 Unit(s) SubCutaneous every 8 hours  labetalol 200 milliGRAM(s) Oral every 8 hours  melatonin 3 milliGRAM(s) Oral at bedtime  NIFEdipine XL 90 milliGRAM(s) Oral daily  senna 2 Tablet(s) Oral at bedtime  sertraline 100 milliGRAM(s) Oral daily  spironolactone 50 milliGRAM(s) Oral daily  tamsulosin 0.4 milliGRAM(s) Oral at bedtime    PRN MEDICATIONS  acetaminophen   Tablet. 650 milliGRAM(s) Oral every 6 hours PRN  morphine  - Injectable 2 milliGRAM(s) IV Push every 4 hours PRN  oxyCODONE    IR 5 milliGRAM(s) Oral every 4 hours PRN  oxyCODONE    IR 10 milliGRAM(s) Oral every 4 hours PRN    VITALS:   T(F): 97.4  HR: 77  BP: 139/61  RR: 19  SpO2: --    LABS:                        10.3   8.06  )-----------( 328      ( 03 Jul 2018 07:47 )             32.0     07-03    144  |  104  |  11  ----------------------------<  111<H>  4.0   |  24  |  <0.5<L>    Ca    8.7      03 Jul 2018 07:47                    RADIOLOGY:    < from: Xray Chest 1 View AP/PA (07.02.18 @ 14:46) >  Impression:      Bilateral pleural effusion/opacities with background emphysematous change.          < end of copied text >      PHYSICAL EXAM:    GEN: No acute distress, pt is AAO*3  LUNGS: Clear to auscultation bilaterally   HEART: S1/S2 present.   ABD: Soft, non-tender, non-distended.  EXT: limited rt LL ROM due to pain, no LL edema

## 2018-07-03 NOTE — DISCHARGE NOTE ADULT - PLAN OF CARE
allow optimal healing pt to follow up with outpatient ortho doctor in a week. remove gonzales patient to follow up with outpatient urology for trial of voiding and removal of [place]

## 2018-07-03 NOTE — DISCHARGE NOTE ADULT - MEDICATION SUMMARY - MEDICATIONS TO TAKE
I will START or STAY ON the medications listed below when I get home from the hospital:    spironolactone 25 mg oral tablet  -- 2 tab(s) by mouth once a day  -- Indication: For HTN    acetaminophen 325 mg oral tablet  -- 2 tab(s) by mouth every 6 hours, As needed, Mild Pain (1 - 3)  -- Indication: For pain control    enalapril 2.5 mg oral tablet  -- 1 tab(s) by mouth once a day at noon  -- Indication: For HTN    tamsulosin 0.4 mg oral capsule  -- 2 cap(s) by mouth once a day (at bedtime)  -- Indication: For HTN    sertraline 100 mg oral tablet  -- 1 tab(s) by mouth once a day  -- Indication: For depression     atorvastatin 40 mg oral tablet  -- 1 tab(s) by mouth once a day (at bedtime)  -- Indication: For dyslipidemia    labetalol 200 mg oral tablet  -- 1 tab(s) by mouth every 8 hours  -- Indication: For HTN    NIFEdipine 90 mg oral tablet, extended release  -- 1 tab(s) by mouth once a day  -- Indication: For HTN    docusate sodium 100 mg oral capsule  -- 1 cap(s) by mouth once a day  -- Indication: For stool softner    polyethylene glycol 3350 oral powder for reconstitution  -- 17 gram(s) by mouth every 12 hours  -- Indication: For constipation    senna oral tablet  -- 2 tab(s) by mouth once a day (at bedtime)  -- Indication: For constipation    melatonin 3 mg oral tablet  -- 1 tab(s) by mouth once a day (at bedtime)  -- Indication: For insomnia    ocular lubricant ophthalmic solution  -- 1 drop(s) to right eye every 6 hours while awake  -- Indication: For eye dryness

## 2018-07-03 NOTE — PROGRESS NOTE ADULT - SUBJECTIVE AND OBJECTIVE BOX
EMILIANO ANAYA  82y Female    INTERVAL HPI/OVERNIGHT EVENTS:    Pt feels well. Daughter at bedside. No diarrhea. Daughter thinks pt is SOB with exertion.  No SOB or cough.   Pt seen with resident at bedside.    T(F): 96.7 (07-03-18 @ 05:33), Max: 97.9 (07-02-18 @ 13:19)  HR: 95 (07-03-18 @ 05:33) (72 - 95)  BP: 136/67 (07-03-18 @ 05:33) (120/59 - 136/67)  RR: 18 (07-03-18 @ 05:33) (18 - 20)  SpO2: --  I&O's Summary    02 Jul 2018 07:01  -  03 Jul 2018 07:00  --------------------------------------------------------  IN: 180 mL / OUT: 1080 mL / NET: -900 mL    03 Jul 2018 07:01  -  03 Jul 2018 12:51  --------------------------------------------------------  IN: 0 mL / OUT: 300 mL / NET: -300 mL      PHYSICAL EXAM:  GENERAL: NAD  HEAD:  Normocephalic  EYES:  conjunctiva and sclera clear  ENMT: Moist mucous membranes  NECK: Supple  NERVOUS SYSTEM:  Alert & Oriented X3, Good concentration  CHEST/LUNG: crackles at bases, otherwise CTA  HEART: Regular rate and rhythm  ABDOMEN: Soft, Nontender, Nondistended; Bowel sounds present  EXTREMITIES:   No edema  SKIN: No rashes   Right LE with dressing in place    Consultant(s) Notes Reviewed:  [x ] YES  [ ] NO  Care Discussed with Consultants/Other Providers [ x] YES  [ ] NO    MEDICATIONS  (STANDING):  atorvastatin 40 milliGRAM(s) Oral at bedtime  docusate sodium 100 milliGRAM(s) Oral three times a day  enalapril 2.5 milliGRAM(s) Oral daily  heparin  Injectable 5000 Unit(s) SubCutaneous every 8 hours  labetalol 200 milliGRAM(s) Oral every 8 hours  melatonin 3 milliGRAM(s) Oral at bedtime  NIFEdipine XL 90 milliGRAM(s) Oral daily  senna 2 Tablet(s) Oral at bedtime  sertraline 100 milliGRAM(s) Oral daily  spironolactone 50 milliGRAM(s) Oral daily  tamsulosin 0.4 milliGRAM(s) Oral at bedtime    MEDICATIONS  (PRN):  acetaminophen   Tablet. 650 milliGRAM(s) Oral every 6 hours PRN Mild Pain (1 - 3)  morphine  - Injectable 2 milliGRAM(s) IV Push every 4 hours PRN for severe breakthrough pain not responding to oral meds; give oral meds first and wait 30 minutes, if pain still severe (7-10) then may give morphine  oxyCODONE    IR 5 milliGRAM(s) Oral every 4 hours PRN Moderate Pain (4 - 6)  oxyCODONE    IR 10 milliGRAM(s) Oral every 4 hours PRN Severe Pain (7 - 10)      LABS:                        10.3   8.06  )-----------( 328      ( 03 Jul 2018 07:47 )             32.0     07-03    144  |  104  |  11  ----------------------------<  111<H>  4.0   |  24  |  <0.5<L>    Ca    8.7      03 Jul 2018 07:47              Case discussed with resident    Care discussed with pt and daughter at bedside - all questions answered.

## 2018-07-03 NOTE — PROGRESS NOTE ADULT - ASSESSMENT
81 yo F with Past Medical history of HTN, DLD, recent hemorrhagic stroke, initially with R hemiparesis, now largely resolved after intensive therapy, presents to ED for assessment of Right hip pain s/p fall found to have right femoral neck hip fracture      patient to receive 2L/min O2 NC.  repeated CXR: bilateral opacities and pleural effusion.  pt placed on aldactone 50 mg po q24  d/c IV fluids   follow up K level  anticipate to d/c tomorrow    1-Right femoral neck hip fracture  :     s/p CRPP  per ortho: the patient is cleared to be discharge  pain control w/ tylenol, oxycodone, morphine prn  bowel regimen w/ senna and colace    2- Urinary retention w/ chronic Cleveland:    c/w tamsulosin  patient to be discharge on Cleveland and undergo TOV as outpatient    3- Hypertension:     controlled  CCB and ACE-i and BB    4- DVT, GI PPX

## 2018-07-03 NOTE — PROGRESS NOTE ADULT - ASSESSMENT
83 yo F with Past Medical history of HTN, DLD, recent hemorrhagic stroke, initially with right hemiparesis, now largely resolved after intensive therapy, presented after a fall resulting in right femoral neck fracture. She is now POD # 5 for CRPP.    1. Right femoral neck fracture s/p CRPP (POD#5)  Ortho f/u as outpt  pain control  physical therapy  DVT prophylaxis  STR at [facility] tomorrow if respiratory status improves    2. Hypoxia - pt was on IVF and CXR yesterday showed congestion/effusions  IVF stopped   spironolactone started (pt with sulfa allergy - has a rash and was never on lasix before)  incentive spirometry  O2 via NC for now  check venous duplex of LE - although doubt PE as cause of hypoxia  monitor pulse ox    3. Urinary retention with Cleveland  voiding trial when ambulating more  outpt  f/u    4. Recent hemorrhagic CVA - pt with improved right hemiparesis  continue physical therapy    5. HTN - continue current management    6. Diarrhea - now resolved    7. Disposition - STR at [facility] on 7/4 if improved with diuresis  check BMP in AM

## 2018-07-03 NOTE — DISCHARGE NOTE ADULT - PATIENT PORTAL LINK FT
You can access the [facility] Patient Portal, offered by [facility], by registering with the following website: [email or web address]

## 2018-07-03 NOTE — DISCHARGE NOTE ADULT - CARE PLAN
Principal Discharge DX:	Femoral neck fracture  Goal:	allow optimal healing  Assessment and plan of treatment:	pt to follow up with outpatient ortho doctor in a week.  Secondary Diagnosis:	Urinary retention  Goal:	remove gonzales  Assessment and plan of treatment:	patient to follow up with outpatient urology for trial of voiding and removal of Gonzales

## 2018-07-03 NOTE — DISCHARGE NOTE ADULT - MEDICATION SUMMARY - MEDICATIONS TO STOP TAKING
I will STOP taking the medications listed below when I get home from the hospital:    heparin 5000 units/mL injectable solution  -- 5000 unit(s) injectable every 8 hours

## 2018-07-04 VITALS
TEMPERATURE: 98 F | SYSTOLIC BLOOD PRESSURE: 111 MMHG | HEART RATE: 69 BPM | RESPIRATION RATE: 18 BRPM | DIASTOLIC BLOOD PRESSURE: 53 MMHG

## 2018-07-04 LAB
ANION GAP SERPL CALC-SCNC: 11 MMOL/L — SIGNIFICANT CHANGE UP (ref 7–14)
BASOPHILS # BLD AUTO: 0.03 K/UL — SIGNIFICANT CHANGE UP (ref 0–0.2)
BASOPHILS NFR BLD AUTO: 0.4 % — SIGNIFICANT CHANGE UP (ref 0–1)
BUN SERPL-MCNC: 15 MG/DL — SIGNIFICANT CHANGE UP (ref 10–20)
CALCIUM SERPL-MCNC: 8.7 MG/DL — SIGNIFICANT CHANGE UP (ref 8.5–10.1)
CHLORIDE SERPL-SCNC: 105 MMOL/L — SIGNIFICANT CHANGE UP (ref 98–110)
CO2 SERPL-SCNC: 27 MMOL/L — SIGNIFICANT CHANGE UP (ref 17–32)
CREAT SERPL-MCNC: 0.5 MG/DL — LOW (ref 0.7–1.5)
EOSINOPHIL # BLD AUTO: 2.27 K/UL — HIGH (ref 0–0.7)
EOSINOPHIL NFR BLD AUTO: 28.4 % — HIGH (ref 0–8)
GLUCOSE SERPL-MCNC: 111 MG/DL — HIGH (ref 70–99)
HCT VFR BLD CALC: 30.4 % — LOW (ref 37–47)
HGB BLD-MCNC: 9.8 G/DL — LOW (ref 12–16)
IMM GRANULOCYTES NFR BLD AUTO: 1.3 % — HIGH (ref 0.1–0.3)
LYMPHOCYTES # BLD AUTO: 0.88 K/UL — LOW (ref 1.2–3.4)
LYMPHOCYTES # BLD AUTO: 11 % — LOW (ref 20.5–51.1)
MCHC RBC-ENTMCNC: 26.5 PG — LOW (ref 27–31)
MCHC RBC-ENTMCNC: 32.2 G/DL — SIGNIFICANT CHANGE UP (ref 32–37)
MCV RBC AUTO: 82.2 FL — SIGNIFICANT CHANGE UP (ref 81–99)
MONOCYTES # BLD AUTO: 0.34 K/UL — SIGNIFICANT CHANGE UP (ref 0.1–0.6)
MONOCYTES NFR BLD AUTO: 4.3 % — SIGNIFICANT CHANGE UP (ref 1.7–9.3)
NEUTROPHILS # BLD AUTO: 4.36 K/UL — SIGNIFICANT CHANGE UP (ref 1.4–6.5)
NEUTROPHILS NFR BLD AUTO: 54.6 % — SIGNIFICANT CHANGE UP (ref 42.2–75.2)
NRBC # BLD: 0 /100 WBCS — SIGNIFICANT CHANGE UP (ref 0–0)
PLATELET # BLD AUTO: 326 K/UL — SIGNIFICANT CHANGE UP (ref 130–400)
POTASSIUM SERPL-MCNC: 4 MMOL/L — SIGNIFICANT CHANGE UP (ref 3.5–5)
POTASSIUM SERPL-SCNC: 4 MMOL/L — SIGNIFICANT CHANGE UP (ref 3.5–5)
RBC # BLD: 3.7 M/UL — LOW (ref 4.2–5.4)
RBC # FLD: 13.7 % — SIGNIFICANT CHANGE UP (ref 11.5–14.5)
SODIUM SERPL-SCNC: 143 MMOL/L — SIGNIFICANT CHANGE UP (ref 135–146)
WBC # BLD: 7.98 K/UL — SIGNIFICANT CHANGE UP (ref 4.8–10.8)
WBC # FLD AUTO: 7.98 K/UL — SIGNIFICANT CHANGE UP (ref 4.8–10.8)

## 2018-07-04 RX ADMIN — Medication 200 MILLIGRAM(S): at 05:44

## 2018-07-04 RX ADMIN — Medication 90 MILLIGRAM(S): at 05:44

## 2018-07-04 RX ADMIN — Medication 100 MILLIGRAM(S): at 12:16

## 2018-07-04 RX ADMIN — Medication 2.5 MILLIGRAM(S): at 05:44

## 2018-07-04 RX ADMIN — Medication 100 MILLIGRAM(S): at 05:44

## 2018-07-04 RX ADMIN — HEPARIN SODIUM 5000 UNIT(S): 5000 INJECTION INTRAVENOUS; SUBCUTANEOUS at 05:45

## 2018-07-04 RX ADMIN — SPIRONOLACTONE 50 MILLIGRAM(S): 25 TABLET, FILM COATED ORAL at 05:44

## 2018-07-04 RX ADMIN — Medication 200 MILLIGRAM(S): at 12:16

## 2018-07-04 RX ADMIN — SERTRALINE 100 MILLIGRAM(S): 25 TABLET, FILM COATED ORAL at 12:16

## 2018-07-04 NOTE — PROGRESS NOTE ADULT - PROVIDER SPECIALTY LIST ADULT
Hospitalist
Internal Medicine
Orthopedics
Internal Medicine
Orthopedics
Hospitalist
Internal Medicine

## 2018-07-04 NOTE — PROGRESS NOTE ADULT - SUBJECTIVE AND OBJECTIVE BOX
Chief Complaint:  Patient is a 82y old  Female who presents with a chief complaint of Right Hip Pain (03 Jul 2018 16:40)      Interval Events:     Allergies:  Celebrex (Other (Moderate))  sulfonamides (Unknown)      Home Medications:    Hospital Medications:  acetaminophen   Tablet. 650 milliGRAM(s) Oral every 6 hours PRN  atorvastatin 40 milliGRAM(s) Oral at bedtime  docusate sodium 100 milliGRAM(s) Oral three times a day  enalapril 2.5 milliGRAM(s) Oral daily  heparin  Injectable 5000 Unit(s) SubCutaneous every 8 hours  labetalol 200 milliGRAM(s) Oral every 8 hours  melatonin 3 milliGRAM(s) Oral at bedtime  morphine  - Injectable 2 milliGRAM(s) IV Push every 4 hours PRN  NIFEdipine XL 90 milliGRAM(s) Oral daily  oxyCODONE    IR 5 milliGRAM(s) Oral every 4 hours PRN  oxyCODONE    IR 10 milliGRAM(s) Oral every 4 hours PRN  senna 2 Tablet(s) Oral at bedtime  sertraline 100 milliGRAM(s) Oral daily  spironolactone 50 milliGRAM(s) Oral daily  tamsulosin 0.4 milliGRAM(s) Oral at bedtime      PMHX/PSHX:  HLD (hyperlipidemia)  No significant past surgical history      Family history:  No pertinent family history in first degree relatives      ROS:   As mentioned below      PHYSICAL EXAM:   Vital Signs:  Vital Signs Last 24 Hrs  T(C): 35.9 (04 Jul 2018 05:42), Max: 37.2 (03 Jul 2018 20:14)  T(F): 96.6 (04 Jul 2018 05:42), Max: 98.9 (03 Jul 2018 20:14)  HR: 69 (04 Jul 2018 07:18) (69 - 97)  BP: 172/76 (04 Jul 2018 07:18) (139/61 - 193/81)  BP(mean): --  RR: 18 (04 Jul 2018 05:42) (18 - 19)  SpO2: --  Daily     Daily     GENERAL:  NAD  HEENT:  NC/AT,  No Thyromegaly  CHEST:  CTA B/L  HEART:  S1, S2- No M, R, G  ABDOMEN:  Soft, NT, ND  EXTEREMITIES:  no cyanosis,clubbing or edema  SKIN:  NAD  NEURO:  Grossly Nr.    LABS:                        9.8    7.98  )-----------( 326      ( 04 Jul 2018 07:24 )             30.4     07-04    143  |  105  |  15  ----------------------------<  111<H>  4.0   |  27  |  0.5<L>    Ca    8.7      04 Jul 2018 07:24                Imaging:

## 2018-07-04 NOTE — PROGRESS NOTE ADULT - ASSESSMENT
83 yo F with Past Medical history of HTN, DLD, recent hemorrhagic stroke, initially with R hemiparesis, now largely resolved after intensive therapy, presents to ED for assessment of Right hip pain s/p fall found to have right femoral neck hip fracture  1-Right femoral neck hip fracture  :   s/p CRPP  per ortho: the patient is cleared to be discharge  pain control w/ tylenol, oxycodone, morphine prn  2- Urinary retention w/ chronic Cleveland: c/w tamsulosin  3- Hypertension:  controlled  CCB and ACE-i and BB  4-DVT/GI Prophylaxis  Disposition: Medically Stable- May benefit Short-term rehab- Case management  Pager No. [phone]

## 2018-07-09 ENCOUNTER — OUTPATIENT (OUTPATIENT)
Dept: OUTPATIENT SERVICES | Facility: HOSPITAL | Age: 83
LOS: 1 days | Discharge: HOME | End: 2018-07-09

## 2018-07-09 DIAGNOSIS — E55.9 VITAMIN D DEFICIENCY, UNSPECIFIED: ICD-10-CM

## 2018-07-12 DIAGNOSIS — I69.251 HEMIPLEGIA AND HEMIPARESIS FOLLOWING OTHER NONTRAUMATIC INTRACRANIAL HEMORRHAGE AFFECTING RIGHT DOMINANT SIDE: ICD-10-CM

## 2018-07-12 DIAGNOSIS — S72.001A FRACTURE OF UNSPECIFIED PART OF NECK OF RIGHT FEMUR, INITIAL ENCOUNTER FOR CLOSED FRACTURE: ICD-10-CM
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
## 2018-07-12 DIAGNOSIS — R09.02 HYPOXEMIA: ICD-10-CM

## 2018-07-12 DIAGNOSIS — Z88.9 ALLERGY STATUS TO UNSPECIFIED DRUGS, MEDICAMENTS AND BIOLOGICAL SUBSTANCES: ICD-10-CM

## 2018-07-12 DIAGNOSIS — Z88.2 ALLERGY STATUS TO SULFONAMIDES: ICD-10-CM

## 2018-07-12 DIAGNOSIS — M25.551 PAIN IN RIGHT HIP: ICD-10-CM

## 2018-07-12 DIAGNOSIS — R33.9 RETENTION OF URINE, UNSPECIFIED: ICD-10-CM

## 2018-07-12 DIAGNOSIS — I10 ESSENTIAL (PRIMARY) HYPERTENSION: ICD-10-CM

## 2018-07-12 DIAGNOSIS — J44.9 CHRONIC OBSTRUCTIVE PULMONARY DISEASE, UNSPECIFIED: ICD-10-CM
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
## 2018-07-14 ENCOUNTER — OUTPATIENT (OUTPATIENT)
Dept: OUTPATIENT SERVICES | Facility: HOSPITAL | Age: 83
LOS: 1 days | Discharge: HOME | End: 2018-07-14

## 2018-07-16 DIAGNOSIS — N39.0 URINARY TRACT INFECTION, SITE NOT SPECIFIED: ICD-10-CM

## 2018-08-08 ENCOUNTER — OUTPATIENT (OUTPATIENT)
Dept: OUTPATIENT SERVICES | Facility: HOSPITAL | Age: 83
LOS: 1 days | Discharge: HOME | End: 2018-08-08

## 2018-08-08 DIAGNOSIS — E55.9 VITAMIN D DEFICIENCY, UNSPECIFIED: ICD-10-CM

## 2018-08-08 PROBLEM — E78.5 HYPERLIPIDEMIA, UNSPECIFIED: Chronic | Status: ACTIVE | Noted: 2018-04-23

## 2018-08-12 ENCOUNTER — OUTPATIENT (OUTPATIENT)
Dept: OUTPATIENT SERVICES | Facility: HOSPITAL | Age: 83
LOS: 1 days | Discharge: HOME | End: 2018-08-12

## 2018-08-12 DIAGNOSIS — N39.0 URINARY TRACT INFECTION, SITE NOT SPECIFIED: ICD-10-CM

## 2018-11-21 ENCOUNTER — OUTPATIENT (OUTPATIENT)
Dept: OUTPATIENT SERVICES | Facility: HOSPITAL | Age: 83
LOS: 1 days | Discharge: HOME | End: 2018-11-21

## 2018-11-21 DIAGNOSIS — I69.228: ICD-10-CM

## 2018-11-21 DIAGNOSIS — G81.90 HEMIPLEGIA, UNSPECIFIED AFFECTING UNSPECIFIED SIDE: ICD-10-CM

## 2018-11-21 DIAGNOSIS — I69.020 APHASIA FOLLOWING NONTRAUMATIC SUBARACHNOID HEMORRHAGE: ICD-10-CM

## 2019-04-09 NOTE — PHYSICAL THERAPY INITIAL EVALUATION ADULT - IMPAIRED TRANSFERS: SIT/STAND, REHAB EVAL
on unit impaired motor control/impaired balance/decreased strength/impaired coordination/impaired postural control

## 2019-08-09 ENCOUNTER — EMERGENCY (EMERGENCY)
Facility: HOSPITAL | Age: 84
LOS: 0 days | Discharge: HOME | End: 2019-08-10
Attending: EMERGENCY MEDICINE | Admitting: FAMILY MEDICINE
Payer: MEDICARE

## 2019-08-09 VITALS
HEART RATE: 68 BPM | OXYGEN SATURATION: 93 % | SYSTOLIC BLOOD PRESSURE: 128 MMHG | RESPIRATION RATE: 17 BRPM | DIASTOLIC BLOOD PRESSURE: 60 MMHG | TEMPERATURE: 99 F

## 2019-08-09 DIAGNOSIS — R42 DIZZINESS AND GIDDINESS: ICD-10-CM

## 2019-08-09 DIAGNOSIS — I10 ESSENTIAL (PRIMARY) HYPERTENSION: ICD-10-CM

## 2019-08-09 DIAGNOSIS — E78.5 HYPERLIPIDEMIA, UNSPECIFIED: ICD-10-CM

## 2019-08-09 DIAGNOSIS — Z87.891 PERSONAL HISTORY OF NICOTINE DEPENDENCE: ICD-10-CM

## 2019-08-09 DIAGNOSIS — H53.2 DIPLOPIA: ICD-10-CM

## 2019-08-09 DIAGNOSIS — I69.351 HEMIPLEGIA AND HEMIPARESIS FOLLOWING CEREBRAL INFARCTION AFFECTING RIGHT DOMINANT SIDE: ICD-10-CM

## 2019-08-09 LAB
ALBUMIN SERPL ELPH-MCNC: 4.6 G/DL — SIGNIFICANT CHANGE UP (ref 3.5–5.2)
ALP SERPL-CCNC: 67 U/L — SIGNIFICANT CHANGE UP (ref 30–115)
ALT FLD-CCNC: 10 U/L — SIGNIFICANT CHANGE UP (ref 0–41)
ANION GAP SERPL CALC-SCNC: 12 MMOL/L — SIGNIFICANT CHANGE UP (ref 7–14)
APTT BLD: 31.2 SEC — SIGNIFICANT CHANGE UP (ref 27–39.2)
AST SERPL-CCNC: 13 U/L — SIGNIFICANT CHANGE UP (ref 0–41)
BASOPHILS # BLD AUTO: 0.03 K/UL — SIGNIFICANT CHANGE UP (ref 0–0.2)
BASOPHILS NFR BLD AUTO: 0.4 % — SIGNIFICANT CHANGE UP (ref 0–1)
BILIRUB SERPL-MCNC: 0.2 MG/DL — SIGNIFICANT CHANGE UP (ref 0.2–1.2)
BUN SERPL-MCNC: 28 MG/DL — HIGH (ref 10–20)
CALCIUM SERPL-MCNC: 9.9 MG/DL — SIGNIFICANT CHANGE UP (ref 8.5–10.1)
CHLORIDE SERPL-SCNC: 106 MMOL/L — SIGNIFICANT CHANGE UP (ref 98–110)
CO2 SERPL-SCNC: 26 MMOL/L — SIGNIFICANT CHANGE UP (ref 17–32)
CREAT SERPL-MCNC: 0.9 MG/DL — SIGNIFICANT CHANGE UP (ref 0.7–1.5)
EOSINOPHIL # BLD AUTO: 0.17 K/UL — SIGNIFICANT CHANGE UP (ref 0–0.7)
EOSINOPHIL NFR BLD AUTO: 2.3 % — SIGNIFICANT CHANGE UP (ref 0–8)
GLUCOSE SERPL-MCNC: 90 MG/DL — SIGNIFICANT CHANGE UP (ref 70–99)
HCT VFR BLD CALC: 37.6 % — SIGNIFICANT CHANGE UP (ref 37–47)
HGB BLD-MCNC: 11.9 G/DL — LOW (ref 12–16)
IMM GRANULOCYTES NFR BLD AUTO: 0.7 % — HIGH (ref 0.1–0.3)
INR BLD: 1.01 RATIO — SIGNIFICANT CHANGE UP (ref 0.65–1.3)
LYMPHOCYTES # BLD AUTO: 1.06 K/UL — LOW (ref 1.2–3.4)
LYMPHOCYTES # BLD AUTO: 14.5 % — LOW (ref 20.5–51.1)
MCHC RBC-ENTMCNC: 26.4 PG — LOW (ref 27–31)
MCHC RBC-ENTMCNC: 31.6 G/DL — LOW (ref 32–37)
MCV RBC AUTO: 83.4 FL — SIGNIFICANT CHANGE UP (ref 81–99)
MONOCYTES # BLD AUTO: 0.45 K/UL — SIGNIFICANT CHANGE UP (ref 0.1–0.6)
MONOCYTES NFR BLD AUTO: 6.2 % — SIGNIFICANT CHANGE UP (ref 1.7–9.3)
NEUTROPHILS # BLD AUTO: 5.54 K/UL — SIGNIFICANT CHANGE UP (ref 1.4–6.5)
NEUTROPHILS NFR BLD AUTO: 75.9 % — HIGH (ref 42.2–75.2)
NRBC # BLD: 0 /100 WBCS — SIGNIFICANT CHANGE UP (ref 0–0)
PLATELET # BLD AUTO: 231 K/UL — SIGNIFICANT CHANGE UP (ref 130–400)
POTASSIUM SERPL-MCNC: 5.2 MMOL/L — HIGH (ref 3.5–5)
POTASSIUM SERPL-SCNC: 5.2 MMOL/L — HIGH (ref 3.5–5)
PROT SERPL-MCNC: 7.1 G/DL — SIGNIFICANT CHANGE UP (ref 6–8)
PROTHROM AB SERPL-ACNC: 11.6 SEC — SIGNIFICANT CHANGE UP (ref 9.95–12.87)
RBC # BLD: 4.51 M/UL — SIGNIFICANT CHANGE UP (ref 4.2–5.4)
RBC # FLD: 14.7 % — HIGH (ref 11.5–14.5)
SODIUM SERPL-SCNC: 144 MMOL/L — SIGNIFICANT CHANGE UP (ref 135–146)
WBC # BLD: 7.3 K/UL — SIGNIFICANT CHANGE UP (ref 4.8–10.8)
WBC # FLD AUTO: 7.3 K/UL — SIGNIFICANT CHANGE UP (ref 4.8–10.8)

## 2019-08-09 PROCEDURE — 99218: CPT | Mod: GC

## 2019-08-09 PROCEDURE — 93010 ELECTROCARDIOGRAM REPORT: CPT

## 2019-08-09 PROCEDURE — 70450 CT HEAD/BRAIN W/O DYE: CPT | Mod: 26

## 2019-08-09 RX ORDER — SPIRONOLACTONE 25 MG/1
50 TABLET, FILM COATED ORAL DAILY
Refills: 0 | Status: DISCONTINUED | OUTPATIENT
Start: 2019-08-09 | End: 2019-08-10

## 2019-08-09 RX ORDER — LABETALOL HCL 100 MG
200 TABLET ORAL EVERY 8 HOURS
Refills: 0 | Status: DISCONTINUED | OUTPATIENT
Start: 2019-08-09 | End: 2019-08-10

## 2019-08-09 RX ORDER — ATORVASTATIN CALCIUM 80 MG/1
40 TABLET, FILM COATED ORAL AT BEDTIME
Refills: 0 | Status: DISCONTINUED | OUTPATIENT
Start: 2019-08-09 | End: 2019-08-10

## 2019-08-09 NOTE — ED CDU PROVIDER INITIAL DAY NOTE - NS ED ROS FT
Constitutional: No fever, chills.  Eyes: see hpi  ENT: No hearing changes.  Neck: No neck pain or stiffness.  Cardiovascular: No chest pain, palpitations, edema.  Pulmonary: No SOB, cough. No hemoptysis.  Abdominal:  No nausea, vomiting, diarrhea.  : No dysuria, frequency.  Neuro: No headache, syncope, dizziness.  MS: No back pain. No calf pain/swelling.  Psych: No suicidal ideations.

## 2019-08-09 NOTE — ED ADULT NURSE NOTE - OBJECTIVE STATEMENT
Patient is a 84 yo female c/o double vision in her L eye for 20 minutes. No current complaints at this time. Denies headache, dizziness, blurred vision and N/V.

## 2019-08-09 NOTE — ED ADULT NURSE NOTE - INTERVENTIONS DEFINITIONS
Room bathroom lighting operational/Stretcher in lowest position, wheels locked, appropriate side rails in place/Monitor gait and stability/Call bell, personal items and telephone within reach/Instruct patient to call for assistance/Monitor for mental status changes and reorient to person, place, and time/[place] to call system/Non-slip footwear when patient is off stretcher/Physically safe environment: no spills, clutter or unnecessary equipment/Provide visual cue, wrist band, yellow gown, etc./Review medications for side effects contributing to fall risk

## 2019-08-09 NOTE — ED PROVIDER NOTE - NS ED ROS FT
Review of Systems:  CONSTITUTIONAL: No fever, No diaphoresis, No weight change  SKIN: No rash  HEMATOLOGIC: No abnormal bleeding or bruising  EYES: No eye pain, No blurred vision  ENT: No change in hearing, No sore throat, No neck pain, No rhinorrhea, No ear pain  RESPIRATORY: No shortness of breath, No cough  CARDIAC: No chest pain, No palpitations  GI: No abdominal pain, No nausea, No vomiting, No diarrhea, No constipation, No bright red blood per rectum or melena. No flank pain  : No dysuria, frequency, hematuria.   MUSCULOSKELETAL: No joint paint, No swelling, No back pain  NEUROLOGIC: No numbness, No focal weakness, No headache, No dizziness  All other systems negative, unless specified in HPI

## 2019-08-09 NOTE — ED ADULT TRIAGE NOTE - CHIEF COMPLAINT QUOTE
as per patient, seeing double vision 1 hour ago, hx of stroke back in April, no neuro deficit noted at this time, patient taken to critical care area.

## 2019-08-09 NOTE — ED CDU PROVIDER INITIAL DAY NOTE - ATTENDING CONTRIBUTION TO CARE
Pt report she had double vision which lasted 1/2 hour. Pt states symptoms resolved before arrival to the ED. NO headache, no numbness and no weakness anywhere. On exam S1S2 rrr, lungs clear, abdomen is soft non tender, no diplopia, neuro normal, visual field is normal. Placed into observation for echo and MRI. Ex smoker, + HTN, + elevated chol, - diabetes. Pt states she does not take spironolactone. This morning pt took her usual medications.

## 2019-08-09 NOTE — ED CDU PROVIDER INITIAL DAY NOTE - OBJECTIVE STATEMENT
83 yold female to Ed Pmhx Htn, Hld, Left parietal hemorrhagic cva 4/2018 with right hemiplegia present to Ed c/o double vision started this evening lasting 20 minutes and resolving; pt with brief episode of blurred vision that resolved; pt denies headache, n/v, new numbness, tingling weakness;   pt seen in main Ed ct head, labs wnl; case d/w with neurology attending DR. Menezes by Ed team who recommended obs monitoring and eval;

## 2019-08-09 NOTE — ED CDU PROVIDER INITIAL DAY NOTE - PHYSICAL EXAMINATION
Constitutional: Well developed, well nourished. NAD  Head: Normocephalic, atraumatic.  Eyes: PERRL, EOMI.   ENT: No nasal discharge. Mucous membranes dry.  Neck: Supple. Painless ROM.  Cardiovascular:  Regular rate and rhythm.   Pulmonary:  Lungs clear to auscultation bilaterally.   Abdominal: Soft. Nondistended. No rebound, guarding, rigidity.  Extremities. Pelvis stable. No lower extremity edema, symmetric calves.  Skin: No rashes, cyanosis.  Neuro: AAOx3 CII-XII intact; + right lower ext weakness(baseline)  Psych: Normal mood. Normal affect.

## 2019-08-09 NOTE — ED PROVIDER NOTE - PHYSICAL EXAMINATION
CONSTITUTIONAL: Well-developed; well-nourished; in no acute distress.   SKIN: warm, dry  HEAD: Normocephalic; atraumatic.  EYES: no conjunctival injection. PERRLA. EOMI. No nystagmus. 20/20 visual acuity in both eyes together and each eye separately.   ENT: No nasal discharge; airway clear.  NECK: Supple; non tender.  CARD: S1, S2 normal; no murmurs, gallops, or rubs. Regular rate and rhythm.   RESP: No wheezes, rales or rhonchi.  ABD: soft ntnd.   EXT: Normal ROM.  No clubbing, cyanosis or edema.   LYMPH: No acute cervical adenopathy.  NEURO: AOx3, CN 2-12 grossly intact. +5/5 strength and sensation wnl in all extremities. No dysarthria, no DM/DDK. Pt uses wheelchair due to difficulty walking.  PSYCH: Cooperative, appropriate.

## 2019-08-09 NOTE — ED PROVIDER NOTE - PROGRESS NOTE DETAILS
Dr. Espinoza patient evaluated for stroke in critical care are, no acute stroke symptoms at this time. Patient cleared to go to the main.

## 2019-08-09 NOTE — ED ADULT NURSE NOTE - CHPI ED NUR SYMPTOMS NEG
no change in level of consciousness/no blurred vision/no nausea/no numbness/no dizziness/no loss of consciousness/no vomiting/no weakness/no confusion

## 2019-08-09 NOTE — ED PROVIDER NOTE - OBJECTIVE STATEMENT
82 yo F PMHx HTN, HLD. CVA April 2018 right residual weakness improving with PT presents to ED with diplopia 84 yo F PMHx HTN, HLD. CVA April 2018 right residual weakness improving with PT presents to ED with monocular diplopia in her L eye for 20 minutes. Pt was brought to crit, NIH stroke scale of 0 and was moved to main ED. 82 yo F PMHx HTN, HLD. CVA April 2018 right residual weakness improving with PT presents to ED with monocular diplopia in her L eye for 20 minutes. Pt was brought to crit, NIH stroke scale of 0 and was moved to main ED. Pt said double vision has resolved. No current complaints. 82 yo F PMHx HTN, HLD. CVA April 2018 right residual weakness improving with PT presents to ED with monocular diplopia in her L eye for 20 minutes. Pt was brought to crit, no focal neuro deficits, was moved to main ED. Pt said double vision has resolved. No current complaints.

## 2019-08-09 NOTE — ED CDU PROVIDER INITIAL DAY NOTE - PROGRESS NOTE DETAILS
received signout from Dr. Ray for obs tia eval; case d/w NeuroPa Daphne; will see pt in Ed; mri/mra/echo ordered; will follow;

## 2019-08-10 VITALS
SYSTOLIC BLOOD PRESSURE: 121 MMHG | OXYGEN SATURATION: 96 % | HEART RATE: 70 BPM | DIASTOLIC BLOOD PRESSURE: 74 MMHG | TEMPERATURE: 98 F | RESPIRATION RATE: 18 BRPM

## 2019-08-10 PROCEDURE — 70547 MR ANGIOGRAPHY NECK W/O DYE: CPT | Mod: 26

## 2019-08-10 PROCEDURE — 70544 MR ANGIOGRAPHY HEAD W/O DYE: CPT | Mod: 26,59

## 2019-08-10 PROCEDURE — 99217: CPT

## 2019-08-10 PROCEDURE — 99283 EMERGENCY DEPT VISIT LOW MDM: CPT

## 2019-08-10 PROCEDURE — 93306 TTE W/DOPPLER COMPLETE: CPT | Mod: 26

## 2019-08-10 PROCEDURE — 70551 MRI BRAIN STEM W/O DYE: CPT | Mod: 26

## 2019-08-10 RX ADMIN — Medication 200 MILLIGRAM(S): at 06:25

## 2019-08-10 NOTE — ED CDU PROVIDER DISPOSITION NOTE - CARE PROVIDER_API CALL
Naseem Moreira)  EEGEpilepsy; Neurology  [address], [address]  [place] [address]  Phone: [phone]  Fax: [phone]  Follow Up Time: 7-10 Days    Mihai Allison)  Cardiovascular Disease; Internal Medicine; Interventional Cardiology  [address], [address]  [place] [address]  Phone: [phone]  Fax: [phone]  Follow Up Time: 7-10 Days

## 2019-08-10 NOTE — PHARMACOTHERAPY INTERVENTION NOTE - COMMENTS
Prescriber was contacted with recommendation to clarify Enalapril ans Spironolactone orders due to K=5.2

## 2019-08-10 NOTE — ED CDU PROVIDER SUBSEQUENT DAY NOTE - HISTORY
pt resting in obs without complaints; pt pending mri/mra head and neck and echo this am; will continue to monitor;

## 2019-08-10 NOTE — ED CDU PROVIDER DISPOSITION NOTE - CLINICAL COURSE
Pt placed into observation for dizziness. Evaluation by neuro recommended MRI and MRI. No significant findings. Pt will follow up with neuro.

## 2019-08-10 NOTE — ED ADULT NURSE REASSESSMENT NOTE - NS ED NURSE REASSESS COMMENT FT1
Pt received from night nurse. Pt resting in bed comfortably.  Pt on continuos cardiac monitoring. Family at bedside. Pt has no further questions at this time. Will continue to monitor.
Pt received from night nurse. Pt resting in bed comfortably.  Pt on continuos cardiac monitoring. Pt aware of plan of care. Pt has no further questions at this time. Will continue to monitor.
pt is resting in bed on assessment, aaox4, NAD noted. airway is patent and intact, no stridor noted. respirations regular and unlabored, breath sounds CTA bilaterally. heart sounds S1 and S2, no murmurs, rubs, or gallops. neuro status intact, PERRLA, EOMI, no visual deficits or changes, no nystagmus, no neruo deficits noted. +2 radial pulses bilat w/ cap refill less than 2 seconds, pt abdomen soft and non tender, + bowel sounds in all 4 quads. no edema noted.     pt denies any cp, sob, headache, dizziness, n/v, diplopia.

## 2019-08-10 NOTE — ED CDU PROVIDER DISPOSITION NOTE - NSFOLLOWUPINSTRUCTIONS_ED_ALL_ED_FT
return if symptoms persist or gets worse  follow up with primary care doctor/neurologist/cardiologist

## 2019-08-10 NOTE — ED CDU PROVIDER SUBSEQUENT DAY NOTE - PROGRESS NOTE DETAILS
Pt took her usual morning meds. List verified. Pt does not take spironolactone. patient signed out from chucky walker, no complaint will continue to monitor , patient took morning meds verified.

## 2019-08-10 NOTE — ED CDU PROVIDER DISPOSITION NOTE - PROVIDER TOKENS
PROVIDER:[TOKEN:[[id number]],FOLLOWUP:[7-10 Days]],PROVIDER:[TOKEN:[[id number]],FOLLOWUP:[7-10 Days]]

## 2019-08-10 NOTE — ED CDU PROVIDER DISPOSITION NOTE - CARE PROVIDERS DIRECT ADDRESSES
[email or web address] [email or web address]\""[email or web address].net

## 2019-08-10 NOTE — ED ADULT NURSE REASSESSMENT NOTE - COMFORT CARE
darkened lights/side rails up/wait time explained/plan of care explained/repositioned
wait time explained/plan of care explained

## 2019-08-10 NOTE — CONSULT NOTE ADULT - SUBJECTIVE AND OBJECTIVE BOX
Neurology Consultation note    Name  EMILIANO ANAYA    HPI:  84 yo F PMHx HTN, HLD. CVA April 2018 right residual weakness improving with PT presents to ED with binocular diplopia for 20 minutes. Pt was brought to crit, no focal neuro deficits, was moved to main ED. Pt said double vision has resolved. No current complaints.  no ptosis  no other bulbar complaints  hx of cva in 2018 with mild r residual weakness (ich)  patient is asymptomatic now  adm for tia w/u      Interval History:        Vital Signs Last 24 Hrs  T(C): 36.7 (10 Aug 2019 08:19), Max: 37.1 (09 Aug 2019 17:01)  T(F): 98 (10 Aug 2019 08:19), Max: 98.8 (09 Aug 2019 17:01)    HR: 68 (10 Aug 2019 08:19) (66 - 76)  BP: 138/63 (10 Aug 2019 08:19) (110/53 - 170/73)  BP(mean): --  RR: 17 (10 Aug 2019 08:19) (17 - 18)  SpO2: 94% (10 Aug 2019 08:19) (93% - 96%)    Neurological Exam:   Mental status: Awake, alert and oriented x3.  Recent and remote memory intact.  Naming, repetition and comprehension intact.  Attention/concentration intact.  No dysarthria, no aphasia.  Fund of knowledge appropriate.    Cranial nerves: Pupils equally round and reactive to light, visual fields full, no nystagmus, extraocular muscles intact, V1 through V3 intact bilaterally and symmetric, face symmetric, hearing intact to finger rub, palate elevation symmetric, tongue was midline.  Motor:  mild rue weakness   strength 5/5.  Normal tone and bulk.  No abnormal movements.    Sensation: Intact to light touch, proprioception, and pinprick.   Coordination: No dysmetria on finger-to-nose and heel-to-shin.  No dysdiadokinesia.  Reflexes: 2+ in bilateral UE/LE, downgoing toes bilaterally. (-) Siobhan.    Medications      Lab  08-09    144  |  106  |  28<H>  ----------------------------<  90  5.2<H>   |  26  |  0.9    Ca    9.9      09 Aug 2019 20:14    TPro  7.1  /  Alb  4.6  /  TBili  0.2  /  DBili  x   /  AST  13  /  ALT  10  /  AlkPhos  67  08-09                          11.9   7.30  )-----------( 231      ( 09 Aug 2019 20:14 )             37.6     LIVER FUNCTIONS - ( 09 Aug 2019 20:14 )  Alb: 4.6 g/dL / Pro: 7.1 g/dL / ALK PHOS: 67 U/L / ALT: 10 U/L / AST: 13 U/L / GGT: x                   Radiology  CT Head No Cont:   EXAM:  CT BRAIN            PROCEDURE DATE:  08/09/2019            INTERPRETATION:  Clinical History / Reason for exam: Diplopia, history of   stroke    Technique: Non-contrast multiaxial CT scan of the head was performed from   base of the skull tothe vertex.     Comparison: May 8, 2018    Findings:    The ventricular system, basal cisterns sulcal pattern are appropriate for   patients age.    There is no evidence of intra or extra-axial hemorrhage or fluid   collection.  No mass effect or midline shift is seen.    Interval resolution of the left occipital lobe parenchymal hemorrhage.   There is a chronic lacunar infarct in the right subinsular white matter.    The gray-white matter differentiation is preserved.  Mild periventricular   andsubcortical white matter hypodensities are noted without mass effect   compatible with microvascular ischemic changes.     There is no calvarial fracture.    Visualized paranasal sinuses and mastoid air cells are within normal   limits.    Impression:    No acute intracranial abnormality.    Chronic lacunar infarct in the right subinsular white matter, and   interval resolution of parenchymal hemorrhage in the left occipital lobe   since May 2018.    Chronic microvascular ischemic changes, stable.                      CARMELA PATTON M.D., ATTENDING RADIOLOGIST  This document has been electronically signed. Aug  9 2019  8:02PM             (08-09-19 @ 19:34)      Assessment: patient with hx as above adm with transient diplopia  adm for tia/obs    Plan:  MRI brain nc  MRA H/N  CONTINUE STATIN  2 D ECHO

## 2019-08-25 NOTE — DISCHARGE NOTE ADULT - PATIENT PORTAL LINK FT
denies pain/discomfort You can access the [email or web address] Patient Portal, offered by [facility], by registering with the following website: [email or web address]

## 2019-09-12 NOTE — ED ADULT NURSE REASSESSMENT NOTE - REASSESS COMMUNICATION
family informed
Have You Had Previous Labs For This Condition?: has had previous labs
When Was Your Last Blood Work Done?: 09/12/2019

## 2020-02-16 ENCOUNTER — INPATIENT (INPATIENT)
Facility: HOSPITAL | Age: 85
LOS: 2 days | Discharge: HOME | End: 2020-02-19
Attending: INTERNAL MEDICINE | Admitting: INTERNAL MEDICINE
Payer: MEDICARE

## 2020-02-16 VITALS
SYSTOLIC BLOOD PRESSURE: 109 MMHG | TEMPERATURE: 98 F | HEART RATE: 104 BPM | OXYGEN SATURATION: 94 % | HEIGHT: 67 IN | DIASTOLIC BLOOD PRESSURE: 55 MMHG | WEIGHT: 115.08 LBS | RESPIRATION RATE: 18 BRPM

## 2020-02-16 DIAGNOSIS — I69.351 HEMIPLEGIA AND HEMIPARESIS FOLLOWING CEREBRAL INFARCTION AFFECTING RIGHT DOMINANT SIDE: ICD-10-CM

## 2020-02-16 DIAGNOSIS — A08.11 ACUTE GASTROENTEROPATHY DUE TO NORWALK AGENT: ICD-10-CM

## 2020-02-16 LAB
ALBUMIN SERPL ELPH-MCNC: 5 G/DL — SIGNIFICANT CHANGE UP (ref 3.5–5.2)
ALP SERPL-CCNC: 70 U/L — SIGNIFICANT CHANGE UP (ref 30–115)
ALT FLD-CCNC: 15 U/L — SIGNIFICANT CHANGE UP (ref 0–41)
ANION GAP SERPL CALC-SCNC: 15 MMOL/L — HIGH (ref 7–14)
ANION GAP SERPL CALC-SCNC: 17 MMOL/L — HIGH (ref 7–14)
AST SERPL-CCNC: 18 U/L — SIGNIFICANT CHANGE UP (ref 0–41)
BILIRUB SERPL-MCNC: 0.3 MG/DL — SIGNIFICANT CHANGE UP (ref 0.2–1.2)
BUN SERPL-MCNC: 34 MG/DL — HIGH (ref 10–20)
BUN SERPL-MCNC: 36 MG/DL — HIGH (ref 10–20)
C DIFF BY PCR RESULT: NEGATIVE — SIGNIFICANT CHANGE UP
C DIFF TOX GENS STL QL NAA+PROBE: SIGNIFICANT CHANGE UP
CALCIUM SERPL-MCNC: 9.3 MG/DL — SIGNIFICANT CHANGE UP (ref 8.5–10.1)
CALCIUM SERPL-MCNC: 9.8 MG/DL — SIGNIFICANT CHANGE UP (ref 8.5–10.1)
CHLORIDE SERPL-SCNC: 100 MMOL/L — SIGNIFICANT CHANGE UP (ref 98–110)
CHLORIDE SERPL-SCNC: 102 MMOL/L — SIGNIFICANT CHANGE UP (ref 98–110)
CO2 SERPL-SCNC: 23 MMOL/L — SIGNIFICANT CHANGE UP (ref 17–32)
CO2 SERPL-SCNC: 24 MMOL/L — SIGNIFICANT CHANGE UP (ref 17–32)
CREAT SERPL-MCNC: 1.6 MG/DL — HIGH (ref 0.7–1.5)
CREAT SERPL-MCNC: 1.6 MG/DL — HIGH (ref 0.7–1.5)
GLUCOSE BLDC GLUCOMTR-MCNC: 296 MG/DL — HIGH (ref 70–99)
GLUCOSE SERPL-MCNC: 114 MG/DL — HIGH (ref 70–99)
GLUCOSE SERPL-MCNC: 149 MG/DL — HIGH (ref 70–99)
HCT VFR BLD CALC: 42.7 % — SIGNIFICANT CHANGE UP (ref 37–47)
HGB BLD-MCNC: 14.2 G/DL — SIGNIFICANT CHANGE UP (ref 12–16)
LIDOCAIN IGE QN: 19 U/L — SIGNIFICANT CHANGE UP (ref 7–60)
MCHC RBC-ENTMCNC: 28.3 PG — SIGNIFICANT CHANGE UP (ref 27–31)
MCHC RBC-ENTMCNC: 33.3 G/DL — SIGNIFICANT CHANGE UP (ref 32–37)
MCV RBC AUTO: 85.2 FL — SIGNIFICANT CHANGE UP (ref 81–99)
NRBC # BLD: 0 /100 WBCS — SIGNIFICANT CHANGE UP (ref 0–0)
PLATELET # BLD AUTO: 284 K/UL — SIGNIFICANT CHANGE UP (ref 130–400)
POTASSIUM SERPL-MCNC: 5.5 MMOL/L — HIGH (ref 3.5–5)
POTASSIUM SERPL-MCNC: 5.7 MMOL/L — HIGH (ref 3.5–5)
POTASSIUM SERPL-SCNC: 5.5 MMOL/L — HIGH (ref 3.5–5)
POTASSIUM SERPL-SCNC: 5.7 MMOL/L — HIGH (ref 3.5–5)
PROT SERPL-MCNC: 7.6 G/DL — SIGNIFICANT CHANGE UP (ref 6–8)
RBC # BLD: 5.01 M/UL — SIGNIFICANT CHANGE UP (ref 4.2–5.4)
RBC # FLD: 14.2 % — SIGNIFICANT CHANGE UP (ref 11.5–14.5)
SODIUM SERPL-SCNC: 140 MMOL/L — SIGNIFICANT CHANGE UP (ref 135–146)
SODIUM SERPL-SCNC: 141 MMOL/L — SIGNIFICANT CHANGE UP (ref 135–146)
WBC # BLD: 12.88 K/UL — HIGH (ref 4.8–10.8)
WBC # FLD AUTO: 12.88 K/UL — HIGH (ref 4.8–10.8)

## 2020-02-16 PROCEDURE — 93010 ELECTROCARDIOGRAM REPORT: CPT

## 2020-02-16 PROCEDURE — 99285 EMERGENCY DEPT VISIT HI MDM: CPT | Mod: GC

## 2020-02-16 RX ORDER — NIFEDIPINE 30 MG
90 TABLET, EXTENDED RELEASE 24 HR ORAL DAILY
Refills: 0 | Status: DISCONTINUED | OUTPATIENT
Start: 2020-02-16 | End: 2020-02-19

## 2020-02-16 RX ORDER — INSULIN HUMAN 100 [IU]/ML
10 INJECTION, SOLUTION SUBCUTANEOUS ONCE
Refills: 0 | Status: DISCONTINUED | OUTPATIENT
Start: 2020-02-16 | End: 2020-02-16

## 2020-02-16 RX ORDER — SERTRALINE 25 MG/1
100 TABLET, FILM COATED ORAL DAILY
Refills: 0 | Status: DISCONTINUED | OUTPATIENT
Start: 2020-02-16 | End: 2020-02-19

## 2020-02-16 RX ORDER — SODIUM CHLORIDE 9 MG/ML
1000 INJECTION, SOLUTION INTRAVENOUS ONCE
Refills: 0 | Status: COMPLETED | OUTPATIENT
Start: 2020-02-16 | End: 2020-02-16

## 2020-02-16 RX ORDER — CHLORHEXIDINE GLUCONATE 213 G/1000ML
1 SOLUTION TOPICAL
Refills: 0 | Status: DISCONTINUED | OUTPATIENT
Start: 2020-02-16 | End: 2020-02-19

## 2020-02-16 RX ORDER — HEPARIN SODIUM 5000 [USP'U]/ML
5000 INJECTION INTRAVENOUS; SUBCUTANEOUS EVERY 8 HOURS
Refills: 0 | Status: DISCONTINUED | OUTPATIENT
Start: 2020-02-16 | End: 2020-02-18

## 2020-02-16 RX ORDER — ATORVASTATIN CALCIUM 80 MG/1
40 TABLET, FILM COATED ORAL AT BEDTIME
Refills: 0 | Status: DISCONTINUED | OUTPATIENT
Start: 2020-02-16 | End: 2020-02-19

## 2020-02-16 RX ORDER — SENNA PLUS 8.6 MG/1
2 TABLET ORAL AT BEDTIME
Refills: 0 | Status: DISCONTINUED | OUTPATIENT
Start: 2020-02-16 | End: 2020-02-18

## 2020-02-16 RX ORDER — LABETALOL HCL 100 MG
200 TABLET ORAL
Refills: 0 | Status: DISCONTINUED | OUTPATIENT
Start: 2020-02-16 | End: 2020-02-19

## 2020-02-16 RX ORDER — INSULIN HUMAN 100 [IU]/ML
10 INJECTION, SOLUTION SUBCUTANEOUS ONCE
Refills: 0 | Status: COMPLETED | OUTPATIENT
Start: 2020-02-16 | End: 2020-02-16

## 2020-02-16 RX ORDER — SODIUM CHLORIDE 9 MG/ML
1000 INJECTION INTRAMUSCULAR; INTRAVENOUS; SUBCUTANEOUS
Refills: 0 | Status: DISCONTINUED | OUTPATIENT
Start: 2020-02-16 | End: 2020-02-19

## 2020-02-16 RX ORDER — DEXTROSE 10 % IN WATER 10 %
250 INTRAVENOUS SOLUTION INTRAVENOUS
Refills: 0 | Status: DISCONTINUED | OUTPATIENT
Start: 2020-02-16 | End: 2020-02-16

## 2020-02-16 RX ORDER — DEXTROSE 10 % IN WATER 10 %
250 INTRAVENOUS SOLUTION INTRAVENOUS
Refills: 0 | Status: COMPLETED | OUTPATIENT
Start: 2020-02-16 | End: 2020-02-16

## 2020-02-16 RX ORDER — ONDANSETRON 8 MG/1
4 TABLET, FILM COATED ORAL ONCE
Refills: 0 | Status: COMPLETED | OUTPATIENT
Start: 2020-02-16 | End: 2020-02-16

## 2020-02-16 RX ADMIN — Medication 500 MILLILITER(S): at 20:15

## 2020-02-16 RX ADMIN — INSULIN HUMAN 10 UNIT(S): 100 INJECTION, SOLUTION SUBCUTANEOUS at 21:13

## 2020-02-16 RX ADMIN — ONDANSETRON 4 MILLIGRAM(S): 8 TABLET, FILM COATED ORAL at 13:35

## 2020-02-16 RX ADMIN — SODIUM CHLORIDE 100 MILLILITER(S): 9 INJECTION INTRAMUSCULAR; INTRAVENOUS; SUBCUTANEOUS at 20:14

## 2020-02-16 RX ADMIN — SODIUM CHLORIDE 1000 MILLILITER(S): 9 INJECTION, SOLUTION INTRAVENOUS at 14:46

## 2020-02-16 RX ADMIN — SODIUM CHLORIDE 1000 MILLILITER(S): 9 INJECTION, SOLUTION INTRAVENOUS at 13:35

## 2020-02-16 RX ADMIN — Medication 200 MILLIGRAM(S): at 21:15

## 2020-02-16 NOTE — H&P ADULT - NSICDXPASTMEDICALHX_GEN_ALL_CORE_FT
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

## 2020-02-16 NOTE — H&P ADULT - ASSESSMENT
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    # Acute gastroenteritis with moderate dehydration and MARY:  - admit to medicine  - IVF NS at 100 cc/hr  - c diff negative  - advance diet as tolerated    # MARY:  - b/l Cr 0.7  - a/w hyperkalemia 5.5-5.7 without EKG changes, s/p ttt for K  - repeat K at 11.30 PM and treat as needed  - no indications for dialysis  - hold ACEI    # HTN:  - c/w BB and CCB, hold ACEI    # DVT ppx: HSQ  Diet: advance as tolerated  Activity: AAT  Full code

## 2020-02-16 NOTE — GOALS OF CARE CONVERSATION - ADVANCED CARE PLANNING - CONVERSATION DETAILS
Goals of care discussed in light of patient's comorbidities, family wishes for her to be DNR/DNI. Goals of care discussed in light of patient's comorbidities, family wishes for her to be DNR/DNI. MOLST form completed and in chart.

## 2020-02-16 NOTE — H&P ADULT - NSHPPHYSICALEXAM_GEN_ALL_CORE
PHYSICAL EXAM:    T(C): 36.6 (02-16-20 @ 12:38), Max: 36.6 (02-16-20 @ 12:38)  HR: 104 (02-16-20 @ 12:38) (104 - 104)  BP: 109/55 (02-16-20 @ 12:38) (109/55 - 109/55)  RR: 18 (02-16-20 @ 12:38) (18 - 18)  SpO2: 94% (02-16-20 @ 12:38) (94% - 94%)    Constitutional: NAD  HEENT: Neck supple, No oral lesions, no throat redness or swelling  Respiratory: Breath sounds  CTA b/l, no accessory muscle use  Cardiovascular: regular rate and rhythm, normal S1 S2, no murmurs  Gastrointestinal: soft non-tender no hepatosplenomegaly, normal BS  Genitourinary: no lesions, no discharge  Extremities: positive peripheral pulses no extremity edema  Neurological: AAO4 no focal motor deficit  Skin: no lesions or wounds  Musculoskeletal: no joint swelling or tenderness

## 2020-02-16 NOTE — ED PROVIDER NOTE - OBJECTIVE STATEMENT
The pt is an 84y F w/ hx of hyperlipidemia and prior stroke w/ residual RLE deficits presenting with NBNB emesis + NB diarrhea since 01:00 this morning. Pt notes >10 episodes of each. Her daughter, who she was with on Friday, had similar symptoms yesterday. Pt denies abdominal pain, fever. In the ED, denies headache, blurry vision, chest pain, SOB, numbness/tingling in any extremities. No other medical or surgical hx. Allergies to Celebrex, sulfa. Former smoker.

## 2020-02-16 NOTE — ED PROVIDER NOTE - CLINICAL SUMMARY MEDICAL DECISION MAKING FREE TEXT BOX
84yoF with h/o HLD, CVA, presents with 10 episodes NBNB emesis and diarrhea since 1AM today. Associated tiredness. Denies all other symptoms including fever, abd/chest pain, SOB, recent abx use or travel, HA, or any other symptoms. Pt visited her  on Friday at assisted living and was told something was going around there, and daughter here states was RN 8 yrs ago and she is very familiar with C diff and pt's diarrhea smelled like this. On exam, afebrile, hemodynamically stable, saturating well, NAD, very well appearing, head NCAT, EOMI grossly, anicteric, MMM, no JVD, RRR, nml S1/S2, no m/r/g, lungs CTAB, no w/r/r, abd soft, entirely NT, ND, nml BS, no rebound or guarding, AAO, CN's 3-12 grossly intact, LI spontaneously, no leg cyanosis or edema, skin warm, dry. Abdomen entirely benign with low suspicion for acute process, and no past GI hx. No HA or signs of elevated ICP. No CP/SOB or signs of ACS. Concern for C diff based on daughter's report, however negative on testing here. Noted MARY likely from dehydration, however on repeat BMP not improved and pt continuing to have large amounts of diarrhea concerning for worsening dehydration. Patient nontoxic appearing, hemodynamically stable. Given fluids. Admitted to internal medicine for further monitoring, w/u, and care. 84yoF with h/o HLD, CVA, presents with 10 episodes NBNB emesis and diarrhea since 1AM today. Associated tiredness. Denies all other symptoms including fever, abd/chest pain, SOB, recent abx use or travel, HA, or any other symptoms. Pt visited her  on Friday at assisted living and was told something was going around there, and daughter here states was RN 8 yrs ago and she is very familiar with C diff and pt's diarrhea smelled like this. On exam, afebrile, hemodynamically stable, saturating well, NAD, very well appearing, head NCAT, EOMI grossly, anicteric, MMM, no JVD, RRR, nml S1/S2, no m/r/g, lungs CTAB, no w/r/r, abd soft, entirely NT, ND, nml BS, no rebound or guarding, AAO, CN's 3-12 grossly intact, LI spontaneously, no leg cyanosis or edema, skin warm, dry. Abdomen entirely benign with low suspicion for acute process, and no past GI hx. No HA or signs of elevated ICP. No CP/SOB or signs of ACS. Concern for C diff based on daughter's report, however negative on testing here. Noted MARY likely from dehydration, however on repeat BMP not improved and pt continuing to have large amounts of diarrhea concerning for worsening dehydration. Mild hyperkalemia with no ECG changes, will not treat at this time. Patient nontoxic appearing, hemodynamically stable. Given fluids. Admitted to internal medicine for further monitoring, w/u, and care.

## 2020-02-16 NOTE — ED PROVIDER NOTE - NS ED ROS FT
Constitutional: (-) fever  Eyes/ENT: (-) blurry vision, (-) epistaxis  Cardiovascular: (-) chest pain, (-) syncope  Respiratory: (-) cough, (-) shortness of breath  Gastrointestinal: (+) vomiting, (+) diarrhea, (-) abdominal pain   Musculoskeletal: (-) neck pain, (-) back pain, (-) joint pain  Integumentary: (-) rash, (-) edema  Neurological: (-) headache, (-) altered mental status  Psychiatric: (-) hallucinations  Allergic/Immunologic: (-) pruritus

## 2020-02-16 NOTE — ED PROVIDER NOTE - PHYSICAL EXAMINATION
Vital Signs: I have reviewed the initial vital signs. .   Constitutional: well-nourished, appears stated age, no acute distress  Cardiovascular: (+) tachycardic, regular rhythm, well-perfused extremities  Respiratory: unlabored respiratory effort, (+) BL lower lung field rhonchi   Gastrointestinal: soft, non-tender abdomen  Musculoskeletal: supple neck, no lower extremity edema  Integumentary: warm, dry, no rash  Neurologic: awake, alert, extremities’ motor and sensory functions grossly intact  Psychiatric: appropriate mood, appropriate affect

## 2020-02-16 NOTE — H&P ADULT - HISTORY OF PRESENT ILLNESS
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 Patient was visiting her  in assisted living 2 days ago and when she went back home developed non-bilious non-bloody vomiting and diarrhea that has not resolved over past 36 hours. Her daughter who was with her also developed vomiting. She denies eating canned food or cream or raw fish. Reports nontolerance to solids and liquids. Denies chest pain, cough, plapitations, bloody stools, recent travel.  In ED had leukocytosis, moderate dehydration.

## 2020-02-16 NOTE — ED ADULT NURSE REASSESSMENT NOTE - NS ED NURSE REASSESS COMMENT FT1
pt. came to ED with vomiting and diarrhea since this morning. pt. alert and oriented times four. VSS. Labs drawn and sent. awaiting stool sample to r/o c-diff as per daughter smells like c-diff. meds given as ordered, IVF's started. Comfort and safety measures provided. will monitor.

## 2020-02-16 NOTE — ED PROVIDER NOTE - PROGRESS NOTE DETAILS
Discussed lab results with pt and family regarding unchanged creatinine on repeat BMP. Pt agreeable to admission for IV hydration and monitoring of kidney function.

## 2020-02-16 NOTE — ED ADULT NURSE NOTE - PMH
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

## 2020-02-16 NOTE — ED PROVIDER NOTE - CADM POA CENTRAL LINE
Mi is a 24 year old  referred here by self for consultation regarding vaginal discharge with bumps. Some odor. No odor..    ROS: Ten point review of systems was reviewed and negative except the above.    Gyne: - abn pap (last pap ), - STD's    Past Medical History:   Diagnosis Date     IUD (intrauterine device) in place 2016     NO ACTIVE PROBLEMS      Past Surgical History:   Procedure Laterality Date     XR ANKLE SURGERY SUZETTE LEFT       Patient Active Problem List   Diagnosis     IUD (intrauterine device) in place       ALL/Meds: Her medication and allergy histories were reviewed and are documented in their appropriate chart areas.    SH: - tob, - EtOH,     FH: Her family history was reviewed and documented in its appropriate chart area.    PE: /74   Pulse 67   Wt 75.7 kg (166 lb 12.8 oz)   LMP 10/06/2019   SpO2 98%   BMI 27.76 kg/m    Body mass index is 27.76 kg/m .    General Appearance:  healthy, alert, active, no distress  HEENT: NCAT  PSYCH;normal.  Abdomen: Soft, nontender.  Normal bowel sounds.  No masses  Pelvic:       - Ext: NEFG, no       - Urethra: no lesions, no masses, no hypermobility       - Urethral Meatus: normal appearance,        - Bladder: no tenderness, no masses       - Vagina: pink, moist, normal rugae, no lesions, curdy discharge       - Cervix: no lesions, nulliparous       - Uterus: normal sized, AV/, mobile, normal contour       - Adnexa: no masses, no tenderness       - Rectal: deferred,        - Pelvic support: no cystocele, no rectocele, no uterine prolapse  Results for orders placed or performed in visit on 10/15/19   Wet prep   Result Value Ref Range    Specimen Description Vagina     Wet Prep No Trichomonas seen     Wet Prep Moderate  Yeast seen   (A)     Wet Prep Many  Clue cells seen   (A)     Wet Prep Many  WBC'S seen          A/P    ICD-10-CM    1. Vaginal discharge N89.8 Wet prep     fluconazole (DIFLUCAN) 150 MG tablet     metroNIDAZOLE (FLAGYL) 500  MG tablet   2. Yeast infection of the vagina B37.3 fluconazole (DIFLUCAN) 150 MG tablet   3. BV (bacterial vaginosis) N76.0 metroNIDAZOLE (FLAGYL) 500 MG tablet    B96.89       Antibiotics for yeast and BV..  25 minutes was spent face to face with the patient today discussing her history, diagnosis, and follow-up plan as noted above.  Over 50% of the visit was spent in counseling and coordination of care.    Total Visit Time: 30 minutes.        CEPHAS AGBEH, MD.    No

## 2020-02-16 NOTE — H&P ADULT - ATTENDING COMMENTS
PHYSICAL EXAM:    CONSTITUTIONAL: NAD, somnolent   ENMT: EOMI, PERRLA, No tonsillar erythema, exudates, or enlargement, neck supple, No JVD  PSYCH: Alert & Oriented X3, denies suidicial or homicidial ideation, denies auditory or visual hallucinations   RESPIRATORY: Clear to percussion bilaterally; No rales, rhonchi, wheezing, or rubs  CARDIOVASCULAR: Regular rate and rhythm; No murmurs, rubs, or gallops, negative edema  GASTROINTESTINAL: Soft, Nontender, Nondistended; Bowel sounds present  EXTREMITIES:  2+ Peripheral Pulses, No clubbing, cyanosis  SKIN: No rashes or lesions Daughter, Carli, present by bedside.    PHYSICAL EXAM:    CONSTITUTIONAL: NAD, somnolent   ENMT: EOMI, PERRLA, No tonsillar erythema, exudates, or enlargement, neck supple, No JVD  PSYCH: Alert & Oriented X3  RESPIRATORY: Clear to percussion bilaterally; No rales, rhonchi, wheezing, or rubs  CARDIOVASCULAR: Regular rate and rhythm; No murmurs, rubs, or gallops, negative edema  GASTROINTESTINAL: Soft, Nontender, Nondistended; Bowel sounds present  EXTREMITIES:  2+ Peripheral Pulses, No clubbing, cyanosis  SKIN: No rashes or lesions    83 yo F PMHx of Hemorrhagic CVA in 2018 with residual RLE weakness, DLD, HTN, Hemorrhoids  presented with complaint of acute onset nausea, nonbloody, nonbilious vomitus associated with decreased PO intake and multiple episodes of profuse, watery, nonbloody diarrhea (more than 10 episodes in the past 2 days as per daughter). Patient visited her  at [facility] residence on Thursday, developed symptoms on Friday, patient's other daughter also developed similar symptoms of diarrhea on Friday. Patient denies fever, chills, changes in diet.     #Suspected Gastroenteritis, Dehydration: C. Diff negative, continue IVF until PO toleration, advance diet as tolerated, anti-emetic, pain control    #MARY: Baseline Creatinine 0.5-0.9, continue IVF monitor BUN/Cr      #Hyperkalemia: s/p insulin and D50 administrations, f/u evening repeat BMP, telemetry monitoring, f/u AM EKG, ACE held    #HTN: Continue antihypertensives    Disposition: Home when medically stable, resides with daughter. Daughter, Carli, present by bedside.    PHYSICAL EXAM:    CONSTITUTIONAL: NAD, somnolent   ENMT: EOMI, PERRLA, No tonsillar erythema, exudates, or enlargement, neck supple, No JVD  PSYCH: Alert & Oriented X3  RESPIRATORY: Clear to percussion bilaterally; No rales, rhonchi, wheezing, or rubs  CARDIOVASCULAR: Regular rate and rhythm; No murmurs, rubs, or gallops, negative edema  GASTROINTESTINAL: Soft, Nontender, Nondistended; Bowel sounds present  EXTREMITIES:  2+ Peripheral Pulses, No clubbing, cyanosis  SKIN: No rashes or lesions    85 yo F PMHx of Hemorrhagic CVA in 2018 with residual RLE weakness, Short Term Memory Loss as per daughter, DLD, HTN, Hemorrhoids  presented with complaint of acute onset nausea, nonbloody, nonbilious vomitus associated with decreased PO intake and multiple episodes of profuse, watery, nonbloody diarrhea (more than 10 episodes in the past 2 days as per daughter). Patient visited her  at [facility] residence on Thursday, developed symptoms on Friday, patient's other daughter also developed similar symptoms of diarrhea on Friday. Patient denies fever, chills, changes in diet.     #Suspected Gastroenteritis, Dehydration: C. Diff negative, continue IVF until PO toleration, advance diet as tolerated, anti-emetic, pain control    #MARY: Baseline Creatinine 0.5-0.9, continue IVF monitor BUN/Cr      #Hyperkalemia: s/p insulin and D50 administrations, f/u evening repeat BMP, telemetry monitoring, f/u AM EKG, ACE held    #HTN: Continue antihypertensives    Disposition: Home when medically stable, resides with daughter.

## 2020-02-16 NOTE — ED PROVIDER NOTE - CARE PLAN
Principal Discharge DX:	MARY (acute kidney injury)  Secondary Diagnosis:	Emesis  Secondary Diagnosis:	Diarrhea Principal Discharge DX:	MARY (acute kidney injury)  Secondary Diagnosis:	Emesis  Secondary Diagnosis:	Diarrhea  Secondary Diagnosis:	Moderate dehydration

## 2020-02-16 NOTE — ED PROVIDER NOTE - ATTENDING CONTRIBUTION TO CARE
84yoF with h/o HLD, CVA, presents with 10 episodes NBNB emesis and diarrhea since 1AM today. Associated tiredness. Denies all other symptoms including fever, abd/chest pain, SOB, recent abx use or travel, HA, or any other symptoms. Pt visited her  on Friday at assisted living and was told something was going around there, and daughter here states was RN 8 yrs ago and she is very familiar with C diff and pt's diarrhea smelled like this. On exam, afebrile, hemodynamically stable, saturating well, NAD, very well appearing, head NCAT, EOMI grossly, anicteric, MMM, no JVD, RRR, nml S1/S2, no m/r/g, lungs CTAB, no w/r/r, abd soft, entirely NT, ND, nml BS, no rebound or guarding, AAO, CN's 3-12 grossly intact, LI spontaneously, no leg cyanosis or edema, skin warm, dry. Abdomen entirely benign with low suspicion for acute process, and no past GI hx. No HA or signs of elevated ICP. No CP/SOB or signs of ACS. Concern for C diff based on daughter's report. Noted MARY likely from dehydration, and on repeat BMP ________. 84yoF with h/o HLD, CVA, presents with 10 episodes NBNB emesis and diarrhea since 1AM today. Associated tiredness. Denies all other symptoms including fever, abd/chest pain, SOB, recent abx use or travel, HA, or any other symptoms. Pt visited her  on Friday at assisted living and was told something was going around there, and daughter here states was RN 8 yrs ago and she is very familiar with C diff and pt's diarrhea smelled like this. On exam, afebrile, hemodynamically stable, saturating well, NAD, very well appearing, head NCAT, EOMI grossly, anicteric, MMM, no JVD, RRR, nml S1/S2, no m/r/g, lungs CTAB, no w/r/r, abd soft, entirely NT, ND, nml BS, no rebound or guarding, AAO, CN's 3-12 grossly intact, LI spontaneously, no leg cyanosis or edema, skin warm, dry. Abdomen entirely benign with low suspicion for acute process, and no past GI hx. No HA or signs of elevated ICP. No CP/SOB or signs of ACS. Concern for C diff based on daughter's report, however negative on testing here. Noted MARY likely from dehydration, however on repeat BMP not improved and pt continuing to have large amounts of diarrhea concerning for worsening dehydration. Patient nontoxic appearing, hemodynamically stable. Given fluids. Admitted to internal medicine for further monitoring, w/u, and care. 84yoF with h/o HLD, CVA, presents with 10 episodes NBNB emesis and diarrhea since 1AM today. Associated tiredness. Denies all other symptoms including fever, abd/chest pain, SOB, recent abx use or travel, HA, or any other symptoms. Pt visited her  on Friday at assisted living and was told something was going around there, and daughter here states was RN 8 yrs ago and she is very familiar with C diff and pt's diarrhea smelled like this. On exam, afebrile, hemodynamically stable, saturating well, NAD, very well appearing, head NCAT, EOMI grossly, anicteric, MMM, no JVD, RRR, nml S1/S2, no m/r/g, lungs CTAB, no w/r/r, abd soft, entirely NT, ND, nml BS, no rebound or guarding, AAO, CN's 3-12 grossly intact, LI spontaneously, no leg cyanosis or edema, skin warm, dry. Abdomen entirely benign with low suspicion for acute process, and no past GI hx. No HA or signs of elevated ICP. No CP/SOB or signs of ACS. Concern for C diff based on daughter's report, however negative on testing here. Noted MARY likely from dehydration, however on repeat BMP not improved and pt continuing to have large amounts of diarrhea concerning for worsening dehydration. Mild hyperkalemia with no ECG changes, will not treat at this time. Patient nontoxic appearing, hemodynamically stable. Given fluids. Admitted to internal medicine for further monitoring, w/u, and care.

## 2020-02-16 NOTE — H&P ADULT - NSHPREVIEWOFSYSTEMS_GEN_ALL_CORE
REVIEW OF SYSTEMS    CONSTITUTIONAL: No weakness, fevers or chills  EYES/ENT: No visual changes;  No vertigo or throat pain   NECK: No pain or stiffness. No cervical midline tenderness.  RESPIRATORY: No difficulty breathing, cough, wheezing.  CARDIOVASCULAR: No chest pain or palpitations  GASTROINTESTINAL: + vomiting and diarrhea. No abdominal or epigastric pain.  No melena, BRBPR, or hematochezia.  GENITOURINARY: No dysuria, frequency or hematuria.  NEUROLOGICAL: No headache. No numbness or weakness.

## 2020-02-16 NOTE — ED ADULT NURSE NOTE - ED CARDIAC RATE
"Brandon Macias's goals for this visit include: Follow up Hyperthyroid  He requests these members of his care team be copied on today's visit information: YES    PCP: Jack Horton    Referring Provider:  No referring provider defined for this encounter.    Chief Complaint   Patient presents with     Thyroid Problem       Initial There were no vitals taken for this visit. Estimated body mass index is 26.68 kg/(m^2) as calculated from the following:    Height as of 12/23/16: 1.651 m (5' 5\").    Weight as of 12/23/16: 72.7 kg (160 lb 4.8 oz).  Medication Reconciliation: complete    Do you need any medication refills at today's visit? YES    "
normal

## 2020-02-17 LAB
ANION GAP SERPL CALC-SCNC: 11 MMOL/L — SIGNIFICANT CHANGE UP (ref 7–14)
ANION GAP SERPL CALC-SCNC: 12 MMOL/L — SIGNIFICANT CHANGE UP (ref 7–14)
BUN SERPL-MCNC: 30 MG/DL — HIGH (ref 10–20)
BUN SERPL-MCNC: 35 MG/DL — HIGH (ref 10–20)
CALCIUM SERPL-MCNC: 8.5 MG/DL — SIGNIFICANT CHANGE UP (ref 8.5–10.1)
CALCIUM SERPL-MCNC: 8.6 MG/DL — SIGNIFICANT CHANGE UP (ref 8.5–10.1)
CHLORIDE SERPL-SCNC: 100 MMOL/L — SIGNIFICANT CHANGE UP (ref 98–110)
CHLORIDE SERPL-SCNC: 107 MMOL/L — SIGNIFICANT CHANGE UP (ref 98–110)
CO2 SERPL-SCNC: 22 MMOL/L — SIGNIFICANT CHANGE UP (ref 17–32)
CO2 SERPL-SCNC: 23 MMOL/L — SIGNIFICANT CHANGE UP (ref 17–32)
CREAT SERPL-MCNC: 1.1 MG/DL — SIGNIFICANT CHANGE UP (ref 0.7–1.5)
CREAT SERPL-MCNC: 1.4 MG/DL — SIGNIFICANT CHANGE UP (ref 0.7–1.5)
GLUCOSE SERPL-MCNC: 89 MG/DL — SIGNIFICANT CHANGE UP (ref 70–99)
GLUCOSE SERPL-MCNC: 92 MG/DL — SIGNIFICANT CHANGE UP (ref 70–99)
HCT VFR BLD CALC: 32.5 % — LOW (ref 37–47)
HGB BLD-MCNC: 10.5 G/DL — LOW (ref 12–16)
MAGNESIUM SERPL-MCNC: 1.9 MG/DL — SIGNIFICANT CHANGE UP (ref 1.8–2.4)
MCHC RBC-ENTMCNC: 27.4 PG — SIGNIFICANT CHANGE UP (ref 27–31)
MCHC RBC-ENTMCNC: 32.3 G/DL — SIGNIFICANT CHANGE UP (ref 32–37)
MCV RBC AUTO: 84.9 FL — SIGNIFICANT CHANGE UP (ref 81–99)
NRBC # BLD: 0 /100 WBCS — SIGNIFICANT CHANGE UP (ref 0–0)
PHOSPHATE SERPL-MCNC: 3.7 MG/DL — SIGNIFICANT CHANGE UP (ref 2.1–4.9)
PLATELET # BLD AUTO: 186 K/UL — SIGNIFICANT CHANGE UP (ref 130–400)
POTASSIUM SERPL-MCNC: 4.5 MMOL/L — SIGNIFICANT CHANGE UP (ref 3.5–5)
POTASSIUM SERPL-MCNC: 4.8 MMOL/L — SIGNIFICANT CHANGE UP (ref 3.5–5)
POTASSIUM SERPL-SCNC: 4.5 MMOL/L — SIGNIFICANT CHANGE UP (ref 3.5–5)
POTASSIUM SERPL-SCNC: 4.8 MMOL/L — SIGNIFICANT CHANGE UP (ref 3.5–5)
RBC # BLD: 3.83 M/UL — LOW (ref 4.2–5.4)
RBC # FLD: 14.1 % — SIGNIFICANT CHANGE UP (ref 11.5–14.5)
SODIUM SERPL-SCNC: 135 MMOL/L — SIGNIFICANT CHANGE UP (ref 135–146)
SODIUM SERPL-SCNC: 140 MMOL/L — SIGNIFICANT CHANGE UP (ref 135–146)
WBC # BLD: 4.76 K/UL — LOW (ref 4.8–10.8)
WBC # FLD AUTO: 4.76 K/UL — LOW (ref 4.8–10.8)

## 2020-02-17 PROCEDURE — 99497 ADVNCD CARE PLAN 30 MIN: CPT | Mod: 25

## 2020-02-17 PROCEDURE — 93010 ELECTROCARDIOGRAM REPORT: CPT

## 2020-02-17 PROCEDURE — 99223 1ST HOSP IP/OBS HIGH 75: CPT

## 2020-02-17 RX ORDER — SODIUM CHLORIDE 9 MG/ML
500 INJECTION INTRAMUSCULAR; INTRAVENOUS; SUBCUTANEOUS ONCE
Refills: 0 | Status: COMPLETED | OUTPATIENT
Start: 2020-02-17 | End: 2020-02-17

## 2020-02-17 RX ADMIN — SODIUM CHLORIDE 100 MILLILITER(S): 9 INJECTION INTRAMUSCULAR; INTRAVENOUS; SUBCUTANEOUS at 09:50

## 2020-02-17 RX ADMIN — Medication 200 MILLIGRAM(S): at 17:58

## 2020-02-17 RX ADMIN — HEPARIN SODIUM 5000 UNIT(S): 5000 INJECTION INTRAVENOUS; SUBCUTANEOUS at 13:25

## 2020-02-17 RX ADMIN — Medication 200 MILLIGRAM(S): at 09:49

## 2020-02-17 RX ADMIN — SERTRALINE 100 MILLIGRAM(S): 25 TABLET, FILM COATED ORAL at 11:20

## 2020-02-17 RX ADMIN — SODIUM CHLORIDE 1000 MILLILITER(S): 9 INJECTION INTRAMUSCULAR; INTRAVENOUS; SUBCUTANEOUS at 02:22

## 2020-02-17 NOTE — PROGRESS NOTE ADULT - SUBJECTIVE AND OBJECTIVE BOX
SUBJECTIVE:    Patient is a 84y old Female who presents with a chief complaint of vomiting and diarrhea (16 Feb 2020 18:48)    Currently admitted to medicine with the primary diagnosis of MARY (acute kidney injury)     Today is hospital day 1d. This morning she is resting comfortably in bed and reports no new issues or overnight events.     INTERVAL EVENTS: 4 diarrheal episodes overnight. Vomiting resolved.     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 1000 milliLiter(s) IV Continuous <Continuous>    PRN MEDICATIONS    VITALS:   T(F): 99  HR: 73  BP: 142/65  RR: 18  SpO2: 93%    LABS:                        10.5   4.76  )-----------( 186      ( 17 Feb 2020 07:17 )             32.5     02-17    140  |  107  |  30<H>  ----------------------------<  89  4.8   |  22  |  1.1    Ca    8.6      17 Feb 2020 07:17  Phos  3.7     02-17  Mg     1.9     02-17    TPro  7.6  /  Alb  5.0  /  TBili  0.3  /  DBili  x   /  AST  18  /  ALT  15  /  AlkPhos  70  02-16    RADIOLOGY:    PHYSICAL EXAM:  GEN: No acute distress  PULM/CHEST: Clear to auscultation bilaterally, no rales, rhonchi or wheezes   CVS: Regular rate and rhythm, S1-S2, no murmurs  ABD: Soft, non-tender, non-distended, +BS  EXT: No edema  NEURO: AAOx3

## 2020-02-17 NOTE — PROGRESS NOTE ADULT - ASSESSMENT
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    # Acute gastroenteritis - likely viral  - Visited  at assisted living where family reports several cases of viral gastroenteritis  - Continue IV hydration  - c diff negative  - Check stool PCR  - Vomiting resolved; Still with diarrhea  - Advance diet as tolerated    # MARY - resolving  Likely prerenal from vomiting and diarrhea  - Baseline creatinine ~0.7  - Monitor    # HTN - Continue Procardia and Labetalol; hold ACEI for now    DVT ppx: Heparin SC    Dispo: from home. Lives with daughter. Wheelchair bound

## 2020-02-18 ENCOUNTER — TRANSCRIPTION ENCOUNTER (OUTPATIENT)
Age: 85
End: 2020-02-18

## 2020-02-18 LAB
ANION GAP SERPL CALC-SCNC: 11 MMOL/L — SIGNIFICANT CHANGE UP (ref 7–14)
ANISOCYTOSIS BLD QL: SLIGHT — SIGNIFICANT CHANGE UP
BASOPHILS # BLD AUTO: 0.03 K/UL — SIGNIFICANT CHANGE UP (ref 0–0.2)
BASOPHILS NFR BLD AUTO: 0.9 % — SIGNIFICANT CHANGE UP (ref 0–1)
BUN SERPL-MCNC: 17 MG/DL — SIGNIFICANT CHANGE UP (ref 10–20)
CALCIUM SERPL-MCNC: 8.6 MG/DL — SIGNIFICANT CHANGE UP (ref 8.5–10.1)
CHLORIDE SERPL-SCNC: 111 MMOL/L — HIGH (ref 98–110)
CO2 SERPL-SCNC: 22 MMOL/L — SIGNIFICANT CHANGE UP (ref 17–32)
CREAT SERPL-MCNC: 0.8 MG/DL — SIGNIFICANT CHANGE UP (ref 0.7–1.5)
CULTURE RESULTS: SIGNIFICANT CHANGE UP
EOSINOPHIL # BLD AUTO: 0.13 K/UL — SIGNIFICANT CHANGE UP (ref 0–0.7)
EOSINOPHIL NFR BLD AUTO: 3.5 % — SIGNIFICANT CHANGE UP (ref 0–8)
GIANT PLATELETS BLD QL SMEAR: PRESENT — SIGNIFICANT CHANGE UP
GLUCOSE SERPL-MCNC: 104 MG/DL — HIGH (ref 70–99)
HCT VFR BLD CALC: 32.6 % — LOW (ref 37–47)
HGB BLD-MCNC: 10.4 G/DL — LOW (ref 12–16)
HYPOCHROMIA BLD QL: SLIGHT — SIGNIFICANT CHANGE UP
LYMPHOCYTES # BLD AUTO: 0.56 K/UL — LOW (ref 1.2–3.4)
LYMPHOCYTES # BLD AUTO: 14.9 % — LOW (ref 20.5–51.1)
MANUAL SMEAR VERIFICATION: SIGNIFICANT CHANGE UP
MCHC RBC-ENTMCNC: 27.2 PG — SIGNIFICANT CHANGE UP (ref 27–31)
MCHC RBC-ENTMCNC: 31.9 G/DL — LOW (ref 32–37)
MCV RBC AUTO: 85.1 FL — SIGNIFICANT CHANGE UP (ref 81–99)
MICROCYTES BLD QL: SLIGHT — SIGNIFICANT CHANGE UP
MONOCYTES # BLD AUTO: 0.5 K/UL — SIGNIFICANT CHANGE UP (ref 0.1–0.6)
MONOCYTES NFR BLD AUTO: 13.2 % — HIGH (ref 1.7–9.3)
NEUTROPHILS # BLD AUTO: 2.43 K/UL — SIGNIFICANT CHANGE UP (ref 1.4–6.5)
NEUTROPHILS NFR BLD AUTO: 64 % — SIGNIFICANT CHANGE UP (ref 42.2–75.2)
PLAT MORPH BLD: NORMAL — SIGNIFICANT CHANGE UP
PLATELET # BLD AUTO: 182 K/UL — SIGNIFICANT CHANGE UP (ref 130–400)
POTASSIUM SERPL-MCNC: 4.2 MMOL/L — SIGNIFICANT CHANGE UP (ref 3.5–5)
POTASSIUM SERPL-SCNC: 4.2 MMOL/L — SIGNIFICANT CHANGE UP (ref 3.5–5)
RBC # BLD: 3.83 M/UL — LOW (ref 4.2–5.4)
RBC # FLD: 14.1 % — SIGNIFICANT CHANGE UP (ref 11.5–14.5)
RBC BLD AUTO: ABNORMAL
SODIUM SERPL-SCNC: 144 MMOL/L — SIGNIFICANT CHANGE UP (ref 135–146)
SPECIMEN SOURCE: SIGNIFICANT CHANGE UP
VARIANT LYMPHS # BLD: 3.5 % — SIGNIFICANT CHANGE UP (ref 0–5)
WBC # BLD: 3.79 K/UL — LOW (ref 4.8–10.8)
WBC # FLD AUTO: 3.79 K/UL — LOW (ref 4.8–10.8)

## 2020-02-18 PROCEDURE — 99233 SBSQ HOSP IP/OBS HIGH 50: CPT

## 2020-02-18 RX ORDER — ASPIRIN/CALCIUM CARB/MAGNESIUM 324 MG
81 TABLET ORAL DAILY
Refills: 0 | Status: DISCONTINUED | OUTPATIENT
Start: 2020-02-18 | End: 2020-02-19

## 2020-02-18 RX ORDER — ENOXAPARIN SODIUM 100 MG/ML
40 INJECTION SUBCUTANEOUS AT BEDTIME
Refills: 0 | Status: DISCONTINUED | OUTPATIENT
Start: 2020-02-18 | End: 2020-02-19

## 2020-02-18 RX ADMIN — SODIUM CHLORIDE 100 MILLILITER(S): 9 INJECTION INTRAMUSCULAR; INTRAVENOUS; SUBCUTANEOUS at 05:52

## 2020-02-18 RX ADMIN — Medication 200 MILLIGRAM(S): at 21:41

## 2020-02-18 RX ADMIN — ATORVASTATIN CALCIUM 40 MILLIGRAM(S): 80 TABLET, FILM COATED ORAL at 21:41

## 2020-02-18 RX ADMIN — Medication 90 MILLIGRAM(S): at 10:38

## 2020-02-18 RX ADMIN — Medication 200 MILLIGRAM(S): at 10:38

## 2020-02-18 RX ADMIN — Medication 81 MILLIGRAM(S): at 17:03

## 2020-02-18 RX ADMIN — SERTRALINE 100 MILLIGRAM(S): 25 TABLET, FILM COATED ORAL at 11:41

## 2020-02-18 RX ADMIN — CHLORHEXIDINE GLUCONATE 1 APPLICATION(S): 213 SOLUTION TOPICAL at 05:52

## 2020-02-18 NOTE — PROGRESS NOTE ADULT - SUBJECTIVE AND OBJECTIVE BOX
Patient is a 84y old  Female who presents with a chief complaint of vomiting and diarrhea (18 Feb 2020 10:34)    Patient was seen and examined.  overnight had 6-7 episodes of diarrhea  tolerating liquid diet  Denies abd pain, nausea, Vomitting  Denies any other complaints.  All systems reviewed positive history as mentioned above.    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 1000 milliLiter(s) (100 mL/Hr) IV Continuous <Continuous>    Vital Signs Last 24 Hrs  T(C): 36.1  T(F): 96.9  HR: 80  BP: 153/68  BP(mean): --  RR: 18  SpO2: 93%    O/E:  Awake, alert, not in distress.  HEENT: atraumatic, EOMI.  Chest: decreased breath sounds at bases  CVS: SIS2 +, no murmur.  P/A: Soft, BS+  CNS: residual right sided weakness  Ext: no edema feet.  Skin: no rash, no ulcers.  All systems reviewed positive findings as above.      POCT Blood Glucose.: 296 mg/dL (16 Feb 2020 20:42)                          10.4<L>  3.79<L> )-----------( 182      ( 18 Feb 2020 08:59 )             32.6<L>                        10.5<L>  4.76<L> )-----------( 186      ( 17 Feb 2020 07:17 )             32.5<L>    02-18    144  |  111<H>  |  17  ----------------------------<  104<H>  4.2   |  22  |  0.8  02-17    140  |  107  |  30<H>  ----------------------------<  89  4.8   |  22  |  1.1    Ca    8.6      18 Feb 2020 08:59  Ca    8.6      17 Feb 2020 07:17  Ca    8.5      17 Feb 2020 00:28  Ca    9.3      16 Feb 2020 16:25  Phos  3.7     02-17  Mg     1.9     02-17                  GI PCR Panel, Stool (collected 17 Feb 2020 19:15)  Source: .Stool Feces  Final Report (18 Feb 2020 04:45):    Norovirus GI/GII    DETECTED by PCR    *******Please Note:*******    GI panel PCR evaluates for:    Campylobacter, Plesiomonas shigelloides, Salmonella,    Vibrio, Yersinia enterocolitica, Enteroaggregative    Escherichia coli (EAEC), Enteropathogenic E.coli (EPEC),    Enterotoxigenic E. coli (ETEC) lt/st, Shiga-like    toxin-producing E. coli (STEC) stx1/stx2,    Shigella/ Enteroinvasive E. coli (EIEC), Cryptosporidium,    Cyclospora cayetanensis, Entamoeba histolytica,    Giardia lamblia, Adenovirus F 40/41, Astrovirus,    Norovirus GI/GII, Rotavirus A, Sapovirus

## 2020-02-18 NOTE — DIETITIAN INITIAL EVALUATION ADULT. - RD TO REMAIN AVAILABLE
Interventions: Melas and snacks, medical food supplement. Monitor/Evaluate: RD to monitor energy intake, diet order, body comp, NFPF., electrolytes profile./yes Interventions: Meals and snacks, medical food supplement. Monitor/Evaluate: RD to monitor energy intake, diet order, body comp, NFPF, electrolytes profile./yes Interventions: Meals and snacks, medical food supplement, nutrition related medication management. Monitor/Evaluate: RD to monitor energy intake, diet order, body comp, NFPF, electrolytes profile./yes

## 2020-02-18 NOTE — DISCHARGE NOTE PROVIDER - NSDCFUADDINST_GEN_ALL_CORE_FT
Follow up with your PMD in 1-2 weeks. Please discuss with your PMD your overall condition specifically surrounding your current situation. Follow up with your PMD in 1-2 weeks. Please discuss with your PMD your overall condition specifically surrounding your current situation.    Please note, we started you on aspirin for an old stroke. Please f/u with your PMD

## 2020-02-18 NOTE — DIETITIAN INITIAL EVALUATION ADULT. - ENERGY NEEDS
Calories: 9595-4778 kcal/day (25-30 kcal/kg of CBW for BMI of 18)  Protein: 52-73 g/day (1.2-1.4 g/kg of CBW for BMI 18 and to preserve LBM)  Fluids: 1 ml/kcal or per LIP

## 2020-02-18 NOTE — DISCHARGE NOTE PROVIDER - CARE PROVIDER_API CALL
Nahum Silva)  Pediatrics Medicine  [address] [place] [address]  Phone: [phone]  Fax: [phone]  Follow Up Time:

## 2020-02-18 NOTE — DISCHARGE NOTE PROVIDER - NSDCCPCAREPLAN_GEN_ALL_CORE_FT
PRINCIPAL DISCHARGE DIAGNOSIS  Diagnosis: Gastroenteritis  Assessment and Plan of Treatment: She was admitted for acute gastroenteritis - norovirus detected in stool. C.diff negative and stool PCR negative  She was started on iv hydration at 100ml/hour.   Diet was advanced as tolerated.        SECONDARY DISCHARGE DIAGNOSES  Diagnosis: MARY (acute kidney injury)  Assessment and Plan of Treatment: She also had a mild MARY on admission that resolved with hydration    Diagnosis: Diarrhea  Assessment and Plan of Treatment:     Diagnosis: Emesis  Assessment and Plan of Treatment: PRINCIPAL DISCHARGE DIAGNOSIS  Diagnosis: Gastroenteritis  Assessment and Plan of Treatment: She was admitted for acute gastroenteritis - norovirus detected in stool. C.diff negative and stool PCR negative  She was started on iv hydration at 100ml/hour.   Diet was advanced as tolerated.        SECONDARY DISCHARGE DIAGNOSES  Diagnosis: Ischemic stroke  Assessment and Plan of Treatment: Please note, we started you on aspirin for an old stroke. Please f/u with your PMD    Diagnosis: MARY (acute kidney injury)  Assessment and Plan of Treatment: She also had a mild MARY on admission that resolved with hydration    Diagnosis: Diarrhea  Assessment and Plan of Treatment:     Diagnosis: Emesis  Assessment and Plan of Treatment:

## 2020-02-18 NOTE — DIETITIAN INITIAL EVALUATION ADULT. - PHYSICAL APPEARANCE
underweight/BMI 18; appears thin-as per daughter pt has always been thin. Alert & Oriented x4; pt has dentures but denies any chewing/swallowing difficulties, no edema noted, no n/v or constipation but reports diarrhea (pt had 5-6 watery BM today as per daughter), LBM-2/18; skin- intact, BS-15

## 2020-02-18 NOTE — PROGRESS NOTE ADULT - ASSESSMENT
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 Patient was visiting her  in assisted living 2 days ago and when she went back home developed non-bilious non-bloody vomiting and diarrhea that has not resolved over past 36 hours. Her daughter who was with her also developed vomiting.    # Viral gastroenteritis  - Stool PCR: norovirus  - C/w IV fluids    # Acute kidney injury on CKD stage 2 -resolved    # Hypertension  - labetalol, procardia  - enalapril held    # H/o CVA with redual right sided weakness  - c/w statin  - pt not on ASA??    # Dyslipidemia  - c/w statin    # H/o depression  - c/w home meds    # DVT prophylaxis    # Full code    #Pending: clinical improvement  # Discussed plan of care with patient and her daughter  # Disposition: Home when stable

## 2020-02-18 NOTE — DISCHARGE NOTE PROVIDER - HOSPITAL COURSE
83 yo female PMHx hemorrhagic CVA in 2018 with minimal residual RLE weakness, DLD, HTN presented for acute onset vomiting and diarrhea.    She was admitted for acute gastroenteritis - norovirus detected in stool. C.diff negative and stool PCR negative    She was started on iv hydration at 100ml/hour.     Diet was advanced as tolerated.        She also had a mild MARY on admission that resolved with hydration 85 yo female PMHx hemorrhagic CVA in 2018 with minimal residual RLE weakness, DLD, HTN presented for acute onset vomiting and diarrhea.    She was admitted for acute gastroenteritis - norovirus detected in stool. C.diff negative and stool PCR negative    She was started on iv hydration at 100ml/hour.     Diet was advanced as tolerated.        She also had a mild MARY on admission that resolved with hydration         # Viral gastroenteritis-resolved    - Stool PCR: norovirus    - tolerating advance diet        # Acute kidney injury on CKD stage 2 -resolved        # Hypertension    - labetalol, procardia, enalapril         # H/o CVA with redual right sided weakness    - c/w statin, ASa            # Dyslipidemia    - c/w statin        # H/o depression    - c/w home meds

## 2020-02-18 NOTE — DIETITIAN INITIAL EVALUATION ADULT. - OTHER INFO
Pertinent Medical Information: p/w chief complaint of diarrhea and vomiting with primary diagnosis of MARY. Acute gastroenteritis- norovirus detected in stool. C.diff negative and stool PCR negative continue IV hydration, C diff negative. Vomiting resolved; Still with diarrhea. MARY -resolved and  baseline creatinine.    Pertinent subjective Information: Pt was on clear liquid diet for 3 days d/t vomiting and diarrhea and diet was advanced to above diet today. Pt and daughter at b/s report poor appetite since admission d/t diarrhea. As per daughter, pt currently consuming <50% of meals. Pt had good appetite PTA; having 3 meals+ snacks UBW of 115# and denies any changes in wt recently. No supplement use reported PTA. No NKFA. No cultural/Christianity food preferences. Followed regular diet PTA. Pt agreed to try Ensure Clear BID. Pt does not show any physical signs of wasting at this time. Pertinent Medical Information: p/w chief complaint of diarrhea and vomiting with primary diagnosis of MARY. Acute gastroenteritis- norovirus detected in stool. C.diff negative and stool PCR negative continue IV hydration, C diff negative. Vomiting resolved; Still with diarrhea. MARY -resolved and  baseline creatinine.    Pertinent subjective Information: Pt was on clear liquid diet for 3 days d/t vomiting and diarrhea and diet was advanced to above diet today. Pt and daughter at b/s report poor appetite since admission d/t diarrhea. As per daughter, pt currently consuming <50% of meals. Pt had good appetite PTA; having 3 meals+ snacks. UBW of 115# and denies any changes in wt recently. No supplement use reported PTA. No NKFA. No cultural/Holiness food preferences. Followed regular diet PTA. Pt agreed to try Ensure Clear BID. Pt does not show any physical signs of wasting at this time.

## 2020-02-18 NOTE — DIETITIAN INITIAL EVALUATION ADULT. - ADD RECOMMEND
1. Continue current diet order of soft, fiber/residue restricted. 2. Add Ensure Clear BID. 3. Consider adding imodium for diarrhea as PRN. 1. Continue current diet order of soft, fiber/residue restricted. 2. Add Ensure Clear BID. 3. Consider adding imodium for diarrhea and acidophilus.

## 2020-02-18 NOTE — PROGRESS NOTE ADULT - SUBJECTIVE AND OBJECTIVE BOX
EMILIANO ANAYA 84y Female  MRN#: [id number]     SUBJECTIVE  Patient is a 84y old Female who presents with a chief complaint of vomiting and diarrhea (17 Feb 2020 15:51)  Currently admitted to medicine with the primary diagnosis of MARY (acute kidney injury)  Today is hospital day 2d, and this morning she reports several bouts of watery diarrhea overnight. Otherwise no issues. Tolerating full fluid diet.     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 1000 milliLiter(s) IV Continuous <Continuous>    PRN MEDICATIONS      VITAL SIGNS: Last 24 Hours  T(C): 36.1 (18 Feb 2020 05:59), Max: 37.2 (17 Feb 2020 15:27)  T(F): 96.9 (18 Feb 2020 05:59), Max: 99 (17 Feb 2020 15:27)  HR: 78 (18 Feb 2020 05:59) (69 - 78)  BP: 164/72 (18 Feb 2020 05:59) (127/58 - 164/72)  BP(mean): --  RR: 18 (18 Feb 2020 05:59) (18 - 18)  SpO2: 93% (17 Feb 2020 15:27) (93% - 93%)    LABS:                         GI PCR Panel, Stool (collected 17 Feb 2020 19:15)  Source: .Stool Feces  Final Report (18 Feb 2020 04:45):    Norovirus GI/GII    DETECTED by PCR    *******Please Note:*******    GI panel PCR evaluates for:    Campylobacter, Plesiomonas shigelloides, Salmonella,    Vibrio, Yersinia enterocolitica, Enteroaggregative    Escherichia coli (EAEC), Enteropathogenic E.coli (EPEC),    Enterotoxigenic E. coli (ETEC) lt/st, Shiga-like    toxin-producing E. coli (STEC) stx1/stx2,    Shigella/ Enteroinvasive E. coli (EIEC), Cryptosporidium,    Cyclospora cayetanensis, Entamoeba histolytica,    Giardia lamblia, Adenovirus F 40/41, Astrovirus,    Norovirus GI/GII, Rotavirus A, Sapovirus      PHYSICAL EXAM:    GENERAL: NAD, well-developed, AAOx3, thin   HEENT:  Atraumatic, Normocephalic. EOMI, PERRLA, conjunctiva and sclera clear, No JVD  PULMONARY: Clear to auscultation bilaterally; No wheeze  CARDIOVASCULAR: Regular rate and rhythm; No murmurs, rubs, or gallops  GASTROINTESTINAL: Soft, Nontender, Nondistended; Bowel sounds present  MUSCULOSKELETAL:  2+ Peripheral Pulses, No clubbing, cyanosis, or edema  NEUROLOGY: non-focal  SKIN: No rashes or lesions      ASSESSMENT & PLAN  83 yo female PMHx hemorrhagic CVA in 2018 with minimal residual RLE weakness, DLD, HTN presented for acute onset vomiting and diarrhea.    # Acute gastroenteritis - likely viral. C.diff negative and stool PCR negative  - iv hydration at 100ml/hour. If she is tolerating po intake can decrease rate then stop  - Vomiting resolved; Still with diarrhea  -soft diet today, low fiber    # MARY - resolved - pre-renal from  vomiting and diarrhea  - Baseline creatinine ~0.7    # HTN - Continue Procardia and Labetalol; hold ACEI for now  - can resume ACE-I if cr remains stable    DVT prophylaxis: Lovenox   Diet: Diet, Soft (02-18-20 @ 07:05)  Dispo: home once diarrhea improved and tolerating oral intake   Ambulation: as tolerated. EMILIANO ANAYA 84y Female  MRN#: [id number]     SUBJECTIVE  Patient is a 84y old Female who presents with a chief complaint of vomiting and diarrhea (17 Feb 2020 15:51)  Currently admitted to medicine with the primary diagnosis of MARY (acute kidney injury)  Today is hospital day 2d, and this morning she reports several bouts of watery diarrhea overnight. Otherwise no issues. Tolerating full fluid diet.     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 1000 milliLiter(s) IV Continuous <Continuous>    PRN MEDICATIONS      VITAL SIGNS: Last 24 Hours  T(C): 36.1 (18 Feb 2020 05:59), Max: 37.2 (17 Feb 2020 15:27)  T(F): 96.9 (18 Feb 2020 05:59), Max: 99 (17 Feb 2020 15:27)  HR: 78 (18 Feb 2020 05:59) (69 - 78)  BP: 164/72 (18 Feb 2020 05:59) (127/58 - 164/72)  BP(mean): --  RR: 18 (18 Feb 2020 05:59) (18 - 18)  SpO2: 93% (17 Feb 2020 15:27) (93% - 93%)    LABS:                         10.4   x     )-----------( 182      ( 18 Feb 2020 08:59 )             32.6     02-18    144  |  111<H>  |  17  ----------------------------<  104<H>  4.2   |  22  |  0.8    Ca    8.6      18 Feb 2020 08:59  Phos  3.7     02-17  Mg     1.9     02-17    TPro  7.6  /  Alb  5.0  /  TBili  0.3  /  DBili  x   /  AST  18  /  ALT  15  /  AlkPhos  70  02-16              GI PCR Panel, Stool (collected 17 Feb 2020 19:15)  Source: .Stool Feces  Final Report (18 Feb 2020 04:45):    Norovirus GI/GII    DETECTED by PCR    *******Please Note:*******    GI panel PCR evaluates for:    Campylobacter, Plesiomonas shigelloides, Salmonella,    Vibrio, Yersinia enterocolitica, Enteroaggregative    Escherichia coli (EAEC), Enteropathogenic E.coli (EPEC),    Enterotoxigenic E. coli (ETEC) lt/st, Shiga-like    toxin-producing E. coli (STEC) stx1/stx2,    Shigella/ Enteroinvasive E. coli (EIEC), Cryptosporidium,    Cyclospora cayetanensis, Entamoeba histolytica,    Giardia lamblia, Adenovirus F 40/41, Astrovirus,    Norovirus GI/GII, Rotavirus A, Sapovirus      PHYSICAL EXAM:    GENERAL: NAD, well-developed, AAOx3, thin   HEENT:  Atraumatic, Normocephalic. EOMI, PERRLA, conjunctiva and sclera clear, No JVD  PULMONARY: Clear to auscultation bilaterally; No wheeze  CARDIOVASCULAR: Regular rate and rhythm; No murmurs, rubs, or gallops  GASTROINTESTINAL: Soft, Nontender, Nondistended; Bowel sounds present  MUSCULOSKELETAL:  2+ Peripheral Pulses, No clubbing, cyanosis, or edema  NEUROLOGY: non-focal  SKIN: No rashes or lesions      ASSESSMENT & PLAN  85 yo female PMHx hemorrhagic CVA in 2018 with minimal residual RLE weakness, DLD, HTN presented for acute onset vomiting and diarrhea.    # Acute gastroenteritis - norovirus detected in stool. C.diff negative and stool PCR negative  - iv hydration at 100ml/hour. If she is tolerating po intake can decrease rate then stop  - Vomiting resolved; Still with diarrhea  -soft diet today, low fiber    # MARY - resolved - pre-renal from  vomiting and diarrhea  - Baseline creatinine ~0.7    # HTN - Continue Procardia and Labetalol; hold ACEI for now  - can resume ACE-I if cr remains stable    DVT prophylaxis: Lovenox   Diet: Diet, Soft (02-18-20 @ 07:05)  Dispo: home once diarrhea improved and tolerating oral intake   Ambulation: as tolerated.

## 2020-02-18 NOTE — DISCHARGE NOTE PROVIDER - NSDCMRMEDTOKEN_GEN_ALL_CORE_FT
atorvastatin 40 mg oral tablet: 1 tab(s) orally once a day (at bedtime)  docusate sodium 100 mg oral capsule: 1 cap(s) orally once a day  enalapril 2.5 mg oral tablet: 1 tab(s) orally once a day at noon  labetalol 200 mg oral tablet: 1 tab(s) orally 2 times a day  NIFEdipine 90 mg oral tablet, extended release: 1 tab(s) orally once a day  raNITIdine 150 mg oral capsule: orally once a day  senna oral tablet: 2 tab(s) orally once a day (at bedtime)  sertraline 100 mg oral tablet: 1 tab(s) orally once a day aspirin 81 mg oral tablet, chewable: 1 tab(s) orally once a day  atorvastatin 40 mg oral tablet: 1 tab(s) orally once a day (at bedtime)  enalapril 2.5 mg oral tablet: 1 tab(s) orally once a day at noon  labetalol 200 mg oral tablet: 1 tab(s) orally 2 times a day  NIFEdipine 90 mg oral tablet, extended release: 1 tab(s) orally once a day  raNITIdine 150 mg oral capsule: orally once a day  sertraline 100 mg oral tablet: 1 tab(s) orally once a day

## 2020-02-19 ENCOUNTER — TRANSCRIPTION ENCOUNTER (OUTPATIENT)
Age: 85
End: 2020-02-19

## 2020-02-19 VITALS
HEART RATE: 75 BPM | TEMPERATURE: 97 F | DIASTOLIC BLOOD PRESSURE: 67 MMHG | RESPIRATION RATE: 18 BRPM | SYSTOLIC BLOOD PRESSURE: 157 MMHG

## 2020-02-19 LAB
ANION GAP SERPL CALC-SCNC: 12 MMOL/L — SIGNIFICANT CHANGE UP (ref 7–14)
BUN SERPL-MCNC: 10 MG/DL — SIGNIFICANT CHANGE UP (ref 10–20)
CALCIUM SERPL-MCNC: 8.8 MG/DL — SIGNIFICANT CHANGE UP (ref 8.5–10.1)
CHLORIDE SERPL-SCNC: 111 MMOL/L — HIGH (ref 98–110)
CO2 SERPL-SCNC: 22 MMOL/L — SIGNIFICANT CHANGE UP (ref 17–32)
CREAT SERPL-MCNC: 0.6 MG/DL — LOW (ref 0.7–1.5)
GLUCOSE SERPL-MCNC: 111 MG/DL — HIGH (ref 70–99)
MAGNESIUM SERPL-MCNC: 1.7 MG/DL — LOW (ref 1.8–2.4)
POTASSIUM SERPL-MCNC: 4 MMOL/L — SIGNIFICANT CHANGE UP (ref 3.5–5)
POTASSIUM SERPL-SCNC: 4 MMOL/L — SIGNIFICANT CHANGE UP (ref 3.5–5)
SODIUM SERPL-SCNC: 145 MMOL/L — SIGNIFICANT CHANGE UP (ref 135–146)

## 2020-02-19 PROCEDURE — 99239 HOSP IP/OBS DSCHRG MGMT >30: CPT

## 2020-02-19 RX ORDER — ASPIRIN/CALCIUM CARB/MAGNESIUM 324 MG
1 TABLET ORAL
Qty: 30 | Refills: 0
Start: 2020-02-19 | End: 2020-03-19

## 2020-02-19 RX ADMIN — Medication 81 MILLIGRAM(S): at 11:27

## 2020-02-19 RX ADMIN — Medication 90 MILLIGRAM(S): at 09:12

## 2020-02-19 RX ADMIN — Medication 2.5 MILLIGRAM(S): at 11:41

## 2020-02-19 RX ADMIN — CHLORHEXIDINE GLUCONATE 1 APPLICATION(S): 213 SOLUTION TOPICAL at 06:10

## 2020-02-19 RX ADMIN — SERTRALINE 100 MILLIGRAM(S): 25 TABLET, FILM COATED ORAL at 11:27

## 2020-02-19 RX ADMIN — Medication 200 MILLIGRAM(S): at 09:12

## 2020-02-19 RX ADMIN — SODIUM CHLORIDE 100 MILLILITER(S): 9 INJECTION INTRAMUSCULAR; INTRAVENOUS; SUBCUTANEOUS at 02:41

## 2020-02-19 NOTE — CHART NOTE - NSCHARTNOTEFT_GEN_A_CORE
<<<RESIDENT DISCHARGE NOTE>>>     EMILIANO ANAYA  MRN-[id number]    VITAL SIGNS:  T(F): 96.8 (02-19-20 @ 07:05), Max: 98.6 (02-18-20 @ 15:14)  HR: 75 (02-19-20 @ 07:05)  BP: 157/67 (02-19-20 @ 07:05)  SpO2: --      PHYSICAL EXAMINATION:  GENERAL: NAD, well-developed, AAOx3, thin   HEENT:  Atraumatic, Normocephalic. EOMI, PERRLA, conjunctiva and sclera clear, No JVD  PULMONARY: Clear to auscultation bilaterally; No wheeze  CARDIOVASCULAR: Regular rate and rhythm; No murmurs, rubs, or gallops  GASTROINTESTINAL: Soft, Nontender, Nondistended; Bowel sounds present  MUSCULOSKELETAL:  2+ Peripheral Pulses, No clubbing, cyanosis, or edema  NEUROLOGY: non-focal    TEST RESULTS:        FINAL DISCHARGE INTERVIEW:  Resident(s) Present: (Name:_______Mariama Wallace______), RN Present: (Name:  ___________)    DISCHARGE MEDICATION RECONCILIATION  reviewed with Attending (Name:____Charito_______)    DISPOSITION:   [ x ] Home,    [  ] Home with Visiting Nursing Services,   [    ]  SNF/ NH,    [   ] Acute Rehab (4A),   [   ] Other (Specify:_________) <<<RESIDENT DISCHARGE NOTE>>>     EMILIANO ANAYA  MRN-[id number]    VITAL SIGNS:  T(F): 96.8 (02-19-20 @ 07:05), Max: 98.6 (02-18-20 @ 15:14)  HR: 75 (02-19-20 @ 07:05)  BP: 157/67 (02-19-20 @ 07:05)  SpO2: --      PHYSICAL EXAMINATION:  GENERAL: NAD, well-developed, AAOx3, thin   HEENT:  Atraumatic, Normocephalic. EOMI, PERRLA, conjunctiva and sclera clear, No JVD  PULMONARY: Clear to auscultation bilaterally; No wheeze  CARDIOVASCULAR: Regular rate and rhythm; No murmurs, rubs, or gallops  GASTROINTESTINAL: Soft, Nontender, Nondistended; Bowel sounds present  MUSCULOSKELETAL:  2+ Peripheral Pulses, No clubbing, cyanosis, or edema  NEUROLOGY: non-focal    TEST RESULTS:                        10.4   3.79  )-----------( 182      ( 18 Feb 2020 08:59 )             32.6   02-18    144  |  111<H>  |  17  ----------------------------<  104<H>  4.2   |  22  |  0.8    Ca    8.6      18 Feb 2020 08:59          FINAL DISCHARGE INTERVIEW:  Resident(s) Present: (Name:_______Mariama Wallace______), RN Present: (Name:  ___________)    DISCHARGE MEDICATION RECONCILIATION  reviewed with Attending (Name:____Charito_______)    DISPOSITION:   [ x ] Home,    [  ] Home with Visiting Nursing Services,   [    ]  SNF/ NH,    [   ] Acute Rehab (4A),   [   ] Other (Specify:_________)

## 2020-02-19 NOTE — DISCHARGE NOTE NURSING/CASE MANAGEMENT/SOCIAL WORK - PATIENT PORTAL LINK FT
You can access the FollowMyHealth Patient Portal offered by [facility] by registering at the following website: [email or web address]. By joining [employer] [facility] [employer]’s FollowMyHealth portal, you will also be able to view your health information using other applications (apps) compatible with our system.

## 2020-02-19 NOTE — DISCHARGE NOTE NURSING/CASE MANAGEMENT/SOCIAL WORK - NSDCPEPTSTRK_GEN_ALL_CORE
Call 911 for stroke/Prescribed medications/Risk factors for stroke/Stroke support groups for patients, families, and friends/Need for follow up after discharge/Stroke education booklet/Stroke warning signs and symptoms/Signs and symptoms of stroke

## 2020-02-24 DIAGNOSIS — N18.2 CHRONIC KIDNEY DISEASE, STAGE 2 (MILD): ICD-10-CM

## 2020-02-24 DIAGNOSIS — Z88.2 ALLERGY STATUS TO SULFONAMIDES: ICD-10-CM

## 2020-02-24 DIAGNOSIS — E78.5 HYPERLIPIDEMIA, UNSPECIFIED: ICD-10-CM

## 2020-02-24 DIAGNOSIS — F32.9 MAJOR DEPRESSIVE DISORDER, SINGLE EPISODE, UNSPECIFIED: ICD-10-CM

## 2020-02-24 DIAGNOSIS — E86.0 DEHYDRATION: ICD-10-CM

## 2020-02-24 DIAGNOSIS — A08.4 VIRAL INTESTINAL INFECTION, UNSPECIFIED: ICD-10-CM

## 2020-02-24 DIAGNOSIS — R11.10 VOMITING, UNSPECIFIED: ICD-10-CM

## 2020-02-24 DIAGNOSIS — E87.5 HYPERKALEMIA: ICD-10-CM

## 2020-02-24 DIAGNOSIS — N17.9 ACUTE KIDNEY FAILURE, UNSPECIFIED: ICD-10-CM

## 2020-02-24 DIAGNOSIS — I12.9 HYPERTENSIVE CHRONIC KIDNEY DISEASE WITH STAGE 1 THROUGH STAGE 4 CHRONIC KIDNEY DISEASE, OR UNSPECIFIED CHRONIC KIDNEY DISEASE: ICD-10-CM

## 2020-02-24 DIAGNOSIS — I69.341 MONOPLEGIA OF LOWER LIMB FOLLOWING CEREBRAL INFARCTION AFFECTING RIGHT DOMINANT SIDE: ICD-10-CM

## 2020-02-24 DIAGNOSIS — Z88.8 ALLERGY STATUS TO OTHER DRUGS, MEDICAMENTS AND BIOLOGICAL SUBSTANCES STATUS: ICD-10-CM

## 2020-02-26 ENCOUNTER — INPATIENT (INPATIENT)
Facility: HOSPITAL | Age: 85
LOS: 1 days | Discharge: HOME | End: 2020-02-28
Attending: INTERNAL MEDICINE | Admitting: INTERNAL MEDICINE
Payer: MEDICARE

## 2020-02-26 VITALS
HEART RATE: 70 BPM | DIASTOLIC BLOOD PRESSURE: 56 MMHG | WEIGHT: 119.93 LBS | OXYGEN SATURATION: 91 % | SYSTOLIC BLOOD PRESSURE: 121 MMHG | HEIGHT: 67 IN | TEMPERATURE: 99 F | RESPIRATION RATE: 18 BRPM

## 2020-02-26 DIAGNOSIS — J43.2 CENTRILOBULAR EMPHYSEMA: ICD-10-CM

## 2020-02-26 DIAGNOSIS — J98.11 ATELECTASIS: ICD-10-CM

## 2020-02-26 DIAGNOSIS — I11.0 HYPERTENSIVE HEART DISEASE WITH HEART FAILURE: ICD-10-CM

## 2020-02-26 DIAGNOSIS — E78.5 HYPERLIPIDEMIA, UNSPECIFIED: ICD-10-CM

## 2020-02-26 DIAGNOSIS — Z99.3 DEPENDENCE ON WHEELCHAIR: ICD-10-CM

## 2020-02-26 DIAGNOSIS — I50.32 CHRONIC DIASTOLIC (CONGESTIVE) HEART FAILURE: ICD-10-CM

## 2020-02-26 DIAGNOSIS — R06.00 DYSPNEA, UNSPECIFIED: ICD-10-CM

## 2020-02-26 DIAGNOSIS — Z88.2 ALLERGY STATUS TO SULFONAMIDES: ICD-10-CM

## 2020-02-26 DIAGNOSIS — I69.951 HEMIPLEGIA AND HEMIPARESIS FOLLOWING UNSPECIFIED CEREBROVASCULAR DISEASE AFFECTING RIGHT DOMINANT SIDE: ICD-10-CM

## 2020-02-26 DIAGNOSIS — J90 PLEURAL EFFUSION, NOT ELSEWHERE CLASSIFIED: ICD-10-CM

## 2020-02-26 DIAGNOSIS — J44.1 CHRONIC OBSTRUCTIVE PULMONARY DISEASE WITH (ACUTE) EXACERBATION: ICD-10-CM

## 2020-02-26 DIAGNOSIS — Z87.891 PERSONAL HISTORY OF NICOTINE DEPENDENCE: ICD-10-CM

## 2020-02-26 DIAGNOSIS — E87.5 HYPERKALEMIA: ICD-10-CM

## 2020-02-26 PROBLEM — I63.213 CEREBRAL INFARCTION DUE TO UNSPECIFIED OCCLUSION OR STENOSIS OF BILATERAL VERTEBRAL ARTERIES: Chronic | Status: ACTIVE | Noted: 2020-02-16

## 2020-02-26 LAB
ALBUMIN SERPL ELPH-MCNC: 4 G/DL — SIGNIFICANT CHANGE UP (ref 3.5–5.2)
ALP SERPL-CCNC: 60 U/L — SIGNIFICANT CHANGE UP (ref 30–115)
ALT FLD-CCNC: 9 U/L — SIGNIFICANT CHANGE UP (ref 0–41)
ANION GAP SERPL CALC-SCNC: 14 MMOL/L — SIGNIFICANT CHANGE UP (ref 7–14)
APTT BLD: 28.3 SEC — SIGNIFICANT CHANGE UP (ref 27–39.2)
AST SERPL-CCNC: 13 U/L — SIGNIFICANT CHANGE UP (ref 0–41)
BASE EXCESS BLDV CALC-SCNC: 1.5 MMOL/L — SIGNIFICANT CHANGE UP (ref -2–2)
BASOPHILS # BLD AUTO: 0.06 K/UL — SIGNIFICANT CHANGE UP (ref 0–0.2)
BASOPHILS NFR BLD AUTO: 0.9 % — SIGNIFICANT CHANGE UP (ref 0–1)
BILIRUB SERPL-MCNC: 0.2 MG/DL — SIGNIFICANT CHANGE UP (ref 0.2–1.2)
BUN SERPL-MCNC: 13 MG/DL — SIGNIFICANT CHANGE UP (ref 10–20)
CA-I SERPL-SCNC: 1.21 MMOL/L — SIGNIFICANT CHANGE UP (ref 1.12–1.3)
CALCIUM SERPL-MCNC: 9 MG/DL — SIGNIFICANT CHANGE UP (ref 8.5–10.1)
CHLORIDE SERPL-SCNC: 104 MMOL/L — SIGNIFICANT CHANGE UP (ref 98–110)
CO2 SERPL-SCNC: 24 MMOL/L — SIGNIFICANT CHANGE UP (ref 17–32)
CREAT SERPL-MCNC: 0.8 MG/DL — SIGNIFICANT CHANGE UP (ref 0.7–1.5)
EOSINOPHIL # BLD AUTO: 0.25 K/UL — SIGNIFICANT CHANGE UP (ref 0–0.7)
EOSINOPHIL NFR BLD AUTO: 3.5 % — SIGNIFICANT CHANGE UP (ref 0–8)
GAS PNL BLDV: 143 MMOL/L — SIGNIFICANT CHANGE UP (ref 136–145)
GAS PNL BLDV: SIGNIFICANT CHANGE UP
GAS PNL BLDV: SIGNIFICANT CHANGE UP
GIANT PLATELETS BLD QL SMEAR: PRESENT — SIGNIFICANT CHANGE UP
GLUCOSE SERPL-MCNC: 96 MG/DL — SIGNIFICANT CHANGE UP (ref 70–99)
HCO3 BLDV-SCNC: 26 MMOL/L — SIGNIFICANT CHANGE UP (ref 22–29)
HCT VFR BLD CALC: 34.2 % — LOW (ref 37–47)
HCT VFR BLDA CALC: 42.4 % — SIGNIFICANT CHANGE UP (ref 34–44)
HGB BLD CALC-MCNC: 13.8 G/DL — LOW (ref 14–18)
HGB BLD-MCNC: 10.8 G/DL — LOW (ref 12–16)
INR BLD: 1.14 RATIO — SIGNIFICANT CHANGE UP (ref 0.65–1.3)
LACTATE BLDV-MCNC: 2.1 MMOL/L — HIGH (ref 0.5–1.6)
LYMPHOCYTES # BLD AUTO: 0.75 K/UL — LOW (ref 1.2–3.4)
LYMPHOCYTES # BLD AUTO: 10.5 % — LOW (ref 20.5–51.1)
MAGNESIUM SERPL-MCNC: 1.9 MG/DL — SIGNIFICANT CHANGE UP (ref 1.8–2.4)
MANUAL SMEAR VERIFICATION: SIGNIFICANT CHANGE UP
MCHC RBC-ENTMCNC: 26.7 PG — LOW (ref 27–31)
MCHC RBC-ENTMCNC: 31.6 G/DL — LOW (ref 32–37)
MCV RBC AUTO: 84.4 FL — SIGNIFICANT CHANGE UP (ref 81–99)
MONOCYTES # BLD AUTO: 0.25 K/UL — SIGNIFICANT CHANGE UP (ref 0.1–0.6)
MONOCYTES NFR BLD AUTO: 3.5 % — SIGNIFICANT CHANGE UP (ref 1.7–9.3)
MYELOCYTES NFR BLD: 0.9 % — HIGH (ref 0–0)
NEUTROPHILS # BLD AUTO: 5.79 K/UL — SIGNIFICANT CHANGE UP (ref 1.4–6.5)
NEUTROPHILS NFR BLD AUTO: 80.7 % — HIGH (ref 42.2–75.2)
NT-PROBNP SERPL-SCNC: 960 PG/ML — HIGH (ref 0–300)
PCO2 BLDV: 41 MMHG — SIGNIFICANT CHANGE UP (ref 41–51)
PH BLDV: 7.41 — SIGNIFICANT CHANGE UP (ref 7.26–7.43)
PLAT MORPH BLD: ABNORMAL
PLATELET # BLD AUTO: 283 K/UL — SIGNIFICANT CHANGE UP (ref 130–400)
PO2 BLDV: 25 MMHG — SIGNIFICANT CHANGE UP (ref 20–40)
POIKILOCYTOSIS BLD QL AUTO: SLIGHT — SIGNIFICANT CHANGE UP
POTASSIUM BLDV-SCNC: 4.5 MMOL/L — SIGNIFICANT CHANGE UP (ref 3.3–5.6)
POTASSIUM SERPL-MCNC: 5.2 MMOL/L — HIGH (ref 3.5–5)
POTASSIUM SERPL-SCNC: 5.2 MMOL/L — HIGH (ref 3.5–5)
PROT SERPL-MCNC: 6.3 G/DL — SIGNIFICANT CHANGE UP (ref 6–8)
PROTHROM AB SERPL-ACNC: 13.1 SEC — HIGH (ref 9.95–12.87)
RBC # BLD: 4.05 M/UL — LOW (ref 4.2–5.4)
RBC # FLD: 13.9 % — SIGNIFICANT CHANGE UP (ref 11.5–14.5)
RBC BLD AUTO: ABNORMAL
SAO2 % BLDV: 47 % — SIGNIFICANT CHANGE UP
SODIUM SERPL-SCNC: 142 MMOL/L — SIGNIFICANT CHANGE UP (ref 135–146)
TROPONIN T SERPL-MCNC: <0.01 NG/ML — SIGNIFICANT CHANGE UP
WBC # BLD: 7.17 K/UL — SIGNIFICANT CHANGE UP (ref 4.8–10.8)
WBC # FLD AUTO: 7.17 K/UL — SIGNIFICANT CHANGE UP (ref 4.8–10.8)

## 2020-02-26 PROCEDURE — 71045 X-RAY EXAM CHEST 1 VIEW: CPT | Mod: 26

## 2020-02-26 PROCEDURE — 93970 EXTREMITY STUDY: CPT | Mod: 26

## 2020-02-26 PROCEDURE — 71275 CT ANGIOGRAPHY CHEST: CPT | Mod: 26

## 2020-02-26 PROCEDURE — 99285 EMERGENCY DEPT VISIT HI MDM: CPT

## 2020-02-26 RX ORDER — IPRATROPIUM/ALBUTEROL SULFATE 18-103MCG
3 AEROSOL WITH ADAPTER (GRAM) INHALATION
Refills: 0 | Status: COMPLETED | OUTPATIENT
Start: 2020-02-26 | End: 2020-02-26

## 2020-02-26 RX ORDER — IPRATROPIUM/ALBUTEROL SULFATE 18-103MCG
3 AEROSOL WITH ADAPTER (GRAM) INHALATION EVERY 6 HOURS
Refills: 0 | Status: DISCONTINUED | OUTPATIENT
Start: 2020-02-26 | End: 2020-02-28

## 2020-02-26 RX ADMIN — Medication 3 MILLILITER(S): at 13:54

## 2020-02-26 RX ADMIN — Medication 125 MILLIGRAM(S): at 13:53

## 2020-02-26 NOTE — ED PROVIDER NOTE - CARE PLAN
Principal Discharge DX:	Dyspnea on exertion  Secondary Diagnosis:	COPD exacerbation  Secondary Diagnosis:	Hypoxia

## 2020-02-26 NOTE — ED PROVIDER NOTE - NS ED ROS FT
Constitutional:  no fevers, no chills, no malaise  Eyes:  No visual changes  ENMT: No neck pain or stiffness, no nasal congestion, no ear pain, no throat pain  Cardiac:  No chest pain. + LE swelling.  Respiratory:  see hpi  GI:  No nausea, vomiting, diarrhea or abdominal pain.  :  No dysuria, frequency or burning.  MS:  No back pain, no joint pain.  Neuro:  No headache, no dizziness, no change in mental status  Skin:  No skin rash  Except as documented in the HPI,  all other systems are negative

## 2020-02-26 NOTE — PATIENT PROFILE ADULT - DO YOU FEEL LIKE HURTING OTHERS?
DATE OF SERVICE:  03/03/2017    PROCEDURE:  SUNDAY device removal.    COMPLICATIONS:  None.    BLOOD LOSS:  None.    PROCEDURE IN DETAIL:  Patient after receiving her 10 SUNDAY treatments, the   device was mobilized both clockwise and counterclockwise and then deflated.    Once this was done, the device was removed without difficulty.  The open site   was then cleaned and dressed.  We will have her follow up in 6 weeks or sooner   as needed.  She is planning to see Dr. Washington next week in followup.       ____________________________________     MD ANN LEE / PATITO    DD:  03/03/2017 16:43:55  DT:  03/03/2017 17:15:40    D#:  [id number]  Job#:  [id number]  
no

## 2020-02-26 NOTE — ED PROVIDER NOTE - OBJECTIVE STATEMENT
83 y/o female with h/o htn, hld, hemorrhagic CVA with residual RLE weakness, COPD - not on home O2 or inhalers, daughter states baseline O2 sat is ~ 92-93% - Pt in ER with c/o SOB.  Started today, states she's feeling SOB after exertion and then it resolves.  No SOB at rest.  Denies any CP or palpitations.  mild wet occasional cough.  No f/c.  last night noted that b/l LE's were a little swollen, R>L.  No abd pain.  No ha/dizziness/loc.  Pt was recently admitted to [facility] for 3 days for AGE (stool cx + for norovirus), was d/c'd home 1 week ago.  No n/v/d now.

## 2020-02-26 NOTE — ED PROVIDER NOTE - PHYSICAL EXAMINATION
CONSTITUTIONAL: Well-developed; well-nourished; in no acute distress, nontoxic appearing  SKIN: skin exam is warm and dry,  HEAD: Normocephalic; atraumatic.  EYES: PERRL, 3 mm bilateral, no nystagmus, EOM intact; conjunctiva and sclera clear.  ENT: MMM, no nasal congestion  NECK: Supple; non tender.+ full passive ROM in all directions. No JVD  CARD: S1, S2 normal, no murmur  RESP: No resp distress, speaking full sentences, + decreased BS b/l  ABD: soft; non-distended; non-tender. No Rebound, No guarding  EXT: Normal ROM. mild LE swelling, R foot > L side. Dp Pulses intact.   NEURO: awake, alert, following commands, oriented, grossly unremarkable. No Focal deficits. GCS 15.   PSYCH: Cooperative, appropriate.

## 2020-02-26 NOTE — ED ADULT NURSE NOTE - OBJECTIVE STATEMENT
Patient c/o b/l ankle and feet swelling since yesterday, SOB and cough since this morning. Patient denies chest pain, denies fever, denies dizziness, N/V, any urinary symptoms. Patient has hx of CVA, wheelchair bound, CHF that has resolved according to patient's daughter.

## 2020-02-26 NOTE — ED PROVIDER NOTE - CLINICAL SUMMARY MEDICAL DECISION MAKING FREE TEXT BOX
pt with h/o COPD, baseline O2 sat 92-93%, in ER with c./o SOB.  No CP.  also c/o LE swelling R>L.   no resp distress on exam, room air O2 sat 90-91%, decreased BS b/l.  LE duplex neg.  trop neg, bnp 960, CT chest neg for PE.  pt given nebs/steroids.  to admit for copd exacerbation.

## 2020-02-26 NOTE — ED ADULT TRIAGE NOTE - CHIEF COMPLAINT QUOTE
"Im having swelling in both feet, junky cough and shortness of breath since yesterday" as per daughter she has mild copd and her 02 is normal

## 2020-02-26 NOTE — ED ADULT NURSE NOTE - EXTENSIONS OF SELF_ADULT
04/18/18        [facility] AT [facility] [address]  [address] [facility]      Dear Guerda Morrow records indicate that you have outstanding lab work and or testing that was ordered for you and has not yet been completed:          HGB A1C [496] [Q]      CBC [6
None

## 2020-02-27 DIAGNOSIS — R09.89 OTHER SPECIFIED SYMPTOMS AND SIGNS INVOLVING THE CIRCULATORY AND RESPIRATORY SYSTEMS: ICD-10-CM

## 2020-02-27 LAB
ALBUMIN SERPL ELPH-MCNC: 4 G/DL — SIGNIFICANT CHANGE UP (ref 3.5–5.2)
ALP SERPL-CCNC: 61 U/L — SIGNIFICANT CHANGE UP (ref 30–115)
ALT FLD-CCNC: 9 U/L — SIGNIFICANT CHANGE UP (ref 0–41)
ANION GAP SERPL CALC-SCNC: 13 MMOL/L — SIGNIFICANT CHANGE UP (ref 7–14)
AST SERPL-CCNC: 11 U/L — SIGNIFICANT CHANGE UP (ref 0–41)
BASOPHILS # BLD AUTO: 0 K/UL — SIGNIFICANT CHANGE UP (ref 0–0.2)
BASOPHILS NFR BLD AUTO: 0 % — SIGNIFICANT CHANGE UP (ref 0–1)
BILIRUB SERPL-MCNC: 0.3 MG/DL — SIGNIFICANT CHANGE UP (ref 0.2–1.2)
BUN SERPL-MCNC: 17 MG/DL — SIGNIFICANT CHANGE UP (ref 10–20)
CALCIUM SERPL-MCNC: 9.5 MG/DL — SIGNIFICANT CHANGE UP (ref 8.5–10.1)
CHLORIDE SERPL-SCNC: 106 MMOL/L — SIGNIFICANT CHANGE UP (ref 98–110)
CO2 SERPL-SCNC: 26 MMOL/L — SIGNIFICANT CHANGE UP (ref 17–32)
CREAT SERPL-MCNC: 0.8 MG/DL — SIGNIFICANT CHANGE UP (ref 0.7–1.5)
EOSINOPHIL # BLD AUTO: 0 K/UL — SIGNIFICANT CHANGE UP (ref 0–0.7)
EOSINOPHIL NFR BLD AUTO: 0 % — SIGNIFICANT CHANGE UP (ref 0–8)
GIANT PLATELETS BLD QL SMEAR: PRESENT — SIGNIFICANT CHANGE UP
GLUCOSE SERPL-MCNC: 163 MG/DL — HIGH (ref 70–99)
HCT VFR BLD CALC: 33.6 % — LOW (ref 37–47)
HGB BLD-MCNC: 11 G/DL — LOW (ref 12–16)
LYMPHOCYTES # BLD AUTO: 0.38 K/UL — LOW (ref 1.2–3.4)
LYMPHOCYTES # BLD AUTO: 7.1 % — LOW (ref 20.5–51.1)
MAGNESIUM SERPL-MCNC: 2 MG/DL — SIGNIFICANT CHANGE UP (ref 1.8–2.4)
MANUAL SMEAR VERIFICATION: SIGNIFICANT CHANGE UP
MCHC RBC-ENTMCNC: 27.8 PG — SIGNIFICANT CHANGE UP (ref 27–31)
MCHC RBC-ENTMCNC: 32.7 G/DL — SIGNIFICANT CHANGE UP (ref 32–37)
MCV RBC AUTO: 84.8 FL — SIGNIFICANT CHANGE UP (ref 81–99)
MONOCYTES # BLD AUTO: 0.05 K/UL — LOW (ref 0.1–0.6)
MONOCYTES NFR BLD AUTO: 0.9 % — LOW (ref 1.7–9.3)
NEUTROPHILS # BLD AUTO: 4.83 K/UL — SIGNIFICANT CHANGE UP (ref 1.4–6.5)
NEUTROPHILS NFR BLD AUTO: 90.2 % — HIGH (ref 42.2–75.2)
PLAT MORPH BLD: NORMAL — SIGNIFICANT CHANGE UP
PLATELET # BLD AUTO: 271 K/UL — SIGNIFICANT CHANGE UP (ref 130–400)
POTASSIUM SERPL-MCNC: 5.3 MMOL/L — HIGH (ref 3.5–5)
POTASSIUM SERPL-SCNC: 5.3 MMOL/L — HIGH (ref 3.5–5)
PROT SERPL-MCNC: 6.3 G/DL — SIGNIFICANT CHANGE UP (ref 6–8)
RBC # BLD: 3.96 M/UL — LOW (ref 4.2–5.4)
RBC # FLD: 14 % — SIGNIFICANT CHANGE UP (ref 11.5–14.5)
RBC BLD AUTO: NORMAL — SIGNIFICANT CHANGE UP
SODIUM SERPL-SCNC: 145 MMOL/L — SIGNIFICANT CHANGE UP (ref 135–146)
TROPONIN T SERPL-MCNC: <0.01 NG/ML — SIGNIFICANT CHANGE UP
VARIANT LYMPHS # BLD: 1.8 % — SIGNIFICANT CHANGE UP (ref 0–5)
WBC # BLD: 5.36 K/UL — SIGNIFICANT CHANGE UP (ref 4.8–10.8)
WBC # FLD AUTO: 5.36 K/UL — SIGNIFICANT CHANGE UP (ref 4.8–10.8)

## 2020-02-27 PROCEDURE — 99222 1ST HOSP IP/OBS MODERATE 55: CPT

## 2020-02-27 RX ORDER — NIFEDIPINE 30 MG
90 TABLET, EXTENDED RELEASE 24 HR ORAL DAILY
Refills: 0 | Status: DISCONTINUED | OUTPATIENT
Start: 2020-02-27 | End: 2020-02-28

## 2020-02-27 RX ORDER — ASPIRIN/CALCIUM CARB/MAGNESIUM 324 MG
81 TABLET ORAL DAILY
Refills: 0 | Status: DISCONTINUED | OUTPATIENT
Start: 2020-02-27 | End: 2020-02-28

## 2020-02-27 RX ORDER — ENOXAPARIN SODIUM 100 MG/ML
40 INJECTION SUBCUTANEOUS DAILY
Refills: 0 | Status: DISCONTINUED | OUTPATIENT
Start: 2020-02-27 | End: 2020-02-28

## 2020-02-27 RX ORDER — BUDESONIDE AND FORMOTEROL FUMARATE DIHYDRATE 160; 4.5 UG/1; UG/1
2 AEROSOL RESPIRATORY (INHALATION)
Refills: 0 | Status: DISCONTINUED | OUTPATIENT
Start: 2020-02-27 | End: 2020-02-28

## 2020-02-27 RX ORDER — SERTRALINE 25 MG/1
100 TABLET, FILM COATED ORAL DAILY
Refills: 0 | Status: DISCONTINUED | OUTPATIENT
Start: 2020-02-27 | End: 2020-02-28

## 2020-02-27 RX ORDER — ATORVASTATIN CALCIUM 80 MG/1
40 TABLET, FILM COATED ORAL AT BEDTIME
Refills: 0 | Status: DISCONTINUED | OUTPATIENT
Start: 2020-02-27 | End: 2020-02-28

## 2020-02-27 RX ORDER — LABETALOL HCL 100 MG
200 TABLET ORAL
Refills: 0 | Status: DISCONTINUED | OUTPATIENT
Start: 2020-02-27 | End: 2020-02-28

## 2020-02-27 RX ORDER — FAMOTIDINE 10 MG/ML
20 INJECTION INTRAVENOUS DAILY
Refills: 0 | Status: DISCONTINUED | OUTPATIENT
Start: 2020-02-27 | End: 2020-02-28

## 2020-02-27 RX ADMIN — Medication 200 MILLIGRAM(S): at 05:37

## 2020-02-27 RX ADMIN — Medication 200 MILLIGRAM(S): at 17:15

## 2020-02-27 RX ADMIN — Medication 90 MILLIGRAM(S): at 05:37

## 2020-02-27 RX ADMIN — Medication 60 MILLIGRAM(S): at 05:37

## 2020-02-27 RX ADMIN — Medication 3 MILLILITER(S): at 22:00

## 2020-02-27 RX ADMIN — ATORVASTATIN CALCIUM 40 MILLIGRAM(S): 80 TABLET, FILM COATED ORAL at 22:00

## 2020-02-27 RX ADMIN — SERTRALINE 100 MILLIGRAM(S): 25 TABLET, FILM COATED ORAL at 11:16

## 2020-02-27 RX ADMIN — Medication 3 MILLILITER(S): at 13:03

## 2020-02-27 RX ADMIN — FAMOTIDINE 20 MILLIGRAM(S): 10 INJECTION INTRAVENOUS at 11:16

## 2020-02-27 RX ADMIN — Medication 81 MILLIGRAM(S): at 11:16

## 2020-02-27 NOTE — H&P ADULT - NSHPLABSRESULTS_GEN_ALL_CORE
10.8   7.17  )-----------( 283      ( 26 Feb 2020 14:00 )             34.2       02-26    142  |  104  |  13  ----------------------------<  96  5.2<H>   |  24  |  0.8    Ca    9.0      26 Feb 2020 14:00  Mg     1.9     02-26    TPro  6.3  /  Alb  4.0  /  TBili  0.2  /  DBili  x   /  AST  13  /  ALT  9   /  AlkPhos  60  02-26            PT/INR - ( 26 Feb 2020 14:00 )   PT: 13.10 sec;   INR: 1.14 ratio         PTT - ( 26 Feb 2020 14:00 )  PTT:28.3 sec    CARDIAC MARKERS ( 26 Feb 2020 14:00 )  x     / <0.01 ng/mL / x     / x     / x      < from: 12 Lead ECG (02.17.20 @ 09:31) >      Diagnosis Line Normal sinus rhythm  Normal ECG    < end of copied text >      < from: CT Angio Chest w/ IV Cont (02.26.20 @ 16:42) >        IMPRESSION:    No evidence of pulmonary embolism.    Severe centrilobular emphysema.    Bibasilar small effusions with atelectasis at both lung bases.    < end of copied text >      < from: Xray Chest 1 View- PORTABLE-Routine (02.26.20 @ 14:26) >      Impression:      Unchanged small left pleural effusion/basilar opacity.    Near resolution of right basilar opacity/effusion.    Emphysema.    < end of copied text >    < from: VA Duplex Lower Ext Vein Scan, Bilat (02.26.20 @ 14:36) >      Impression:    No evidence of deep venous thrombosis or superficial thrombophlebitis in the bilateral lower extremities.    < end of copied text >

## 2020-02-27 NOTE — H&P ADULT - HISTORY OF PRESENT ILLNESS
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 Patient reports that the SOB is worsened by exertion and relieved by rest and is not accompanied by paroxysmal nocturnal dyspnea, orthopnea or chest pain. Patient also reports a non-productive cough for the past 2 days as well as bilateral leg swelling R>L for the past 3 days which she has never had before. Daughter at bedside states the pt was admitted to the hospital recently for norovirus and acute kidney injury. Denies any fevers, chills, chest pain, abdominal pain, recent n/v/d, back pain, syncope, lightheadedness or weight changes.     In the ED patient is hemodynamically stable. Given LE swelling and shortness of breath pt had PE work up which was negative. Now at baseline respiratory status after duo-neb and solumedrol. 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 Patient reports that the SOB was episodic and worsened by exertion and relieved by rest and is not accompanied by paroxysmal nocturnal dyspnea, orthopnea or chest pain. Patient also reports a non-productive cough for the past 2 days as well as bilateral leg swelling R>L for the past 3 days which she has never had before. Daughter at bedside states the pt was admitted to the hospital recently for norovirus and acute kidney injury. Denies any fevers, chills, chest pain, abdominal pain, recent n/v/d, back pain, syncope, lightheadedness or weight changes.     In the ED patient is hemodynamically stable. Given LE swelling and shortness of breath pt had PE work up which was negative. Now at baseline respiratory status after duo-neb and solumedrol.

## 2020-02-27 NOTE — H&P ADULT - NSHPREVIEWOFSYSTEMS_GEN_ALL_CORE
REVIEW OF SYSTEMS:    CONSTITUTIONAL: No weakness, fevers or chills  EYES/ENT: No visual changes;  No vertigo or throat pain   NECK: No pain or stiffness  RESPIRATORY: No cough, wheezing, hemoptysis; + shortness of breath, + non-prod cough,  CARDIOVASCULAR: No chest pain or palpitations  GASTROINTESTINAL: No abdominal or epigastric pain. No nausea, vomiting, or hematemesis; No diarrhea or constipation. No melena or hematochezia.  GENITOURINARY: No dysuria, frequency or hematuria  NEUROLOGICAL: No numbness or weakness  SKIN: No itching, rashes

## 2020-02-27 NOTE — H&P ADULT - NSHPPHYSICALEXAM_GEN_ALL_CORE
PHYSICAL EXAM:  GENERAL: NAD, lying in bed comfortably, on NC cannula but pt comfortable without it  HEAD:  Atraumatic, Normocephalic  EYES: EOMI, PERRLA, conjunctiva and sclera clear  ENT: Moist mucous membranes  NECK: Supple, No JVD  CHEST/LUNG: diminished breath sounds at the bases, in no resp distress, no wheezing appreciated  HEART: Regular rate and rhythm  ABDOMEN: Bowel sounds present; Soft, Nontender, Nondistended.   EXTREMITIES:  2+ Peripheral Pulses, brisk capillary refill. Mild 1+ edema, R foot . No appreciable edema to left foot  NERVOUS SYSTEM:  Alert & Oriented X3, speech clear. Some RLE weakness is noted, strength 5/5 in the UE, no pronator drift, no dysmetria

## 2020-02-27 NOTE — H&P ADULT - ASSESSMENT
84 year old female with PMHx of HTN,HLD, hemmorhagic CVA in 2018 with residual RLE weaknesses COPD not on home O2 ( baseline sat 92-93%) presents with shortness of breath for 2 days. Also complains of non-prod cough and some new foot swelling R>L.    In the ED patient is hemodynamically stable. Given LE swelling and shortness of breath pt had PE work up which was negative. EKG nl. Now at baseline respiratory status after solumedrol.    Shortness of breath ( resolved) - COPD vs CHF exacerbation  - CXR: Emphysema with small L pleural effusion, EKG NSR, LE Duplex negative  - Chest CT angio: Small bibasilar effusion and severe centrilobular emphysema  - , Trop negative, VBG shows no CO2 retention  - S/P Solumedrol in ED with improvement  - duo/nebs and solumedrol started  - will order 2d ECHO    Hyperkalemia  - K 5.2  - f/u cmp in am    Hx of Hemorrhagic Stroke  - RLE weakness which baseline per daughter     HTN  - C/w home meds  - Consider replacing Nifedipine given complaints of LE edema ( minimal on exam)    HLD  - C/w statin    DVT ppx: Lovenox  AAT: Ambulate as tolerated  Dispo: Acute ( from home) 84 year old female with PMHx of HTN,HLD, hemmorhagic CVA in 2018 with residual RLE weaknesses COPD not on home O2 ( baseline sat 92-93%) presents with shortness of breath for 2 days. Also complains of non-prod cough and some new foot swelling R>L.    In the ED patient is hemodynamically stable. Given LE swelling and shortness of breath pt had PE work up which was negative. EKG nl. Now at baseline respiratory status after solumedrol.    Shortness of breath ( resolved) - COPD vs CHF exacerbation  - doubt aspiration  - CXR: Emphysema with small L pleural effusion, EKG NSR, LE Duplex negative  - Chest CT angio: Small bibasilar effusion and severe centrilobular emphysema  - , Trop negative, VBG shows no CO2 retention  - S/P Solumedrol in ED with improvement  - duo/nebs and solumedrol started  - will order 2d ECHO, consider diuretic if suspicion for HF is high ( no Lasix given SULFA allergy)    Hyperkalemia  - K 5.2  - f/u cmp in am    Hx of Hemorrhagic Stroke  - RLE weakness which baseline per daughter     HTN  - C/w home meds  - Consider replacing Nifedipine given complaints of LE edema ( minimal on exam)    HLD  - C/w statin    DVT ppx: Lovenox  AAT: Ambulate as tolerated  Dispo: Acute ( from home)

## 2020-02-27 NOTE — H&P ADULT - NSHPPOASURGSITEINCISION_GEN_ALL_CORE
GENERAL: +fevers, +chills.  EYES: No blurry vision,  No photophobia  ENT: No sore throat.  No dysphagia  Cardiovascular: No chest pain, palpitations, orthopnea  Pulmonary:  + productive cough, no wheezing. + shortness of breath  Gastrointestinal: No abdominal pain, no diarrhea, no constipation.    : No dysuria.  No hematuria  Musculoskeletal: No weakness.  No myalgias.  Dermatology:  two small skin tears on shins  Neuro: No Headache.  No vertigo.  No dizziness.  Psych: No anxiety, no depression.  Denies suicidal thoughts.
no

## 2020-02-27 NOTE — H&P ADULT - ATTENDING COMMENTS
Patient seen and examined independently. Agree with resident note/ history / physical exam and plan of care with following exceptions/additions/updates. Case discussed with house-staff, nursing and patient and her daughter at the bedside.   pt is mostly wheelchair bound, her activity is limited to transfer to chair and recliner ( sleeps in recliner)   Vital Signs Last 24 Hrs  T(C): 36.6 (27 Feb 2020 00:25), Max: 37 (26 Feb 2020 12:45)  T(F): 97.8 (27 Feb 2020 00:25), Max: 98.6 (26 Feb 2020 12:45)  HR: 73 (27 Feb 2020 05:36) (70 - 83)  BP: 167/71 (27 Feb 2020 05:36) (121/56 - 172/72)  BP(mean): --  RR: 18 (27 Feb 2020 05:36) (18 - 18)  SpO2: 92% (27 Feb 2020 05:36) (91% - 99%)    Physical exam:   constitutional NAD, AAOX3, Respiratory  lungs bilat wheezing, CVS heart RRR, GI: abdomen Soft NT, ND, BS+, skin: intact  neuro exam non focal.                           11.0   5.36  )-----------( 271      ( 27 Feb 2020 08:00 )             33.6   02-27    145  |  106  |  17  ----------------------------<  163<H>  5.3<H>   |  26  |  0.8    Ca    9.5      27 Feb 2020 08:00  Mg     2.0     02-27    TPro  6.3  /  Alb  4.0  /  TBili  0.3  /  DBili  x   /  AST  11  /  ALT  9   /  AlkPhos  61  02-27  < from: Xray Chest 1 View- PORTABLE-Routine (02.26.20 @ 14:26) >  Unchanged small left pleural effusion/basilar opacity.  Near resolution of right basilar opacity/effusion.  Emphysema  < end of copied text >  < from: CT Angio Chest w/ IV Cont (02.26.20 @ 16:42) >  No evidence of pulmonary embolism  Severe centrilobular emphysema.  Bibasilar small effusions with atelectasis at both lung bases.  < end of copied text >  a/p  #copd exac, emphysema,  rule out chf, follow up echo report. cont nebs, o2 as needed. pulm eval ,   #HTN cont meds, monitor  #Hx of CVA cont meds  #hyperlipidemia cont meds    full code.

## 2020-02-27 NOTE — H&P ADULT - NSHPSOCIALHISTORY_GEN_ALL_CORE
Pt with greater than 50 pack year history. States she was able to quit 2 years ago. Denies EtOH abuse, denies any recreational drug use. Pt lives with daughter and requires assistance with any kind of ambulation at baseline

## 2020-02-28 ENCOUNTER — TRANSCRIPTION ENCOUNTER (OUTPATIENT)
Age: 85
End: 2020-02-28

## 2020-02-28 VITALS
SYSTOLIC BLOOD PRESSURE: 104 MMHG | TEMPERATURE: 99 F | DIASTOLIC BLOOD PRESSURE: 80 MMHG | HEART RATE: 77 BPM | OXYGEN SATURATION: 97 % | RESPIRATION RATE: 18 BRPM

## 2020-02-28 LAB
ALBUMIN SERPL ELPH-MCNC: 3.8 G/DL — SIGNIFICANT CHANGE UP (ref 3.5–5.2)
ALP SERPL-CCNC: 53 U/L — SIGNIFICANT CHANGE UP (ref 30–115)
ALT FLD-CCNC: 8 U/L — SIGNIFICANT CHANGE UP (ref 0–41)
ANION GAP SERPL CALC-SCNC: 12 MMOL/L — SIGNIFICANT CHANGE UP (ref 7–14)
AST SERPL-CCNC: 9 U/L — SIGNIFICANT CHANGE UP (ref 0–41)
BASOPHILS # BLD AUTO: 0.02 K/UL — SIGNIFICANT CHANGE UP (ref 0–0.2)
BASOPHILS NFR BLD AUTO: 0.2 % — SIGNIFICANT CHANGE UP (ref 0–1)
BILIRUB SERPL-MCNC: 0.3 MG/DL — SIGNIFICANT CHANGE UP (ref 0.2–1.2)
BUN SERPL-MCNC: 21 MG/DL — HIGH (ref 10–20)
CALCIUM SERPL-MCNC: 8.9 MG/DL — SIGNIFICANT CHANGE UP (ref 8.5–10.1)
CHLORIDE SERPL-SCNC: 108 MMOL/L — SIGNIFICANT CHANGE UP (ref 98–110)
CO2 SERPL-SCNC: 26 MMOL/L — SIGNIFICANT CHANGE UP (ref 17–32)
CREAT SERPL-MCNC: 0.8 MG/DL — SIGNIFICANT CHANGE UP (ref 0.7–1.5)
EOSINOPHIL # BLD AUTO: 2.49 K/UL — HIGH (ref 0–0.7)
EOSINOPHIL NFR BLD AUTO: 23.4 % — HIGH (ref 0–8)
GLUCOSE SERPL-MCNC: 94 MG/DL — SIGNIFICANT CHANGE UP (ref 70–99)
HCT VFR BLD CALC: 32.4 % — LOW (ref 37–47)
HGB BLD-MCNC: 10.1 G/DL — LOW (ref 12–16)
IMM GRANULOCYTES NFR BLD AUTO: 0.6 % — HIGH (ref 0.1–0.3)
LYMPHOCYTES # BLD AUTO: 0.69 K/UL — LOW (ref 1.2–3.4)
LYMPHOCYTES # BLD AUTO: 6.5 % — LOW (ref 20.5–51.1)
MAGNESIUM SERPL-MCNC: 2 MG/DL — SIGNIFICANT CHANGE UP (ref 1.8–2.4)
MCHC RBC-ENTMCNC: 26.4 PG — LOW (ref 27–31)
MCHC RBC-ENTMCNC: 31.2 G/DL — LOW (ref 32–37)
MCV RBC AUTO: 84.6 FL — SIGNIFICANT CHANGE UP (ref 81–99)
MONOCYTES # BLD AUTO: 0.53 K/UL — SIGNIFICANT CHANGE UP (ref 0.1–0.6)
MONOCYTES NFR BLD AUTO: 5 % — SIGNIFICANT CHANGE UP (ref 1.7–9.3)
NEUTROPHILS # BLD AUTO: 6.86 K/UL — HIGH (ref 1.4–6.5)
NEUTROPHILS NFR BLD AUTO: 64.3 % — SIGNIFICANT CHANGE UP (ref 42.2–75.2)
NRBC # BLD: 0 /100 WBCS — SIGNIFICANT CHANGE UP (ref 0–0)
PLATELET # BLD AUTO: 290 K/UL — SIGNIFICANT CHANGE UP (ref 130–400)
POTASSIUM SERPL-MCNC: 4.2 MMOL/L — SIGNIFICANT CHANGE UP (ref 3.5–5)
POTASSIUM SERPL-SCNC: 4.2 MMOL/L — SIGNIFICANT CHANGE UP (ref 3.5–5)
PROT SERPL-MCNC: 5.9 G/DL — LOW (ref 6–8)
RBC # BLD: 3.83 M/UL — LOW (ref 4.2–5.4)
RBC # FLD: 14.2 % — SIGNIFICANT CHANGE UP (ref 11.5–14.5)
SODIUM SERPL-SCNC: 146 MMOL/L — SIGNIFICANT CHANGE UP (ref 135–146)
WBC # BLD: 10.65 K/UL — SIGNIFICANT CHANGE UP (ref 4.8–10.8)
WBC # FLD AUTO: 10.65 K/UL — SIGNIFICANT CHANGE UP (ref 4.8–10.8)

## 2020-02-28 PROCEDURE — 99239 HOSP IP/OBS DSCHRG MGMT >30: CPT

## 2020-02-28 RX ORDER — BUDESONIDE AND FORMOTEROL FUMARATE DIHYDRATE 160; 4.5 UG/1; UG/1
2 AEROSOL RESPIRATORY (INHALATION)
Qty: 6 | Refills: 0
Start: 2020-02-28 | End: 2020-03-28

## 2020-02-28 RX ORDER — ALBUTEROL 90 UG/1
2 AEROSOL, METERED ORAL
Qty: 3.7 | Refills: 0
Start: 2020-02-28 | End: 2020-03-28

## 2020-02-28 RX ORDER — SPIRONOLACTONE 25 MG/1
25 TABLET, FILM COATED ORAL DAILY
Refills: 0 | Status: DISCONTINUED | OUTPATIENT
Start: 2020-02-28 | End: 2020-02-28

## 2020-02-28 RX ORDER — SPIRONOLACTONE 25 MG/1
1 TABLET, FILM COATED ORAL
Qty: 14 | Refills: 0
Start: 2020-02-28 | End: 2020-03-12

## 2020-02-28 RX ADMIN — Medication 200 MILLIGRAM(S): at 05:29

## 2020-02-28 RX ADMIN — BUDESONIDE AND FORMOTEROL FUMARATE DIHYDRATE 2 PUFF(S): 160; 4.5 AEROSOL RESPIRATORY (INHALATION) at 08:32

## 2020-02-28 RX ADMIN — SPIRONOLACTONE 25 MILLIGRAM(S): 25 TABLET, FILM COATED ORAL at 12:35

## 2020-02-28 RX ADMIN — Medication 3 MILLILITER(S): at 08:30

## 2020-02-28 RX ADMIN — SERTRALINE 100 MILLIGRAM(S): 25 TABLET, FILM COATED ORAL at 12:10

## 2020-02-28 RX ADMIN — Medication 90 MILLIGRAM(S): at 05:25

## 2020-02-28 RX ADMIN — Medication 2.5 MILLIGRAM(S): at 05:29

## 2020-02-28 RX ADMIN — FAMOTIDINE 20 MILLIGRAM(S): 10 INJECTION INTRAVENOUS at 12:10

## 2020-02-28 RX ADMIN — Medication 3 MILLILITER(S): at 14:58

## 2020-02-28 RX ADMIN — Medication 81 MILLIGRAM(S): at 12:10

## 2020-02-28 RX ADMIN — Medication 40 MILLIGRAM(S): at 05:29

## 2020-02-28 NOTE — DISCHARGE NOTE PROVIDER - NSDCMRMEDTOKEN_GEN_ALL_CORE_FT
aspirin 81 mg oral tablet, chewable: 1 tab(s) orally once a day  atorvastatin 40 mg oral tablet: 1 tab(s) orally once a day (at bedtime)  enalapril 2.5 mg oral tablet: 1 tab(s) orally once a day at noon  labetalol 200 mg oral tablet: 1 tab(s) orally 2 times a day  NIFEdipine 90 mg oral tablet, extended release: 1 tab(s) orally once a day  raNITIdine 150 mg oral capsule: orally once a day  sertraline 100 mg oral tablet: 1 tab(s) orally once a day albuterol 90 mcg/inh inhalation aerosol: 2 puff(s) inhaled every 4 hours, As Needed -for shortness of breath and/or wheezing   aspirin 81 mg oral tablet, chewable: 1 tab(s) orally once a day  atorvastatin 40 mg oral tablet: 1 tab(s) orally once a day (at bedtime)  enalapril 2.5 mg oral tablet: 1 tab(s) orally once a day at noon  labetalol 200 mg oral tablet: 1 tab(s) orally 2 times a day  NIFEdipine 90 mg oral tablet, extended release: 1 tab(s) orally once a day  predniSONE 20 mg oral tablet: 2 tab(s) orally once a day x 3 days  1 tab(s) orally once a day x 3 days  raNITIdine 150 mg oral capsule: orally once a day  sertraline 100 mg oral tablet: 1 tab(s) orally once a day  spironolactone 25 mg oral tablet: 1 tab(s) orally once a day  Symbicort 160 mcg-4.5 mcg/inh inhalation aerosol: 2 puff(s) inhaled 2 times a day

## 2020-02-28 NOTE — DISCHARGE NOTE PROVIDER - CARE PROVIDER_API CALL
Nahum Silva)  Pediatrics Medicine  [address] [place] [address]  Phone: [phone]  Fax: [phone]  Established Patient  Follow Up Time: 1 week    Valdo Whitt)  Medicine  [address] [place] [id number]  Phone: [phone]  Fax: [phone]  Established Patient  Follow Up Time: 1-3 days

## 2020-02-28 NOTE — DISCHARGE NOTE PROVIDER - NSDCCPCAREPLAN_GEN_ALL_CORE_FT
PRINCIPAL DISCHARGE DIAGNOSIS  Diagnosis: COPD exacerbation  Assessment and Plan of Treatment: You were diagnosed with COPD exacerbation with some components of diastolic heart failure. Your CT scan shows that you have severe emphysema and mild effusion in the lungs. Currently your respiratory status has improved. You still has mild edema so you will be discharged with Aldactone and inhalers. Please follow up with Dr. Whitt and your PCP as soon as possible to further optimize your medication.

## 2020-02-28 NOTE — PROGRESS NOTE ADULT - ASSESSMENT
EMILIANO ANAYA 84y Female  MRN#: [id number]   CODE STATUS: Full code      SUBJECTIVE  Patient is a 84y old Female who presents with a chief complaint of shortness of breath  Currently admitted to medicine with the primary diagnosis of mild acute COPD exacerbation  Today is hospital day 3d, and this morning she is resting in bed and reports no overnight events. Patient does not feel short of breath anymore and is satting at 94% on room air. Patient's lower extremities swelling has improved.       OBJECTIVE  PAST MEDICAL & SURGICAL HISTORY  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  HLD (hyperlipidemia)  No significant past surgical history    ALLERGIES:  Celebrex (Other (Moderate))  sulfonamides (Unknown)      HOME MEDICATIONS:  HOME MEDICATIONS:  atorvastatin 40 mg oral tablet: 1 tab(s) orally once a day (at bedtime) (09 Aug 2019 22:46)  enalapril 2.5 mg oral tablet: 1 tab(s) orally once a day at noon (09 Aug 2019 22:46)  labetalol 200 mg oral tablet: 1 tab(s) orally 2 times a day (09 Aug 2019 22:46)  NIFEdipine 90 mg oral tablet, extended release: 1 tab(s) orally once a day (09 Aug 2019 22:46)  raNITIdine 150 mg oral capsule: orally once a day (09 Aug 2019 22:46)  sertraline 100 mg oral tablet: 1 tab(s) orally once a day (09 Aug 2019 22:46)      MEDICATIONS:  STANDING MEDICATIONS  albuterol/ipratropium for Nebulization 3 milliLiter(s) Nebulizer every 6 hours  aspirin  chewable 81 milliGRAM(s) Oral daily  atorvastatin 40 milliGRAM(s) Oral at bedtime  budesonide 160 MICROgram(s)/formoterol 4.5 MICROgram(s) Inhaler 2 Puff(s) Inhalation two times a day  enalapril 2.5 milliGRAM(s) Oral daily  enoxaparin Injectable 40 milliGRAM(s) SubCutaneous daily  famotidine    Tablet 20 milliGRAM(s) Oral daily  labetalol 200 milliGRAM(s) Oral two times a day  NIFEdipine XL 90 milliGRAM(s) Oral daily  predniSONE   Tablet 40 milliGRAM(s) Oral daily  sertraline 100 milliGRAM(s) Oral daily    PRN MEDICATIONS      VITAL SIGNS: Last 24 Hours  T(C): 37.2 (28 Feb 2020 08:13), Max: 37.2 (28 Feb 2020 08:13)  T(F): 99 (28 Feb 2020 08:13), Max: 99 (28 Feb 2020 08:13)  HR: 75 (28 Feb 2020 08:13) (74 - 77)  BP: 153/67 (28 Feb 2020 08:13) (135/60 - 183/75)  BP(mean): --  RR: 18 (28 Feb 2020 08:13) (16 - 48)  SpO2: 94% (28 Feb 2020 08:13) (90% - 98%)    LABS:                        10.1   x     )-----------( 290      ( 28 Feb 2020 07:17 )             32.4     02-27    145  |  106  |  17  ----------------------------<  163<H>  5.3<H>   |  26  |  0.8    Ca    9.5      27 Feb 2020 08:00  Mg     2.0     02-27    TPro  6.3  /  Alb  4.0  /  TBili  0.3  /  DBili  x   /  AST  11  /  ALT  9   /  AlkPhos  61  02-27    PT/INR - ( 26 Feb 2020 14:00 )   PT: 13.10 sec;   INR: 1.14 ratio         PTT - ( 26 Feb 2020 14:00 )  PTT:28.3 sec      CARDIAC MARKERS ( 27 Feb 2020 08:00 )  x     / <0.01 ng/mL / x     / x     / x      CARDIAC MARKERS ( 26 Feb 2020 14:00 )  x     / <0.01 ng/mL / x     / x     / x        RADIOLOGY:        PHYSICAL EXAM:    GENERAL: NAD, well-developed, AAOx3  HEENT:  Atraumatic, Normocephalic. EOMI, PERRLA, conjunctiva and sclera clear, No JVD  PULMONARY: Clear to auscultation bilaterally; No wheeze  CARDIOVASCULAR: Regular rate and rhythm; No murmurs, rubs, or gallops  GASTROINTESTINAL: Soft, Nontender, Nondistended; Bowel sounds present  MUSCULOSKELETAL:  2+ Peripheral Pulses, No clubbing, cyanosis, or edema  NEUROLOGY: non-focal  SKIN: No rashes or lesions      ADMISSION SUMMARY  Patient is a 84y old Female who presents with a chief complaint of Currently admitted to medicine with the primary diagnosis of Dyspnea on exertion  Hospital course has been complicated by _______.       ASSESSMENT & PLAN    1. DYSPNEA ON EXERTION COPD EXACERBATION HYPOXIA      2.    3. 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  HLD (hyperlipidemia)        Present today:  ( ) Congestive Heart Failure, Yes? ( )Acute / ( )Acute on Chronic / ( )Chronic  :  ( )Systolic / ( )Diastolic               Plan:  ( ) Complicated Pneumonia, Type?  ( )Parapneumonic effusion / ( )Abscess / ( ) Multilobar / ( )Other               Plan:  ( ) Morbid Obesity, Yes? BMI:               Plan:  ( ) Functional Quadriplegia               Plan:  ( ) Encephalopathy               Plan:    DVT ppx:  GI ppx:  Diet:  Activity:  Lines:  Code status:  Dispo: EMILIANO ANAYA 84y Female  MRN#: [id number]   CODE STATUS: Full code      SUBJECTIVE  Patient is a 84y old Female who presents with a chief complaint of shortness of breath  Currently admitted to medicine with the primary diagnosis of mild acute COPD exacerbation  Today is hospital day 3d, and this morning she is resting in bed and reports no overnight events. Patient does not feel short of breath anymore and is satting at 94% on room air. Patient's lower extremities swelling has improved.       OBJECTIVE  PAST MEDICAL & SURGICAL HISTORY  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  HLD (hyperlipidemia)  No significant past surgical history    ALLERGIES:  Celebrex (Other (Moderate))  sulfonamides (Unknown)      HOME MEDICATIONS:  HOME MEDICATIONS:  atorvastatin 40 mg oral tablet: 1 tab(s) orally once a day (at bedtime) (09 Aug 2019 22:46)  enalapril 2.5 mg oral tablet: 1 tab(s) orally once a day at noon (09 Aug 2019 22:46)  labetalol 200 mg oral tablet: 1 tab(s) orally 2 times a day (09 Aug 2019 22:46)  NIFEdipine 90 mg oral tablet, extended release: 1 tab(s) orally once a day (09 Aug 2019 22:46)  raNITIdine 150 mg oral capsule: orally once a day (09 Aug 2019 22:46)  sertraline 100 mg oral tablet: 1 tab(s) orally once a day (09 Aug 2019 22:46)      MEDICATIONS:  STANDING MEDICATIONS  albuterol/ipratropium for Nebulization 3 milliLiter(s) Nebulizer every 6 hours  aspirin  chewable 81 milliGRAM(s) Oral daily  atorvastatin 40 milliGRAM(s) Oral at bedtime  budesonide 160 MICROgram(s)/formoterol 4.5 MICROgram(s) Inhaler 2 Puff(s) Inhalation two times a day  enalapril 2.5 milliGRAM(s) Oral daily  enoxaparin Injectable 40 milliGRAM(s) SubCutaneous daily  famotidine    Tablet 20 milliGRAM(s) Oral daily  labetalol 200 milliGRAM(s) Oral two times a day  NIFEdipine XL 90 milliGRAM(s) Oral daily  predniSONE   Tablet 40 milliGRAM(s) Oral daily  sertraline 100 milliGRAM(s) Oral daily    PRN MEDICATIONS      VITAL SIGNS: Last 24 Hours  T(C): 37.2 (28 Feb 2020 08:13), Max: 37.2 (28 Feb 2020 08:13)  T(F): 99 (28 Feb 2020 08:13), Max: 99 (28 Feb 2020 08:13)  HR: 75 (28 Feb 2020 08:13) (74 - 77)  BP: 153/67 (28 Feb 2020 08:13) (135/60 - 183/75)  BP(mean): --  RR: 18 (28 Feb 2020 08:13) (16 - 48)  SpO2: 94% (28 Feb 2020 08:13) (90% - 98%)    LABS:                        10.1   x     )-----------( 290      ( 28 Feb 2020 07:17 )             32.4     02-27    145  |  106  |  17  ----------------------------<  163<H>  5.3<H>   |  26  |  0.8    Ca    9.5      27 Feb 2020 08:00  Mg     2.0     02-27    TPro  6.3  /  Alb  4.0  /  TBili  0.3  /  DBili  x   /  AST  11  /  ALT  9   /  AlkPhos  61  02-27    PT/INR - ( 26 Feb 2020 14:00 )   PT: 13.10 sec;   INR: 1.14 ratio         PTT - ( 26 Feb 2020 14:00 )  PTT:28.3 sec      CARDIAC MARKERS ( 27 Feb 2020 08:00 )  x     / <0.01 ng/mL / x     / x     / x      CARDIAC MARKERS ( 26 Feb 2020 14:00 )  x     / <0.01 ng/mL / x     / x     / x        RADIOLOGY:    < from: CT Angio Chest w/ IV Cont (02.26.20 @ 16:42) >  No evidence of pulmonary embolism.    Severe centrilobular emphysema.    Bibasilar small effusions with atelectasis at both lung bases.    < end of copied text >    < from: [facility] Duplex Lower Ext Vein Scan, Bilat (02.26.20 @ 14:36) >  No evidence of deep venous thrombosis or superficial thrombophlebitis in the bilateral lower extremities.    < end of copied text >    < from: Xray Chest 1 View- PORTABLE-Routine (02.26.20 @ 14:26) >  Unchanged small left pleural effusion/basilar opacity.    Near resolution of right basilar opacity/effusion.    Emphysema.    < end of copied text >    < from: Transthoracic Echocardiogram (02.27.20 @ 08:37) >   1. LV Ejection Fraction by Veliz's Method with a biplane EF of 56 %.   2. Mildly increased LV wall thickness.   3. Spectral Doppler shows pseudonormal pattern of left ventricular myocardial filling (Grade II diastolic dysfunction).   4. Mild mitral valve regurgitation.   5. Thickening and calcification of the anterior and posterior mitral valve leaflets.   6. Mild aortic regurgitation.    < end of copied text >      PHYSICAL EXAM:  GENERAL: NAD, sitting in bed comfortably  HEAD: Atraumatic, Normocephalic  EYES: EOMI, PERRLA, conjunctiva pink and cornea white  ENT: Normal external ears and nose, no discharges; moist mucous membranes, no erythema on posterior oropharynx  NECK: Supple, nontender to palpation; no JVD  CHEST/LUNG: Mild crackles at bilateral lung base  HEART: Regular rate and rhythm; No murmurs, rubs, or gallops  ABDOMEN: Soft, nontender, nondistended; no rebound tenderness, no guarding; no hepatomegaly; normoactive bowel sounds  EXTREMITIES:  2+ Peripheral Pulses, brisk capillary refill. No clubbing, cyanosis. 1+ petal edema on right foot  NERVOUS SYSTEM: Alert and oriented to person, time, place and situation, speech clear. No focal deficits   MSK: Residual LE weakness, 4+/5 bilaterally on UE  SKIN: No rashes or lesions    ADMISSION SUMMARY  Patient is a 84y old Female who presents with a chief complaint of shortness of breath  Currently admitted to medicine with the primary diagnosis of mild acute COPD exacerbation      ASSESSMENT & PLAN  84 year old female with PMHx of HTN,HLD, hemmorhagic CVA in 2018 with residual RLE weaknesses COPD not on home O2 ( baseline sat 92-93%) presents with shortness of breath for 2 days. Also complains of non-prod cough and some new foot swelling R>L.    In the ED patient is hemodynamically stable. Given LE swelling and shortness of breath pt had PE work up which was negative. EKG nl. Now at baseline respiratory status after solumedrol.    # Mild COPD exacerbation vs HFpEF  - CXR: Emphysema with small L pleural effusion, EKG NSR, LE Duplex negative  - Chest CT angio: Small bibasilar effusion and severe centrilobular emphysema  - , Trop negative x2, VBG shows no CO2 retention  - ECHO shows EF 54% with grade 2 diastolic dysfunction, mild MR, mild AR  - S/P Solumedrol in ED with improvement  - Continue Duonebs, Symbicort, and Prednisone 40mg q24hrs  - Pending Pulm recs    Hyperkalemia - resolved  - K 4.3    Hx of Hemorrhagic Stroke  - RLE weakness which baseline per daughter     HTN  - Stable  - C/w home meds    HLD  - C/w statin      DVT ppx: Lovenox  GI ppx: Not indicated  Diet: DASH  Activity: increase as tolerated  Lines: Peripheral IVs  Code status: full code  Dispo: pending pulm recs for discharge EMILIANO ANAYA 84y Female  MRN#: [id number]   CODE STATUS: Full code      SUBJECTIVE  Patient is a 84y old Female who presents with a chief complaint of shortness of breath  Currently admitted to medicine with the primary diagnosis of mild acute COPD exacerbation  Today is hospital day 3d, and this morning she is resting in bed and reports no overnight events. Patient does not feel short of breath anymore and is satting at 94% on room air. Patient's lower extremities swelling has improved.       OBJECTIVE  PAST MEDICAL & SURGICAL HISTORY  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  HLD (hyperlipidemia)  No significant past surgical history    ALLERGIES:  Celebrex (Other (Moderate))  sulfonamides (Unknown)      HOME MEDICATIONS:  HOME MEDICATIONS:  atorvastatin 40 mg oral tablet: 1 tab(s) orally once a day (at bedtime) (09 Aug 2019 22:46)  enalapril 2.5 mg oral tablet: 1 tab(s) orally once a day at noon (09 Aug 2019 22:46)  labetalol 200 mg oral tablet: 1 tab(s) orally 2 times a day (09 Aug 2019 22:46)  NIFEdipine 90 mg oral tablet, extended release: 1 tab(s) orally once a day (09 Aug 2019 22:46)  raNITIdine 150 mg oral capsule: orally once a day (09 Aug 2019 22:46)  sertraline 100 mg oral tablet: 1 tab(s) orally once a day (09 Aug 2019 22:46)      MEDICATIONS:  STANDING MEDICATIONS  albuterol/ipratropium for Nebulization 3 milliLiter(s) Nebulizer every 6 hours  aspirin  chewable 81 milliGRAM(s) Oral daily  atorvastatin 40 milliGRAM(s) Oral at bedtime  budesonide 160 MICROgram(s)/formoterol 4.5 MICROgram(s) Inhaler 2 Puff(s) Inhalation two times a day  enalapril 2.5 milliGRAM(s) Oral daily  enoxaparin Injectable 40 milliGRAM(s) SubCutaneous daily  famotidine    Tablet 20 milliGRAM(s) Oral daily  labetalol 200 milliGRAM(s) Oral two times a day  NIFEdipine XL 90 milliGRAM(s) Oral daily  predniSONE   Tablet 40 milliGRAM(s) Oral daily  sertraline 100 milliGRAM(s) Oral daily    PRN MEDICATIONS      VITAL SIGNS: Last 24 Hours  T(C): 37.2 (28 Feb 2020 08:13), Max: 37.2 (28 Feb 2020 08:13)  T(F): 99 (28 Feb 2020 08:13), Max: 99 (28 Feb 2020 08:13)  HR: 75 (28 Feb 2020 08:13) (74 - 77)  BP: 153/67 (28 Feb 2020 08:13) (135/60 - 183/75)  RR: 18 (28 Feb 2020 08:13) (16 - 48)  SpO2: 94% (28 Feb 2020 08:13) (90% - 98%)    LABS:                        10.1       )-----------( 290      ( 28 Feb 2020 07:17 )             32.4     02-27    145  |  106  |  17  ----------------------------<  163<H>  5.3<H>   |  26  |  0.8    Ca    9.5      27 Feb 2020 08:00  Mg     2.0     02-27    TPro  6.3  /  Alb  4.0  /  TBili  0.3  /  DBili  x   /  AST  11  /  ALT  9   /  AlkPhos  61  02-27    PT/INR - ( 26 Feb 2020 14:00 )   PT: 13.10 sec;   INR: 1.14 ratio         PTT - ( 26 Feb 2020 14:00 )  PTT:28.3 sec      CARDIAC MARKERS ( 27 Feb 2020 08:00 )  x     / <0.01 ng/mL / x     / x     / x      CARDIAC MARKERS ( 26 Feb 2020 14:00 )  x     / <0.01 ng/mL / x     / x     / x        RADIOLOGY:    < from: CT Angio Chest w/ IV Cont (02.26.20 @ 16:42) >  No evidence of pulmonary embolism.    Severe centrilobular emphysema.    Bibasilar small effusions with atelectasis at both lung bases.    < end of copied text >    < from: VA Duplex Lower Ext Vein Scan, Bilat (02.26.20 @ 14:36) >  No evidence of deep venous thrombosis or superficial thrombophlebitis in the bilateral lower extremities.    < end of copied text >    < from: Xray Chest 1 View- PORTABLE-Routine (02.26.20 @ 14:26) >  Unchanged small left pleural effusion/basilar opacity.    Near resolution of right basilar opacity/effusion.    Emphysema.    < end of copied text >    < from: Transthoracic Echocardiogram (02.27.20 @ 08:37) >   1. LV Ejection Fraction by Veliz's Method with a biplane EF of 56 %.   2. Mildly increased LV wall thickness.   3. Spectral Doppler shows pseudonormal pattern of left ventricular myocardial filling (Grade II diastolic dysfunction).   4. Mild mitral valve regurgitation.   5. Thickening and calcification of the anterior and posterior mitral valve leaflets.   6. Mild aortic regurgitation.    < end of copied text >    PHYSICAL EXAM:  GENERAL: NAD, sitting in bed comfortably  HEAD: Atraumatic, Normocephalic  EYES: EOMI, PERRLA, conjunctiva pink and cornea white  ENT: Normal external ears and nose, no discharges; moist mucous membranes, no erythema on posterior oropharynx  NECK: Supple, nontender to palpation; no JVD  CHEST/LUNG: Mild crackles at bilateral lung base  HEART: Regular rate and rhythm; No murmurs, rubs, or gallops  ABDOMEN: Soft, nontender, nondistended; no rebound tenderness, no guarding; no hepatomegaly; normoactive bowel sounds  EXTREMITIES:  2+ Peripheral Pulses, brisk capillary refill. No clubbing, cyanosis. 1+ petal edema on right foot  NERVOUS SYSTEM: Alert and oriented to person, time, place and situation, speech clear. No focal deficits   MSK: Residual LE weakness, 4+/5 bilaterally on UE  SKIN: No rashes or lesions    ADMISSION SUMMARY  Patient is a 84y old Female who presents with a chief complaint of shortness of breath  Currently admitted to medicine with the primary diagnosis of mild acute COPD exacerbation    ASSESSMENT & PLAN  84 year old female with PMHx of HTN,HLD, hemmorhagic CVA in 2018 with residual RLE weaknesses COPD not on home O2 ( baseline sat 92-93%) presents with shortness of breath for 2 days. Also complains of non-prod cough and some new foot swelling R>L.    In the ED patient is hemodynamically stable. Given LE swelling and shortness of breath pt had PE work up which was negative. EKG nl. Now at baseline respiratory status after solumedrol.    # Mild COPD exacerbation vs HFpEF  - CXR: Emphysema with small L pleural effusion, EKG NSR, LE Duplex negative  - Chest CT angio: Small bibasilar effusion and severe centrilobular emphysema  - , Trop negative x2, VBG shows no CO2 retention  - ECHO shows EF 54% with grade 2 diastolic dysfunction, mild MR, mild AR  - S/P Solumedrol in ED with improvement  - Continue Duonebs, Symbicort, and Prednisone 40mg q24hrs  - Pending Pulm recs    Hyperkalemia - resolved  - K 4.3    Hx of Hemorrhagic Stroke  - RLE weakness which baseline per daughter     HTN  - Stable  - C/w home meds    HLD  - C/w statin    DVT ppx: Lovenox  GI ppx: Not indicated  Diet: DASH  Activity: increase as tolerated  Lines: Peripheral IVs  Code status: full code  Dispo: pending pulm recs for discharge    Attending Attestation:    Pt has been seen and examined. Case and Plan discussed at rounds and agree with above documentation as reviewed.   Dx: DYSPNE 2/2 Emphesema and HFpEF - Diastolic dysfunction   Care per [facility]   f/u Dr. Whitt and CV  Start Spironolactone 25mg po qd  Spirometry to avoid atelectasis as with stroke doesn't exert herself  d/c home with family

## 2020-02-28 NOTE — DISCHARGE NOTE PROVIDER - HOSPITAL COURSE
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 Patient reports that the SOB was episodic and worsened by exertion and relieved by rest and is not accompanied by paroxysmal nocturnal dyspnea, orthopnea or chest pain. Patient also reports a non-productive cough for the past 2 days as well as bilateral leg swelling R>L for the past 3 days which she has never had before. Daughter at bedside states the pt was admitted to the hospital recently for norovirus and acute kidney injury. Denies any fevers, chills, chest pain, abdominal pain, recent n/v/d, back pain, syncope, lightheadedness or weight changes.         In the ED patient is hemodynamically stable. Given LE swelling and shortness of breath pt had PE work up which was negative. Now at baseline respiratory status after duo-neb and solumedrol.        # Mild COPD exacerbation vs HFpEF    - CXR: Emphysema with small L pleural effusion, EKG NSR, LE Duplex negative    - Chest CT angio: Small bibasilar effusion and severe centrilobular emphysema    - , Trop negative x2, VBG shows no CO2 retention    - ECHO shows EF 54% with grade 2 diastolic dysfunction, mild MR, mild AR    - S/P Solumedrol in ED with improvement    - Continue Duonebs, Symbicort, and Prednisone 40mg q24hrs    - Add Aldactone 25mg q24hrs for now (sulfur allergy)    - Case discussed with Dr. Whitt, patient can be discharged home with optimal medical therapy and follow up outpatient with him    - Discharge with incentive spirometry        Hyperkalemia - resolved    - K 4.3        Hx of Hemorrhagic Stroke    - RLE weakness which baseline per daughter         HTN    - Stable    - C/w home meds        HLD    - C/w statin

## 2020-02-28 NOTE — DISCHARGE NOTE NURSING/CASE MANAGEMENT/SOCIAL WORK - PATIENT PORTAL LINK FT
You can access the FollowMyHealth Patient Portal offered by [facility] by registering at the following website: [email or web address]. By joining [employer]’s FollowMyHealth portal, you will also be able to view your health information using other applications (apps) compatible with our system.

## 2020-02-28 NOTE — DISCHARGE NOTE PROVIDER - PROVIDER TOKENS
PROVIDER:[TOKEN:[[id number]],FOLLOWUP:[1 week],ESTABLISHEDPATIENT:[T]],PROVIDER:[TOKEN:[[id number]],FOLLOWUP:[1-3 days],ESTABLISHEDPATIENT:[T]]

## 2020-03-01 ENCOUNTER — EMERGENCY (EMERGENCY)
Facility: HOSPITAL | Age: 85
LOS: 0 days | Discharge: HOME | End: 2020-03-01
Attending: EMERGENCY MEDICINE | Admitting: EMERGENCY MEDICINE
Payer: MEDICARE

## 2020-03-01 VITALS
HEART RATE: 76 BPM | OXYGEN SATURATION: 92 % | TEMPERATURE: 98 F | RESPIRATION RATE: 20 BRPM | WEIGHT: 119.93 LBS | DIASTOLIC BLOOD PRESSURE: 91 MMHG | HEIGHT: 67 IN | SYSTOLIC BLOOD PRESSURE: 213 MMHG

## 2020-03-01 VITALS
DIASTOLIC BLOOD PRESSURE: 98 MMHG | SYSTOLIC BLOOD PRESSURE: 212 MMHG | HEART RATE: 70 BPM | OXYGEN SATURATION: 95 % | RESPIRATION RATE: 18 BRPM
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
## 2020-03-01 DIAGNOSIS — E78.5 HYPERLIPIDEMIA, UNSPECIFIED: ICD-10-CM

## 2020-03-01 DIAGNOSIS — R06.02 SHORTNESS OF BREATH: ICD-10-CM

## 2020-03-01 DIAGNOSIS — M25.539 PAIN IN UNSPECIFIED WRIST: ICD-10-CM

## 2020-03-01 DIAGNOSIS — S52.501A UNSPECIFIED FRACTURE OF THE LOWER END OF RIGHT RADIUS, INITIAL ENCOUNTER FOR CLOSED FRACTURE: ICD-10-CM

## 2020-03-01 DIAGNOSIS — Z88.8 ALLERGY STATUS TO OTHER DRUGS, MEDICAMENTS AND BIOLOGICAL SUBSTANCES STATUS: ICD-10-CM
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
## 2020-03-01 DIAGNOSIS — Z87.891 PERSONAL HISTORY OF NICOTINE DEPENDENCE: ICD-10-CM
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
## 2020-03-01 DIAGNOSIS — Z88.2 ALLERGY STATUS TO SULFONAMIDES: ICD-10-CM
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
## 2020-03-01 PROCEDURE — 25605 CLTX DST RDL FX/EPHYS SEP W/: CPT | Mod: 54

## 2020-03-01 PROCEDURE — 71045 X-RAY EXAM CHEST 1 VIEW: CPT | Mod: 26

## 2020-03-01 PROCEDURE — 73110 X-RAY EXAM OF WRIST: CPT | Mod: 26,RT,76

## 2020-03-01 PROCEDURE — 73090 X-RAY EXAM OF FOREARM: CPT | Mod: 26,RT

## 2020-03-01 PROCEDURE — 73100 X-RAY EXAM OF WRIST: CPT | Mod: 26,59,RT

## 2020-03-01 PROCEDURE — 73080 X-RAY EXAM OF ELBOW: CPT | Mod: 26,RT

## 2020-03-01 PROCEDURE — 72170 X-RAY EXAM OF PELVIS: CPT | Mod: 26

## 2020-03-01 PROCEDURE — 99284 EMERGENCY DEPT VISIT MOD MDM: CPT | Mod: 57,GC

## 2020-03-01 RX ORDER — MORPHINE SULFATE 50 MG/1
2 CAPSULE, EXTENDED RELEASE ORAL ONCE
Refills: 0 | Status: DISCONTINUED | OUTPATIENT
Start: 2020-03-01 | End: 2020-03-01

## 2020-03-01 RX ORDER — IPRATROPIUM/ALBUTEROL SULFATE 18-103MCG
9 AEROSOL WITH ADAPTER (GRAM) INHALATION ONCE
Refills: 0 | Status: DISCONTINUED | OUTPATIENT
Start: 2020-03-01 | End: 2020-03-01

## 2020-03-01 RX ORDER — IPRATROPIUM/ALBUTEROL SULFATE 18-103MCG
3 AEROSOL WITH ADAPTER (GRAM) INHALATION ONCE
Refills: 0 | Status: DISCONTINUED | OUTPATIENT
Start: 2020-03-01 | End: 2020-03-01

## 2020-03-01 RX ADMIN — MORPHINE SULFATE 2 MILLIGRAM(S): 50 CAPSULE, EXTENDED RELEASE ORAL at 08:20

## 2020-03-01 RX ADMIN — MORPHINE SULFATE 2 MILLIGRAM(S): 50 CAPSULE, EXTENDED RELEASE ORAL at 07:50

## 2020-03-01 NOTE — ED ADULT NURSE NOTE - OBJECTIVE STATEMENT
Patient with deformed R wrist after witness fall while being assisted to bathroom by daughter. Patient was walking with daughter to bathroom when she tripped over laundry basket and fell landing on daughter. + ROM in R hand with limited ROM in R wrist, + pulse in R wrist, no deficits in sensation. Patient denies LOC, denies AC use and head trauma.

## 2020-03-01 NOTE — ED PROVIDER NOTE - PATIENT PORTAL LINK FT
You can access the FollowMyHealth Patient Portal offered by [facility] by registering at the following website: [email or web address]. By joining [email or web address]’s FollowMyHealth portal, you will also be able to view your health information using other applications (apps) compatible with our system.

## 2020-03-01 NOTE — ED PROVIDER NOTE - ATTENDING CONTRIBUTION TO CARE
85 y/o female with h/o htn, hld, hemorrhagic CVA with residual RLE weakness, COPD - not on home O2 or inhalers, daughter states baseline O2 sat is ~ 92-93% - 83 y/o female with h/o htn, hld, hemorrhagic CVA with residual RLE weakness, COPD - not on home O2 or inhalers, daughter states baseline O2 sat is ~ 92-93% presents for evaluation after mechanical fall about 1 hour prior to arrival. The patient was walking to the bathroom with family member holding her arm. Pt tripped and fell, broke the fall with her right hand and felt pain immediately there. No head trauma, no LOC. No blood thinners. No neck pain. No fever, chills, chest pain/SOB, cough, abdominal pain, nausea, vomiting, diarrhea, dysuria. Pt ambulatory since fall. On exam, pt in NAD, AAOx3, head NC/AT, CN II-XII intact, neck (-) midline tenderness, lungs CTA B/L, CV S1S2 regular, abdomen soft/NT/ND/(+)BS, ext (+) deformity to right wrist, pulses intact, sensation intact, good , FROM of elbow. Will do XR and reevaluate.

## 2020-03-01 NOTE — ED PROVIDER NOTE - OBJECTIVE STATEMENT
The patient is a 85 yo female with PMHx of HTN, HLD, CVA residual RLE weakness, COPD not on O2, and CHF presenting after fall about 1 hour PTA. The patient was walking to the bathroom with family member holding her arm. Laundry bag in the way and patient got flustered and fell. She broke the fall with her right hand and felt pain immediately there. No head trauma, no AC. No fever, chills, chest pain, cough, abdominal pain, nausea, vomiting, diarrhea, dysuria.

## 2020-03-01 NOTE — ED ADULT NURSE NOTE - NSIMPLEMENTINTERV_GEN_ALL_ED
Implemented All Universal Safety Interventions:  Switchback to call system. Call bell, personal items and telephone within reach. Instruct patient to call for assistance. Room bathroom lighting operational. Non-slip footwear when patient is off stretcher. Physically safe environment: no spills, clutter or unnecessary equipment. Stretcher in lowest position, wheels locked, appropriate side rails in place.

## 2020-03-01 NOTE — ED PROVIDER NOTE - NS ED ROS FT
Constitutional: No altered mental status.  Eyes: No visual changes.  ENT: No hearing loss.  Neck: No neck pain or stiffness.  Cardiovascular: No chest pain, palpitations.  Pulmonary: + SOB. No hemoptysis.  Abdominal: No abdominal pain, nausea, vomiting.   : No hematuria.  Neuro: No headache, syncope, dizziness.  MS: No back pain. + right wrist deformity with pain  Psych: No suicidal ideations.

## 2020-03-01 NOTE — ED PROVIDER NOTE - PHYSICAL EXAMINATION
CONSTITUTIONAL: Well-developed; well-nourished; in no acute distress. Non toxic appearing,   SKIN: warm, dry, no acute rash, abrasions, lacerations or hematomas.  HEAD: Normocephalic; no skull indentations, no contusions or lacerations. no battles sign or raccoon eyes.   EENT:no gross trauma bilaterally, no proptosis conjunctiva and sclera clear. No entrapment.  MM moist, no nasal discharge.  No septal hematoma. Dentition intact. Pharynx unremarkable.  NECK: Supple, no midline tenderness, normal ROM, no midline ttp or stepoffs  BACK: nttp no midline ttp or stepoffs.  CHEST: No bruising, sub cutaneous emphysema, or crepitus, nttp.  CARD: S1, S2 normal; no murmurs, gallops, or rubs. Regular rate and rhythm.  RESP: No wheezes, rales or rhonchi.  ABD: soft ntnd no seatbelt sign  BACK: no midline tenderness or step offs  PELVIS: no laxity with lateral compression  EXT: right wrist with dorsal deformity, pain on palpation of wrist, able to move right hand fingers, cap refill <2 seconds  NEURO: gcs 15, moving all extremities grossly, following commands  PSYCH: Cooperative, appropriate

## 2020-03-01 NOTE — ED PROVIDER NOTE - CARE PROVIDER_API CALL
Tariq Johnson)  Orthopaedic Surgery  [address] [place] [address]  Phone: [phone]  Fax: [phone]  Follow Up Time: 1-3 Days

## 2020-03-01 NOTE — ED PROVIDER NOTE - DIAGNOSTIC INTERPRETATION
Pelvic xray - no acute fx or dislocation  Right elbow xray - no acute fx or dislocation  Right forearm xray - (+) distal radial fx  Right wrist xray - (+) distal radial fx  Right wrist xrays (s/p reduction) - (+) improved distal radial

## 2020-03-01 NOTE — ED PROVIDER NOTE - CLINICAL SUMMARY MEDICAL DECISION MAKING FREE TEXT BOX
Patient presented s/p mechanical fall onto right arm. No head trauma or LOC, complaining of isolated right wrist pain. On arrival afebrile, Hd stable GCS 15, neurovascularly intact. Obtained xrays which showed (+) distal radius fx which was displaced. Reduced wrist in ED via hanging with weights, splinted for comfort. Patient otherwise ambulatory, tolerates PO. Will discharge home with outpatient ortho follow up. Patient agreeable with plan. Agrees to return to ED for any new or worsening symptoms.

## 2020-03-01 NOTE — ED ADULT NURSE NOTE - CHPI ED NUR SYMPTOMS NEG
no bleeding/no fever/no confusion/no tingling/no vomiting/no abrasion/no numbness/no loss of consciousness

## 2020-03-04 PROBLEM — Z00.00 ENCOUNTER FOR PREVENTIVE HEALTH EXAMINATION: Status: ACTIVE | Noted: 2020-03-04

## 2020-03-05 ENCOUNTER — APPOINTMENT (OUTPATIENT)
Dept: CARE COORDINATION | Facility: HOME HEALTH | Age: 85
End: 2020-03-05

## 2020-03-09 ENCOUNTER — APPOINTMENT (OUTPATIENT)
Dept: CARE COORDINATION | Facility: HOME HEALTH | Age: 85
End: 2020-03-09

## 2020-03-09 VITALS
SYSTOLIC BLOOD PRESSURE: 110 MMHG | TEMPERATURE: 98 F | WEIGHT: 120 LBS | OXYGEN SATURATION: 99 % | HEART RATE: 64 BPM | DIASTOLIC BLOOD PRESSURE: 70 MMHG | RESPIRATION RATE: 16 BRPM

## 2020-03-10 RX ORDER — ATORVASTATIN CALCIUM 40 MG/1
40 TABLET, FILM COATED ORAL
Qty: 1 | Refills: 1 | Status: ACTIVE | COMMUNITY

## 2020-03-10 RX ORDER — BUDESONIDE AND FORMOTEROL FUMARATE DIHYDRATE 160; 4.5 UG/1; UG/1
160-4.5 AEROSOL RESPIRATORY (INHALATION) TWICE DAILY
Qty: 1 | Refills: 1 | Status: ACTIVE | COMMUNITY

## 2020-03-10 RX ORDER — PREDNISONE 50 MG/1
TABLET ORAL
Refills: 0 | Status: ACTIVE | COMMUNITY

## 2020-03-10 RX ORDER — NIFEDIPINE 90 MG/1
90 TABLET, EXTENDED RELEASE ORAL DAILY
Refills: 0 | Status: ACTIVE | COMMUNITY

## 2020-03-10 RX ORDER — ALBUTEROL 90 MCG
90 AEROSOL (GRAM) INHALATION
Refills: 0 | Status: ACTIVE | COMMUNITY

## 2020-03-10 RX ORDER — SPIRONOLACTONE 25 MG/1
25 TABLET ORAL DAILY
Refills: 0 | Status: ACTIVE | COMMUNITY

## 2020-03-10 RX ORDER — ASPIRIN 81 MG/1
81 TABLET, COATED ORAL DAILY
Refills: 0 | Status: ACTIVE | COMMUNITY

## 2020-03-10 NOTE — HISTORY OF PRESENT ILLNESS
[FreeTextEntry1] : patient seen at home s/p discharge from [facility] for CHF.exacerbation  [de-identified] : Patient is a 83 y/o with a PMH of HTN, CVA, COPD and CHF female enrolled in the [facility] program  seen at home s/p recent discharge from [facility] for CHF exacerbation, on arrival to home patient a0x3 denies c/p, sob, wt gain or BLLe swelling , she resides with daughter an RN and has follow up scheduled today for orthopedic for a wrist fx.

## 2020-03-10 NOTE — PLAN
[FreeTextEntry1] : CHF- daily weights , low salt diet , c/w current medication regimen \par COPD- c/w symbicort , albuterol \par c/w current medication regimen\par pcp / pulmonary follow up advised

## 2020-03-10 NOTE — COUNSELING
[de-identified] : Pt was informed about CN’s role/ STARS program and overview of transitional care reviewed with patient. In addition, yellow contact card was provided. Pt educated on topics of importance such as compliance with all provider visits, prescribed medication regimen, and low salt diet. Pt encouraged calling CN with any issues, concerns or questions, also educated to notify CN if experiencing CP, SOB , cough, increased mucus/phlegm production, abdominal discomfort/swelling, difficulty sleeping or lying flat, fever, chills, fatigue, weight gain of 2-3lbs in 24 hours or 5lbs in one week,  dizziness, lightheadedness, n/v/d/c, swelling to extremities and/or any signs of CHF exacerbation as reviewed. Reassurance provided. Will continue to monitor\par \par \par

## 2020-03-10 NOTE — PHYSICAL EXAM
[No Acute Distress] : no acute distress [Normal Sclera/Conjunctiva] : normal sclera/conjunctiva [PERRL] : pupils equal round and reactive to light [No JVD] : no jugular venous distention [Supple] : supple [No Respiratory Distress] : no respiratory distress  [No Accessory Muscle Use] : no accessory muscle use [Clear to Auscultation] : lungs were clear to auscultation bilaterally [Normal Rate] : normal rate  [Regular Rhythm] : with a regular rhythm [Normal S1, S2] : normal S1 and S2 [No Murmur] : no murmur heard [Pedal Pulses Present] : the pedal pulses are present [No Edema] : there was no peripheral edema [Soft] : abdomen soft [Non Tender] : non-tender [Non-distended] : non-distended [No Rash] : no rash [No Focal Deficits] : no focal deficits [Normal Affect] : the affect was normal [Normal Insight/Judgement] : insight and judgment were intact [de-identified] : wheelchair bound

## 2020-03-11 NOTE — ED PROCEDURE NOTE - NS ED PERI VASCULAR NEG
agitation/non-verbal indicator of pain/discomfort present
no swelling/capillary refill time < 2 seconds/no paresthesia/fingers/toes warm to touch/no cyanosis of extremity
fingers/toes warm to touch/capillary refill time < 2 seconds/no swelling/no paresthesia/no cyanosis of extremity

## 2020-03-11 NOTE — ED PROCEDURE NOTE - CPROC ED POST PROC CARE GUIDE1
Elevate the injured extremity as instructed./Verbal/written post procedure instructions were given to patient/caregiver./Instructed patient/caregiver to follow-up with primary care physician./Instructed patient/caregiver regarding signs and symptoms of infection./Keep the cast/splint/dressing clean and dry.
Verbal/written post procedure instructions were given to patient/caregiver./Elevate the injured extremity as instructed./Keep the cast/splint/dressing clean and dry./Instructed patient/caregiver to follow-up with primary care physician.

## 2020-03-11 NOTE — ED PROCEDURE NOTE - NS ED PERI NEURO NEG
Post-application: Motor, sensory, and vascular responses intact in the injured extremity./The patient/caregiver verbalized understanding of how to care for the injured extremity with splint/Pre-application: Motor, sensory, and vascular responses intact in the injured extremity.
The patient/caregiver verbalized understanding of how to care for the injured extremity with splint/Pre-application: Motor, sensory, and vascular responses intact in the injured extremity./Post-application: Motor, sensory, and vascular responses intact in the injured extremity.

## 2020-03-18 ENCOUNTER — NON-APPOINTMENT (OUTPATIENT)
Age: 85
End: 2020-03-18

## 2020-04-06 NOTE — ED ADULT NURSE NOTE - EXTENSIONS OF SELF_ADULT
You can access the FollowMyHealth Patient Portal offered by [facility] by registering at the following website: [email or web address]. By joining [facility]’s FollowMyHealth portal, you will also be able to view your health information using other applications (apps) compatible with our system. None

## 2021-04-20 NOTE — PRE-OP CHECKLIST - LAST DOSE WITHIN LAST 24HRS
[Mother] : mother [Yes] : Patient goes to dentist yearly [Toothpaste] : Primary Fluoride Source: Toothpaste [Delayed] : delayed [Eats meals with family] : eats meals with family [Has family members/adults to turn to for help] : has family members/adults to turn to for help [Is permitted and is able to make independent decisions] : Is permitted and is able to make independent decisions [Grade: ____] : Grade: [unfilled] [Normal Performance] : normal performance [Normal Behavior/Attention] : normal behavior/attention [Normal Homework] : normal homework [Eats regular meals including adequate fruits and vegetables] : eats regular meals including adequate fruits and vegetables Yes [Drinks non-sweetened liquids] : drinks non-sweetened liquids  [Calcium source] : calcium source [Has friends] : has friends [At least 1 hour of physical activity a day] : at least 1 hour of physical activity a day [Screen time (except homework) less than 2 hours a day] : screen time (except homework) less than 2 hours a day [Has interests/participates in community activities/volunteers] : has interests/participates in community activities/volunteers. [Uses safety belts/safety equipment] : uses safety belts/safety equipment  [Has peer relationships free of violence] : has peer relationships free of violence [No] : Patient has not had sexual intercourse [Has ways to cope with stress] : has ways to cope with stress [Displays self-confidence] : displays self-confidence [With Teen] : teen [With Parent/Guardian] : parent/guardian [Sleep Concerns] : no sleep concerns [Has concerns about body or appearance] : does not have concerns about body or appearance [Uses electronic nicotine delivery system] : does not use electronic nicotine delivery system [Exposure to electronic nicotine delivery system] : no exposure to electronic nicotine delivery system [Uses tobacco] : does not use tobacco [Exposure to tobacco] : no exposure to tobacco [Uses drugs] : does not use drugs  [Exposure to drugs] : no exposure to drugs [Drinks alcohol] : does not drink alcohol [Exposure to alcohol] : no exposure to alcohol [Impaired/distracted driving] : no impaired/distracted driving [Has problems with sleep] : does not have problems with sleep [Gets depressed, anxious, or irritable/has mood swings] : does not get depressed, anxious, or irritable/has mood swings [Has thought about hurting self or considered suicide] : has not thought about hurting self or considered suicide [FreeTextEntry1] : 16 year old male here for well care examination

## 2021-06-07 NOTE — PATIENT PROFILE ADULT. - FUNCTIONAL SCREEN CURRENT LEVEL: COMMUNICATION, MLM
"Anticoagulation Annual Referral Renewal Review    Jeimy Lynch's chart reviewed for annual renewal of referral to anticoagulation monitoring.        Criteria for anticoagulation nurse and/or pharmacist renewal met   Warfarin indication: Hypercoagulable state: factor V Leide, antiphospholipid syndrome Yes, per indication   Current with INR monitoring/compliant Yes Yes   Date of last office visit 11/21/18 No, last office visit more than a year ago   Time in Therapeutic Range (TTR) 62.1 % Yes, TTR > 60%       Jeimy Lynch did NOT meet all criteria for anticoagulation management program initiated renewal and requires provider review. Using dot phrase, \".acmrenewalprovider\", please advise if Jeimy's anticoagulation management referral should be renewed or if patient should be seen in office to review anticoagulation therapy      Karen Rivera RN  5:16 PM      " (0) understands/communicates without difficulty

## 2021-07-16 NOTE — PRE-ANESTHESIA EVALUATION ADULT - HEART RATE (BEATS/MIN)
Physical Therapy  Visit Type: initial evaluation  Treatment diagnosis: Numbness LUE  Precautions:  Medical precautions:  fall risk;. Hx of HTN, migraines    SUBJECTIVE  Patient agreed to participate in therapy this date.  Patient verbally agrees to allow the following to be present during session: mother  Reporting numbness still in LUE, and L side of face, tongue numbness resolved  Prior treatment in the past year for same condition: no therapiesPatient has not been hospitalized, in a skilled nursing facility, or seen by home health in the last 30 days.  Patient / Family Goal: return to previous functional status    Pain   RN informed on pain level     OBJECTIVE     Oriented to person, place, time and situation     Arousal alertness: appropriate responses to stimuli  Patient activity tolerance: 1 to 1 activity to rest    Bed Mobility:    Rolling left: modified independent    Rolling right: modified independent    Repositioning in bed: modified independent      Supine to sit: modified independent    Sit to supine: modified independent  Training completed:    Tasks: all aspects of bed mobility    Education details: body mechanics and patient safety  Transfers:    Assistive devices: 1 person and gait belt    Sit to stand: modified independent    Stand to sit: modified independent    Stand pivot: modified independent  Training completed:    Tasks: sit to stand and stand to sit    Education details: body mechanics and patient safety  Gait/Ambulation:     Assistance: modified independent   Assistive device: 1 person, gait belt and none    Distance (ft): 450    Pattern: within functional limits  Training Completed:    Tasks: gait training on level surfaces    Education details: body mechanics and patient safety      Interventions     Training provided: activity tolerance, balance retraining, bed mobility training, compensatory techniques, energy conservation, functional ambulation, gait training, positioning, safety  training and transfer training    Skilled input: Verbal instruction/cues and tactile instruction/cues       ASSESSMENT    Impairments: range of motion, strength, balance deficits, safety awareness, pain, coordination and endurance  Functional Limitations: all functional mobility    Patient seen on 5th floor nursing unit.  Patient presents below baseline which was independent with mobility using no device and previously living in apartment. For safe return to prior living situation the patient needs to be modified independent  for overall mobility. Patient has past medical history of HTN, migraines and admitted with LUE numbness, facial numbness L.14 day re-assessment date : 7/30/21.    14 day re-assessment required this date: No      Physical Therapy evaluation session today focused on full bed mobility, transfer trials, increasing ambulation distance with no device, higher level balance activities. Patient is currently functioning at modified independent x 1 for overall mobility using no device. She does note that her tongue numbness is better now than yesterday. Vision is WFL, no pain, no dizziness, waiting on neuro consult.            Discharge Recommendations:  Recommendation for Discharge: PT WI: Home             PT/OT Mobility Equipment for Discharge: continue to assess      PT Identified Barriers to Discharge: decreased strength, numbness   Skilled therapy is required to address these limitations in attempt to maximize the patient's independence.  Progress: progressing toward goals  Predicted patient presentation: Low (stable) - Patient comorbidities and complexities, as defined above, will have little effect on progress for prescribed plan of care.    End of Session:   Handoff to: nurse    PLAN    Suggestions for next session as indicated: Check for neuro notes and/or possible 3rd MRI results, discuss OP PT pending deficits, patient may be independent however just double checking based on what neuro finds,  higher level balance activities as able          Frequency Comments: SAT, M-F, OBS (H vs OP) ADM   Interventions: balance, bed mobility, modalities, gait training, energy conservation, body mechanics, safety education, ROM, strengthening and patient/family training  Agreement to plan and goals: patient agrees with goals and treatment plan        GOALS  Review Date: 7/30/2021  Long Term Goals: (to be met by time of discharge from hospital)  Sit to supine: Patient will complete sit to supine modified independent.  Supine to sit: Patient will complete supine to sit modified independent.  Sit to stand: Patient will complete sit to stand transfer with gait belt, modified independent.   Ambulation (even): Patient will ambulate on even surface for 150 feet with gait belt, modified independent.   Stairs: Patient will ambulate 12 steps with modified independent.     Documented in the chart in the following areas: Pain. Assessment.      Admitting diagnosis: Left sided numbness (R20.0)    Co-morbidities and problem list:   Patient Active Problem List:   High-risk pregnancy, third trimester   Chronic hypertension in pregnancy    Treatment Diagnosis: Numbness LUE    The referring provider's electronic signature on the evaluation authorizes the therapy plan of care and certifies the need for these services, furnished under this plan of care while under their care.      Therapy procedure time and total treatment time can be found documented on the Time Entry flowsheet   77

## 2021-11-05 NOTE — PRE-OP CHECKLIST - HAIR REMOVAL
[facility]  hair removal not indicated

## 2021-12-28 NOTE — PROGRESS NOTE ADULT - SUBJECTIVE AND OBJECTIVE BOX
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  patient states that her legs gave out from underneath her and she was unable to get up due to weakness. Was on the floor for approximately 1 hour before her  found her and tried to help her up. came to the ED 3 hours after initial fall. In the ED patient found to be hypertensive /95, NIHSS 7, stroke code called and patient found to have an intraparenchymal bleed.   intially during interview patient denied any symptoms including headache but immediately upon leaving room patient states that she does have a frontal headache.    in december 2017 patient had a mechanical fall, tripped over roadside curb getting out of the car and had inability to ambulate. required assistance to get indoors. went to see PMD, had a swollen ankle, not acute fracture noted from event, weakness resolved spontaneously. (23 Apr 2018 23:10)      MEDICATIONS  (STANDING):  amLODIPine   Tablet 10 milliGRAM(s) Oral every 24 hours  atorvastatin 40 milliGRAM(s) Oral at bedtime  labetalol 100 milliGRAM(s) Oral three times a day  levETIRAcetam  IVPB 500 milliGRAM(s) IV Intermittent every 12 hours  niCARdipine Infusion 5 mG/Hr (25 mL/Hr) IV Continuous <Continuous>  sodium chloride 3%. 500 milliLiter(s) (50 mL/Hr) IV Continuous <Continuous>    MEDICATIONS  (PRN):  acetaminophen   Tablet. 650 milliGRAM(s) Oral every 6 hours PRN Mild Pain (1 - 3)        exam:    PERRLA, EOMI  face symmetrical  right sided arm and leg hemiparesis 0/5 motors + sensory  left side good motors    Plan:  < from: CT Head No Cont (04.23.18 @ 19:10) >  EXAM:  CT BRAIN            PROCEDURE DATE:  04/23/2018    IMPRESSION:    Acute intracranial parenchymal hemorrhage within the left medial   posterior parietal region measuring approximately 3.4 cm. Mild   surrounding edema without significant mass effect.    < end of copied text >      Keppra   on 3% NS at this time , will consider dc'ing this when repeat hct is done  heparin sc after HCT is done and stable  discussed with dr murry. Finasteride Pregnancy And Lactation Text: This medication is absolutely contraindicated during pregnancy. It is unknown if it is excreted in breast milk.

## 2022-08-13 ENCOUNTER — INPATIENT (INPATIENT)
Facility: HOSPITAL | Age: 87
LOS: 3 days | Discharge: HOME HEALTH | End: 2022-08-17
Attending: STUDENT IN AN ORGANIZED HEALTH CARE EDUCATION/TRAINING PROGRAM | Admitting: STUDENT IN AN ORGANIZED HEALTH CARE EDUCATION/TRAINING PROGRAM

## 2022-08-13 VITALS
DIASTOLIC BLOOD PRESSURE: 52 MMHG | WEIGHT: 130.07 LBS | TEMPERATURE: 98 F | SYSTOLIC BLOOD PRESSURE: 89 MMHG | RESPIRATION RATE: 16 BRPM | HEIGHT: 67 IN | HEART RATE: 59 BPM | OXYGEN SATURATION: 92 %

## 2022-08-13 LAB
ALBUMIN SERPL ELPH-MCNC: 4 G/DL — SIGNIFICANT CHANGE UP (ref 3.5–5.2)
ALP SERPL-CCNC: 51 U/L — SIGNIFICANT CHANGE UP (ref 30–115)
ALT FLD-CCNC: 9 U/L — SIGNIFICANT CHANGE UP (ref 0–41)
ANION GAP SERPL CALC-SCNC: 11 MMOL/L — SIGNIFICANT CHANGE UP (ref 7–14)
ANION GAP SERPL CALC-SCNC: 12 MMOL/L — SIGNIFICANT CHANGE UP (ref 7–14)
AST SERPL-CCNC: 11 U/L — SIGNIFICANT CHANGE UP (ref 0–41)
BASE EXCESS BLDV CALC-SCNC: -9.2 MMOL/L — LOW (ref -2–3)
BASE EXCESS BLDV CALC-SCNC: -9.3 MMOL/L — LOW (ref -2–3)
BASOPHILS # BLD AUTO: 0.01 K/UL — SIGNIFICANT CHANGE UP (ref 0–0.2)
BASOPHILS NFR BLD AUTO: 0.1 % — SIGNIFICANT CHANGE UP (ref 0–1)
BILIRUB SERPL-MCNC: <0.2 MG/DL — SIGNIFICANT CHANGE UP (ref 0.2–1.2)
BUN SERPL-MCNC: 58 MG/DL — HIGH (ref 10–20)
BUN SERPL-MCNC: 59 MG/DL — HIGH (ref 10–20)
CA-I SERPL-SCNC: 1.22 MMOL/L — SIGNIFICANT CHANGE UP (ref 1.15–1.33)
CA-I SERPL-SCNC: 1.27 MMOL/L — SIGNIFICANT CHANGE UP (ref 1.15–1.33)
CALCIUM SERPL-MCNC: 8.8 MG/DL — SIGNIFICANT CHANGE UP (ref 8.5–10.1)
CALCIUM SERPL-MCNC: 9 MG/DL — SIGNIFICANT CHANGE UP (ref 8.5–10.1)
CHLORIDE SERPL-SCNC: 105 MMOL/L — SIGNIFICANT CHANGE UP (ref 98–110)
CHLORIDE SERPL-SCNC: 106 MMOL/L — SIGNIFICANT CHANGE UP (ref 98–110)
CO2 SERPL-SCNC: 15 MMOL/L — LOW (ref 17–32)
CO2 SERPL-SCNC: 17 MMOL/L — SIGNIFICANT CHANGE UP (ref 17–32)
CREAT SERPL-MCNC: 2.8 MG/DL — HIGH (ref 0.7–1.5)
CREAT SERPL-MCNC: 2.9 MG/DL — HIGH (ref 0.7–1.5)
CRP SERPL-MCNC: 104.5 MG/L — HIGH
D DIMER BLD IA.RAPID-MCNC: 217 NG/ML DDU — SIGNIFICANT CHANGE UP (ref 0–230)
EGFR: 15 ML/MIN/1.73M2 — LOW
EGFR: 16 ML/MIN/1.73M2 — LOW
EOSINOPHIL # BLD AUTO: 0.07 K/UL — SIGNIFICANT CHANGE UP (ref 0–0.7)
EOSINOPHIL NFR BLD AUTO: 0.8 % — SIGNIFICANT CHANGE UP (ref 0–8)
GAS PNL BLDV: 128 MMOL/L — LOW (ref 136–145)
GAS PNL BLDV: 130 MMOL/L — LOW (ref 136–145)
GAS PNL BLDV: SIGNIFICANT CHANGE UP
GAS PNL BLDV: SIGNIFICANT CHANGE UP
GLUCOSE SERPL-MCNC: 109 MG/DL — HIGH (ref 70–99)
GLUCOSE SERPL-MCNC: 90 MG/DL — SIGNIFICANT CHANGE UP (ref 70–99)
HCO3 BLDV-SCNC: 16 MMOL/L — LOW (ref 22–29)
HCO3 BLDV-SCNC: 17 MMOL/L — LOW (ref 22–29)
HCT VFR BLD CALC: 30.3 % — LOW (ref 37–47)
HCT VFR BLDA CALC: 28 % — LOW (ref 39–51)
HCT VFR BLDA CALC: 29 % — LOW (ref 39–51)
HGB BLD CALC-MCNC: 9.4 G/DL — LOW (ref 12.6–17.4)
HGB BLD CALC-MCNC: 9.5 G/DL — LOW (ref 12.6–17.4)
HGB BLD-MCNC: 10 G/DL — LOW (ref 12–16)
IMM GRANULOCYTES NFR BLD AUTO: 0.6 % — HIGH (ref 0.1–0.3)
LACTATE BLDV-MCNC: 1.2 MMOL/L — SIGNIFICANT CHANGE UP (ref 0.5–2)
LACTATE BLDV-MCNC: 1.2 MMOL/L — SIGNIFICANT CHANGE UP (ref 0.5–2)
LDH SERPL L TO P-CCNC: 251 — HIGH (ref 50–242)
LYMPHOCYTES # BLD AUTO: 0.51 K/UL — LOW (ref 1.2–3.4)
LYMPHOCYTES # BLD AUTO: 5.7 % — LOW (ref 20.5–51.1)
MCHC RBC-ENTMCNC: 28.2 PG — SIGNIFICANT CHANGE UP (ref 27–31)
MCHC RBC-ENTMCNC: 33 G/DL — SIGNIFICANT CHANGE UP (ref 32–37)
MCV RBC AUTO: 85.6 FL — SIGNIFICANT CHANGE UP (ref 81–99)
MONOCYTES # BLD AUTO: 0.56 K/UL — SIGNIFICANT CHANGE UP (ref 0.1–0.6)
MONOCYTES NFR BLD AUTO: 6.3 % — SIGNIFICANT CHANGE UP (ref 1.7–9.3)
NEUTROPHILS # BLD AUTO: 7.76 K/UL — HIGH (ref 1.4–6.5)
NEUTROPHILS NFR BLD AUTO: 86.5 % — HIGH (ref 42.2–75.2)
NRBC # BLD: 0 /100 WBCS — SIGNIFICANT CHANGE UP (ref 0–0)
PCO2 BLDV: 33 MMHG — LOW (ref 39–42)
PCO2 BLDV: 37 MMHG — LOW (ref 39–42)
PH BLDV: 7.27 — LOW (ref 7.32–7.43)
PH BLDV: 7.3 — LOW (ref 7.32–7.43)
PLATELET # BLD AUTO: 206 K/UL — SIGNIFICANT CHANGE UP (ref 130–400)
PO2 BLDV: 40 MMHG — SIGNIFICANT CHANGE UP
PO2 BLDV: 49 MMHG — SIGNIFICANT CHANGE UP
POTASSIUM BLDV-SCNC: 6.7 MMOL/L — CRITICAL HIGH (ref 3.5–5.1)
POTASSIUM BLDV-SCNC: 7.6 MMOL/L — CRITICAL HIGH (ref 3.5–5.1)
POTASSIUM SERPL-MCNC: 7.2 MMOL/L — CRITICAL HIGH (ref 3.5–5)
POTASSIUM SERPL-MCNC: 7.3 MMOL/L — CRITICAL HIGH (ref 3.5–5)
POTASSIUM SERPL-SCNC: 7.2 MMOL/L — CRITICAL HIGH (ref 3.5–5)
POTASSIUM SERPL-SCNC: 7.3 MMOL/L — CRITICAL HIGH (ref 3.5–5)
PROT SERPL-MCNC: 6.3 G/DL — SIGNIFICANT CHANGE UP (ref 6–8)
RBC # BLD: 3.54 M/UL — LOW (ref 4.2–5.4)
RBC # FLD: 14.8 % — HIGH (ref 11.5–14.5)
SAO2 % BLDV: 73.5 % — SIGNIFICANT CHANGE UP
SAO2 % BLDV: 84.9 % — SIGNIFICANT CHANGE UP
SARS-COV-2 RNA SPEC QL NAA+PROBE: DETECTED
SODIUM SERPL-SCNC: 132 MMOL/L — LOW (ref 135–146)
SODIUM SERPL-SCNC: 134 MMOL/L — LOW (ref 135–146)
TROPONIN T SERPL-MCNC: <0.01 NG/ML — SIGNIFICANT CHANGE UP
WBC # BLD: 8.96 K/UL — SIGNIFICANT CHANGE UP (ref 4.8–10.8)
WBC # FLD AUTO: 8.96 K/UL — SIGNIFICANT CHANGE UP (ref 4.8–10.8)

## 2022-08-13 PROCEDURE — 99285 EMERGENCY DEPT VISIT HI MDM: CPT | Mod: CS

## 2022-08-13 PROCEDURE — 99223 1ST HOSP IP/OBS HIGH 75: CPT | Mod: GC

## 2022-08-13 PROCEDURE — 71045 X-RAY EXAM CHEST 1 VIEW: CPT | Mod: 26

## 2022-08-13 PROCEDURE — 93010 ELECTROCARDIOGRAM REPORT: CPT

## 2022-08-13 RX ORDER — ALBUTEROL 90 UG/1
2.5 AEROSOL, METERED ORAL
Refills: 0 | Status: DISCONTINUED | OUTPATIENT
Start: 2022-08-13 | End: 2022-08-13

## 2022-08-13 RX ORDER — INSULIN HUMAN 100 [IU]/ML
10 INJECTION, SOLUTION SUBCUTANEOUS ONCE
Refills: 0 | Status: COMPLETED | OUTPATIENT
Start: 2022-08-13 | End: 2022-08-13

## 2022-08-13 RX ORDER — CHOLECALCIFEROL (VITAMIN D3) 125 MCG
1 CAPSULE ORAL
Qty: 0 | Refills: 0 | DISCHARGE

## 2022-08-13 RX ORDER — ASPIRIN/CALCIUM CARB/MAGNESIUM 324 MG
81 TABLET ORAL DAILY
Refills: 0 | Status: DISCONTINUED | OUTPATIENT
Start: 2022-08-13 | End: 2022-08-17

## 2022-08-13 RX ORDER — IPRATROPIUM/ALBUTEROL SULFATE 18-103MCG
3 AEROSOL WITH ADAPTER (GRAM) INHALATION
Refills: 0 | Status: COMPLETED | OUTPATIENT
Start: 2022-08-13 | End: 2022-08-13

## 2022-08-13 RX ORDER — RANITIDINE HYDROCHLORIDE 150 MG/1
0 TABLET, FILM COATED ORAL
Qty: 0 | Refills: 0 | DISCHARGE

## 2022-08-13 RX ORDER — CHOLECALCIFEROL (VITAMIN D3) 125 MCG
1000 CAPSULE ORAL DAILY
Refills: 0 | Status: DISCONTINUED | OUTPATIENT
Start: 2022-08-13 | End: 2022-08-17

## 2022-08-13 RX ORDER — LABETALOL HCL 100 MG
200 TABLET ORAL
Refills: 0 | Status: DISCONTINUED | OUTPATIENT
Start: 2022-08-13 | End: 2022-08-14

## 2022-08-13 RX ORDER — DEXTROSE 10 % IN WATER 10 %
250 INTRAVENOUS SOLUTION INTRAVENOUS ONCE
Refills: 0 | Status: DISCONTINUED | OUTPATIENT
Start: 2022-08-13 | End: 2022-08-13

## 2022-08-13 RX ORDER — INSULIN HUMAN 100 [IU]/ML
50 INJECTION, SOLUTION SUBCUTANEOUS ONCE
Refills: 0 | Status: COMPLETED | OUTPATIENT
Start: 2022-08-13 | End: 2022-08-13

## 2022-08-13 RX ORDER — SODIUM CHLORIDE 9 MG/ML
1000 INJECTION INTRAMUSCULAR; INTRAVENOUS; SUBCUTANEOUS
Refills: 0 | Status: DISCONTINUED | OUTPATIENT
Start: 2022-08-13 | End: 2022-08-14

## 2022-08-13 RX ORDER — HEPARIN SODIUM 5000 [USP'U]/ML
5000 INJECTION INTRAVENOUS; SUBCUTANEOUS EVERY 12 HOURS
Refills: 0 | Status: DISCONTINUED | OUTPATIENT
Start: 2022-08-13 | End: 2022-08-17

## 2022-08-13 RX ORDER — DEXTROSE 50 % IN WATER 50 %
25 SYRINGE (ML) INTRAVENOUS ONCE
Refills: 0 | Status: COMPLETED | OUTPATIENT
Start: 2022-08-13 | End: 2022-08-13

## 2022-08-13 RX ORDER — CALCIUM GLUCONATE 100 MG/ML
1 VIAL (ML) INTRAVENOUS ONCE
Refills: 0 | Status: COMPLETED | OUTPATIENT
Start: 2022-08-13 | End: 2022-08-13

## 2022-08-13 RX ORDER — SODIUM CHLORIDE 9 MG/ML
1000 INJECTION, SOLUTION INTRAVENOUS ONCE
Refills: 0 | Status: COMPLETED | OUTPATIENT
Start: 2022-08-13 | End: 2022-08-13

## 2022-08-13 RX ORDER — NIFEDIPINE 30 MG
90 TABLET, EXTENDED RELEASE 24 HR ORAL DAILY
Refills: 0 | Status: DISCONTINUED | OUTPATIENT
Start: 2022-08-13 | End: 2022-08-14

## 2022-08-13 RX ORDER — ACETAMINOPHEN 500 MG
650 TABLET ORAL EVERY 4 HOURS
Refills: 0 | Status: DISCONTINUED | OUTPATIENT
Start: 2022-08-13 | End: 2022-08-17

## 2022-08-13 RX ORDER — SERTRALINE 25 MG/1
100 TABLET, FILM COATED ORAL DAILY
Refills: 0 | Status: DISCONTINUED | OUTPATIENT
Start: 2022-08-13 | End: 2022-08-17

## 2022-08-13 RX ORDER — SODIUM ZIRCONIUM CYCLOSILICATE 10 G/10G
10 POWDER, FOR SUSPENSION ORAL DAILY
Refills: 0 | Status: DISCONTINUED | OUTPATIENT
Start: 2022-08-13 | End: 2022-08-14

## 2022-08-13 RX ORDER — DEXTROSE 50 % IN WATER 50 %
50 SYRINGE (ML) INTRAVENOUS ONCE
Refills: 0 | Status: COMPLETED | OUTPATIENT
Start: 2022-08-13 | End: 2022-08-13

## 2022-08-13 RX ORDER — DEXTROSE 50 % IN WATER 50 %
50 SYRINGE (ML) INTRAVENOUS ONCE
Refills: 0 | Status: DISCONTINUED | OUTPATIENT
Start: 2022-08-13 | End: 2022-08-13

## 2022-08-13 RX ORDER — DEXAMETHASONE 0.5 MG/5ML
6 ELIXIR ORAL DAILY
Refills: 0 | Status: DISCONTINUED | OUTPATIENT
Start: 2022-08-13 | End: 2022-08-15

## 2022-08-13 RX ADMIN — INSULIN HUMAN 10 UNIT(S): 100 INJECTION, SOLUTION SUBCUTANEOUS at 22:53

## 2022-08-13 RX ADMIN — Medication 6 MILLIGRAM(S): at 19:06

## 2022-08-13 RX ADMIN — SODIUM CHLORIDE 50 MILLILITER(S): 9 INJECTION INTRAMUSCULAR; INTRAVENOUS; SUBCUTANEOUS at 21:19

## 2022-08-13 RX ADMIN — Medication 3 MILLILITER(S): at 14:15

## 2022-08-13 RX ADMIN — INSULIN HUMAN 10 UNIT(S): 100 INJECTION, SOLUTION SUBCUTANEOUS at 16:55

## 2022-08-13 RX ADMIN — SODIUM CHLORIDE 50 MILLILITER(S): 9 INJECTION INTRAMUSCULAR; INTRAVENOUS; SUBCUTANEOUS at 22:53

## 2022-08-13 RX ADMIN — Medication 25 GRAM(S): at 16:55

## 2022-08-13 RX ADMIN — Medication 50 MILLILITER(S): at 22:58

## 2022-08-13 RX ADMIN — SODIUM CHLORIDE 1000 MILLILITER(S): 9 INJECTION, SOLUTION INTRAVENOUS at 15:14

## 2022-08-13 RX ADMIN — Medication 3 MILLILITER(S): at 15:00

## 2022-08-13 RX ADMIN — Medication 100 GRAM(S): at 16:54

## 2022-08-13 RX ADMIN — SODIUM ZIRCONIUM CYCLOSILICATE 10 GRAM(S): 10 POWDER, FOR SUSPENSION ORAL at 21:20

## 2022-08-13 RX ADMIN — Medication 3 MILLILITER(S): at 15:16

## 2022-08-13 NOTE — ED ADULT TRIAGE NOTE - CHIEF COMPLAINT QUOTE
weakness, diarrhea, decreased intake, confusion started after taking paxlovid. on paxlovid since last wednesday  + covid since wednesday.

## 2022-08-13 NOTE — H&P ADULT - ASSESSMENT
86y  F with PMH of HTN, CVA with Rt residual deficit, HLD, COPD not on home O2, CHF presented to the ED for recent COVID diagnosis, worsening shortness of breath and diarrhea. Patient was given Paxclovid with outpatient but for the past 24 to 48 hours has had worsening weakness, diarrhea, cough and shortness of breath.  Patient at triage was satting in the low 80s and hypotensive.  Patient has no other complaints but is feeling otherwise weak.    # COVID 19 pneumonia  - Currently on 3lpm O2 via NC  - Received 3 days of paxlovid outpatient  - Inflammatory Markers:   08-13-22 @ 21:00--> D-dimer: 217  |  Ferritin: --  |  CRP: 104.5  |  Procalcitonin: --  |  LDH: 251   - follow up markers  - Vaccination status: Received Pfeizer vaccine + booster  - Chest Imaging: CXR - bilateral patchy opacities   - Start on Dexamethasone 6mg IV OD  - Supportive care: Antitussive PRN cough, Tylenol PRN fever  - VTE prophylaxis: Heparin  - Isolation Precaution  - Incentive spirometry  - ID eval    # Diarrhea  - likely secondary to COVID vs Paxlovid side effect  - IV hydration  - check GI PCR    # MARY  # Hyperkalemia  - Likely prerenal sec to diarrhea  - IV hydration  - send UA, Urine lytes (Sodium, creatinine) and Check FENa  - Renal US  - Strict I/O  - BMP daily  - Hold ACEi, Spironolactone  - Nephro eval  - s/p Insulin/dextrose, Lokelma for hyperkalemia    # CVA with residual Rt deficit  - continue with Aspirin  - wheelchair bound  - ?not on statin at home    # HTN  - Hold antihypertensives while borderline BP and MARY  - resume as BP tolerates    # COPD  - Controlled off medications  - outpatient Pulm eval  - Duonebs PRN    # Diet: DASH/TLC  # Activity: IAT  # GI PPX: not indicated  # DVT PPX: heparin

## 2022-08-13 NOTE — ED ADULT NURSE REASSESSMENT NOTE - NS ED NURSE REASSESS COMMENT FT1
Patient assessed bedside.  A/O x 4.  Cardiac monitoring ongoing.  No S/S of acute pain or distress at this time.  Pt admitted to SDU awaiting bed placement. Safety and comfort maintained.

## 2022-08-13 NOTE — ED PROVIDER NOTE - OBJECTIVE STATEMENT
86-year-old female past medical history hypertension hyperlipidemia CVA COPD not on any home oxygen CHF coming in here for recent COVID diagnosis.  Patient was given Paxclovid with outpatient but for the past 24 to 48 hours has had worsening weakness sore throat cough and shortness of breath.  Patient at triage was satting in the low 80s and hypotensive.  Patient has no other complaints but is feeling otherwise weak.

## 2022-08-13 NOTE — H&P ADULT - HISTORY OF PRESENT ILLNESS
86y  F with PMH of HTN  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     presented to the ED with c/o .   HPI  86-year-old female past medical history hypertension hyperlipidemia CVA COPD not on any home oxygen CHF coming in here for recent COVID diagnosis.  Patient was given Paxclovid with outpatient but for the past 24 to 48 hours has had worsening weakness sore throat cough and shortness of breath.  Patient at triage was satting in the low 80s and hypotensive.  Patient has no other complaints but is feeling otherwise weak.    ED vitals:   T(F): 97.9 (08-13 @ 13:45), Max: 97.9 (08-13 @ 13:45)  HR: 68 (08-13 @ 23:00) (59 - 69)  BP: 111/58 (08-13 @ 23:00) (89/52 - 111/58)  RR: 18 (08-13 @ 23:00) (16 - 18)  SpO2: 90% (08-13 @ 23:00) (90% - 93%)    ED workup:       LABS: Personally reviewed labs, imaging, and ECG                          10.0   8.96  )-----------( 206      ( 13 Aug 2022 15:44 )             30.3       08-13    132<L>  |  106  |  59<H>  ----------------------------<  90  7.2<HH>   |  15<L>  |  2.8<H>    Ca    9.0      13 Aug 2022 21:00    TPro  6.3  /  Alb  4.0  /  TBili  <0.2  /  DBili  x   /  AST  11  /  ALT  9   /  AlkPhos  51  08-13       LIVER FUNCTIONS - ( 13 Aug 2022 15:44 )  Alb: 4.0 g/dL / Pro: 6.3 g/dL / ALK PHOS: 51 U/L / ALT: 9 U/L / AST: 11 U/L / GGT: x                            Lactate Trend      CARDIAC MARKERS ( 13 Aug 2022 15:44 )  x     / <0.01 ng/mL / x     / x     / x            CAPILLARY BLOOD GLUCOSE            RADIOLOGY & ADDITIONAL TESTS:       DX:     ED Mgmt:   (ADM OVERRIDE)   1 Each &lt;See Task&gt; (08-13 @ 14:59)    (ADM OVERRIDE)   1 Each &lt;See Task&gt; (08-13 @ 16:44)    (ADM OVERRIDE)   1 Each &lt;See Task&gt; (08-13 @ 16:46)    (ADM OVERRIDE)   1 Each &lt;See Task&gt; (08-13 @ 18:42)    (Floorstock)   1 Each &lt;See Task&gt; (08-13 @ 22:51)    albuterol/ipratropium for Nebulization..   3 milliLiter(s) Nebulizer (08-13 @ 15:16)   3 milliLiter(s) Nebulizer (08-13 @ 15:00)   3 milliLiter(s) Nebulizer (08-13 @ 14:15)    calcium gluconate IVPB   100 mL/Hr IV Intermittent (08-13 @ 16:54)    dexAMETHasone  Injectable   6 milliGRAM(s) IV Push (08-13 @ 19:06)    dextrose 50% Injectable   50 milliLiter(s) IV Push (08-13 @ 22:58)    dextrose 50% Injectable   25 Gram(s) IV Push (08-13 @ 16:55)    insulin regular  human recombinant   10 Unit(s) IV Push (08-13 @ 22:53)    insulin regular  human recombinant.   10 Unit(s) IV Push (08-13 @ 16:55)    lactated ringers Bolus   1000 mL/Hr IV Bolus (08-13 @ 15:14)    sodium chloride 0.9%.   50 mL/Hr IV Continuous (08-13 @ 21:20)   50 mL/Hr IV Continuous (08-13 @ 20:48)    sodium zirconium cyclosilicate   10 Gram(s) Oral (08-13 @ 21:20)     86y  F with PMH of HTN, CVA with Rt residual deficit, HLD, COPD not on home O2, CHF presented to the ED for recent COVID diagnosis, worsening shortness of breath and diarrhea. Patient was given Paxclovid with outpatient but for the past 24 to 48 hours has had worsening weakness, diarrhea, cough and shortness of breath.  Patient at triage was satting in the low 80s and hypotensive.  Patient has no other complaints but is feeling otherwise weak.    ED vitals:   T(F): 97.9 (08-13 @ 13:45), Max: 97.9 (08-13 @ 13:45)  HR: 68 (08-13 @ 23:00) (59 - 69)  BP: 111/58 (08-13 @ 23:00) (89/52 - 111/58)  RR: 18 (08-13 @ 23:00) (16 - 18)  SpO2: 90% (08-13 @ 23:00) (90% - 93%)    ED workup:                         10.0   8.96  )-----------( 206      ( 13 Aug 2022 15:44 )             30.3       08-13    132<L>  |  106  |  59<H>  ----------------------------<  90  7.2<HH>   |  15<L>  |  2.8<H>    Ca    9.0      13 Aug 2022 21:00  TPro  6.3  /  Alb  4.0  /  TBili  <0.2  /  DBili  x   /  AST  11  /  ALT  9   /  AlkPhos  51  08-13  LIVER FUNCTIONS - ( 13 Aug 2022 15:44 )  Alb: 4.0 g/dL / Pro: 6.3 g/dL / ALK PHOS: 51 U/L / ALT: 9 U/L / AST: 11 U/L / GGT: x            CARDIAC MARKERS ( 13 Aug 2022 15:44 )  x     / <0.01 ng/mL / x     / x     / x          Patient being admitted for management of COVID, MARY and hyperkalemia.

## 2022-08-13 NOTE — ED ADULT NURSE NOTE - OBJECTIVE STATEMENT
pt c/o weakness and diarrhea with decreased po intake since testing positive on wend for Covid - pt is taking paxlovid since wend

## 2022-08-13 NOTE — H&P ADULT - NSHPLABSRESULTS_GEN_ALL_CORE
(08-13 @ 15:44)                      10.0  8.96 )-----------( 206                 30.3    Neutrophils = 7.76 (86.5%)  Lymphocytes = 0.51 (5.7%)  Eosinophils = 0.07 (0.8%)  Basophils = 0.01 (0.1%)  Monocytes = 0.56 (6.3%)  Bands = --%    08-13    132<L>  |  106  |  59<H>  ----------------------------<  90  7.2<HH>   |  15<L>  |  2.8<H>    Ca    9.0      13 Aug 2022 21:00    TPro  6.3  /  Alb  4.0  /  TBili  <0.2  /  DBili  x   /  AST  11  /  ALT  9   /  AlkPhos  51  08-13        CARDIAC MARKERS ( 13 Aug 2022 15:44 )  Trop <0.01 ng/mL / CK x     / CKMB x             RVP:    Venous Blood Gas:  08-13 @ 19:01  7.27/37/40/17/73.5  VBG Lactate: 1.20  Venous Blood Gas:  08-13 @ 15:53  7.30/33/49/16/84.9  VBG Lactate: 1.20        Tox: (08-13 @ 15:44)                      10.0  8.96 )-----------( 206                 30.3    Neutrophils = 7.76 (86.5%)  Lymphocytes = 0.51 (5.7%)  Eosinophils = 0.07 (0.8%)  Basophils = 0.01 (0.1%)  Monocytes = 0.56 (6.3%)  Bands = --%    08-13    132<L>  |  106  |  59<H>  ----------------------------<  90  7.2<HH>   |  15<L>  |  2.8<H>    Ca    9.0      13 Aug 2022 21:00    TPro  6.3  /  Alb  4.0  /  TBili  <0.2  /  DBili  x   /  AST  11  /  ALT  9   /  AlkPhos  51  08-13      CARDIAC MARKERS ( 13 Aug 2022 15:44 )  Trop <0.01 ng/mL / CK x     / CKMB x         Venous Blood Gas:  08-13 @ 19:01  7.27/37/40/17/73.5  VBG Lactate: 1.20  Venous Blood Gas:  08-13 @ 15:53  7.30/33/49/16/84.9  VBG Lactate: 1.20

## 2022-08-13 NOTE — ED PROVIDER NOTE - ATTENDING CONTRIBUTION TO CARE
86-year-old female past medical history hypertension, hyperlipidemia, CVA, COPD not on any home oxygen, CHF presents emerged department with worsening COVID-like symptoms.  Patient reports that she was diagnosed with COVID-19 on Wednesday after having cough and congestion and was started on Paxlovid at that time.  Patient reports having worsening sore throat, cough, shortness of breath, dehydration and decreased p.o. intake for the past few days. Pt also reporting mind intermittent non-bloody diarrhea and nbnb vomiting x2 days. Patient's daughter notes patient was hypotensive at home today 90s over 50s.  Daughter reports patient's baseline oxygen saturation is 85 to 88%.    No fever, chest pain, palpitations, diaphoresis, headache, dizziness, numbness/tingling, neck pain/stiffness, abdominal pain, diarrhea, constipation, melena/brbpr, urinary symptoms, trauma, edema, calf pain or swelling, sick contacts, recent travel or rash.     Vital Signs: I have reviewed the initial vital signs.  Constitutional: Mild tachypnea, sat 83% on RA, started on 2L O2.   Integumentary: No rash.  ENT: Dry MM  NECK: Supple.   Cardiovascular: RRR, radial pulses 2/4 bilaterally.   Respiratory: Breath sounds CTA b/l, no wheezing or crackles, good air exchange, good resp effort and excursion, no accessory muscle use, no stridor.  Gastrointestinal: Abdomen soft, non-tender, non-distended, no rebound tenderness or guarding, no CVAT.   Musculoskeletal: FROM, no edema, no calf pain/swelling/erythema.  Neurologic: AAOX3, motor 5/5 and sensation intact throughout upper and lower ext, CN II-XII intact, No facial droop or slurring of speech. No focal deficits.

## 2022-08-13 NOTE — H&P ADULT - NSHPPHYSICALEXAM_GEN_ALL_CORE
Vitals (Last 24 Hrs):  T(C): 36.6 (08-13-22 @ 13:45), Max: 36.6 (08-13-22 @ 13:45)  T(F): 97.9 (08-13-22 @ 13:45), Max: 97.9 (08-13-22 @ 13:45)  HR: 69 (08-13-22 @ 20:30) (59 - 69)  BP: 97/48 (08-13-22 @ 20:30) (89/52 - 97/48)  RR: 18 (08-13-22 @ 20:30) (16 - 18)  SpO2: 92% (08-13-22 @ 20:30) (91% - 93%) Vitals (Last 24 Hrs):  T(C): 36.6 (08-13-22 @ 13:45), Max: 36.6 (08-13-22 @ 13:45)  T(F): 97.9 (08-13-22 @ 13:45), Max: 97.9 (08-13-22 @ 13:45)  HR: 69 (08-13-22 @ 20:30) (59 - 69)  BP: 97/48 (08-13-22 @ 20:30) (89/52 - 97/48)  RR: 18 (08-13-22 @ 20:30) (16 - 18)  SpO2: 92% (08-13-22 @ 20:30) (91% - 93%)    General: No acute distress; Pallor (-), Icterus (-), Cyanosis (-), Clubbing (-)  HEENT: Normocephalic, atraumatic, PERRLA, EOMI  PULM: Bilaterally equal and clear breath sounds, wheeze (-), rubs (-), crackles (-)  CVS: Normal S1 and S2, murmurs (-), rubs (-), gallops (-)   GI: Soft, nondistended, nontender, BS +  MSK: Edema (-), no muscle, bone or joint tenderness noted  SKIN: Warm and well perfused, no rashes noted  NEURO:  Alert and Oriented x 3; Rt hemiparesis +

## 2022-08-13 NOTE — H&P ADULT - ATTENDING COMMENTS
HPI:  86y  F with PMH of HTN, CVA with Rt residual deficit, HLD, COPD not on home O2, CHF presented to the ED for recent COVID diagnosis, worsening shortness of breath and diarrhea. Patient was given Paxclovid with outpatient but for the past 24 to 48 hours has had worsening weakness, diarrhea, cough and shortness of breath.  Patient at triage was satting in the low 80s and hypotensive.  Patient has no other complaints but is feeling otherwise weak.    ED vitals:   T(F): 97.9 (08-13 @ 13:45), Max: 97.9 (08-13 @ 13:45)  HR: 68 (08-13 @ 23:00) (59 - 69)  BP: 111/58 (08-13 @ 23:00) (89/52 - 111/58)  RR: 18 (08-13 @ 23:00) (16 - 18)  SpO2: 90% (08-13 @ 23:00) (90% - 93%)    ED workup:                         10.0   8.96  )-----------( 206      ( 13 Aug 2022 15:44 )             30.3       08-13    132<L>  |  106  |  59<H>  ----------------------------<  90  7.2<HH>   |  15<L>  |  2.8<H>    Ca    9.0      13 Aug 2022 21:00  TPro  6.3  /  Alb  4.0  /  TBili  <0.2  /  DBili  x   /  AST  11  /  ALT  9   /  AlkPhos  51  08-13  LIVER FUNCTIONS - ( 13 Aug 2022 15:44 )  Alb: 4.0 g/dL / Pro: 6.3 g/dL / ALK PHOS: 51 U/L / ALT: 9 U/L / AST: 11 U/L / GGT: x            CARDIAC MARKERS ( 13 Aug 2022 15:44 )  x     / <0.01 ng/mL / x     / x     / x          Patient being admitted for management of COVID, MARY and hyperkalemia.  (13 Aug 2022 22:31)    REVIEW OF SYSTEMS: see cc/HPI   CONSTITUTIONAL: No weakness, fevers or chills  EYES/ENT: No visual changes;  No vertigo or throat pain   NECK: No pain or stiffness  RESPIRATORY: No cough, wheezing, hemoptysis; No shortness of breath  CARDIOVASCULAR: No chest pain or palpitations  GASTROINTESTINAL: No abdominal or epigastric pain. No nausea, vomiting, or hematemesis; No diarrhea or constipation. No melena or hematochezia.  GENITOURINARY: No dysuria, frequency or hematuria  NEUROLOGICAL: No numbness or weakness  SKIN: No itching, rashes    Physical Exam:   General: WN/WD NAD  Neurology: A&Ox3, nonfocal, follows commands  Eyes: PERRLA/ EOMI  ENT/Neck: Neck supple, trachea midline, No JVD  Respiratory: CTA B/L, No wheezing, rales, rhonchi  CV: Normal rate regular rhythm, S1S2, no murmurs, rubs or gallops  Abdominal: Soft, NT, ND +BS,   Extremities: No edema, + peripheral pulses  Skin: No Rashes, Hematoma, Ecchymosis  Incisions:   Tubes:    A/p  PATIENT seen by ATTENDING 8/13/22 ( note revised 8/14) HPI:  86y  F with PMH of HTN, CVA with Rt residual deficit, HLD, COPD not on home O2, CHF presented to the ED for recent COVID diagnosis, worsening shortness of breath and diarrhea. Patient was given Paxclovid with outpatient but for the past 24 to 48 hours has had worsening weakness, diarrhea, cough and shortness of breath.  Patient at triage was satting in the low 80s and hypotensive.  Patient has no other complaints but is feeling otherwise weak.    ED vitals:   T(F): 97.9 (08-13 @ 13:45), Max: 97.9 (08-13 @ 13:45)  HR: 68 (08-13 @ 23:00) (59 - 69)  BP: 111/58 (08-13 @ 23:00) (89/52 - 111/58)  RR: 18 (08-13 @ 23:00) (16 - 18)  SpO2: 90% (08-13 @ 23:00) (90% - 93%)    ED workup:                         10.0   8.96  )-----------( 206      ( 13 Aug 2022 15:44 )             30.3       08-13    132<L>  |  106  |  59<H>  ----------------------------<  90  7.2<HH>   |  15<L>  |  2.8<H>    Ca    9.0      13 Aug 2022 21:00  TPro  6.3  /  Alb  4.0  /  TBili  <0.2  /  DBili  x   /  AST  11  /  ALT  9   /  AlkPhos  51  08-13  LIVER FUNCTIONS - ( 13 Aug 2022 15:44 )  Alb: 4.0 g/dL / Pro: 6.3 g/dL / ALK PHOS: 51 U/L / ALT: 9 U/L / AST: 11 U/L / GGT: x            CARDIAC MARKERS ( 13 Aug 2022 15:44 )  x     / <0.01 ng/mL / x     / x     / x          Patient being admitted for management of COVID, MARY and hyperkalemia.  (13 Aug 2022 22:31)    REVIEW OF SYSTEMS: see cc/HPI   CONSTITUTIONAL: No weakness, fevers or chills  EYES/ENT: No visual changes;  No vertigo or throat pain   NECK: No pain or stiffness  RESPIRATORY: No cough, wheezing, hemoptysis; No shortness of breath  CARDIOVASCULAR: No chest pain or palpitations  GASTROINTESTINAL: No abdominal or epigastric pain. No nausea, vomiting, or hematemesis; No diarrhea or constipation. No melena or hematochezia.  GENITOURINARY: No dysuria, frequency or hematuria  NEUROLOGICAL: No numbness or weakness  SKIN: No itching, rashes    Physical Exam:   General: WN/WD NAD  Neurology: A&Ox3, nonfocal, follows commands  Eyes: PERRLA/ EOMI  ENT/Neck: Neck supple, trachea midline, No JVD  Respiratory: CTA B/L, No wheezing, rales, rhonchi  CV: Normal rate regular rhythm, S1S2, no murmurs, rubs or gallops  Abdominal: Soft, NT, ND +BS,   Extremities: No edema, + peripheral pulses  Skin: No Rashes, Hematoma, Ecchymosis  Incisions:   Tubes:    A/p  Acute respiratory failure w/ hypoxia 2/2 COVID-19   COPD exacerbation   -Pulse ox monitoring - daughter and patient refusing supplemental O2 despite being hypoxic to the 80's   -check inflammatory markers   -c/w Dexamethasone x 10  -ID eval   -Bronchodilators PRN     COVID-19 infection w/ above resp symptoms and diarrhea  -supportive care     MARY - most likely 2/2 to diarrhea and Hyperkalemia   -IV dextrose/Insulin / Lokelma   -IV hydration   -renal U/S and urine lytes/Cr   -agree w/ holding ACEI and spironolactone   -Renal eval     H/O CVA w/ R margaret  -c/w ASA     HTN   -hold in antihypertensives for now   -if persistently elevated can start hydralazine or BBlocker      DVT prophylaxis     PATIENT seen by ATTENDING 8/13/22 ( note revised 8/14)

## 2022-08-13 NOTE — ED PROVIDER NOTE - CARE PLAN
1 Principal Discharge DX:	2019 novel coronavirus disease (COVID-19)  Secondary Diagnosis:	Shortness of breath  Secondary Diagnosis:	MARY (acute kidney injury)  Secondary Diagnosis:	Hyperkalemia

## 2022-08-13 NOTE — H&P ADULT - NSHPSOCIALHISTORY_GEN_ALL_CORE
Substance Use (street drugs): ( x ) never used  (  ) other:  Tobacco Usage:  (  ) never smoked   ( x ) former smoker  - quit in 2018, >40 pack years (   ) current smoker  (     ) pack year  Alcohol Usage: None

## 2022-08-13 NOTE — ED PROVIDER NOTE - PHYSICAL EXAMINATION
CONSTITUTIONAL: Well-developed; well-nourished; in moderate acute distress.   SKIN: warm, dry  HEAD: Normocephalic; atraumatic.  EYES: PERRL, EOMI, normal sclera and conjunctiva   ENT: No nasal discharge; airway clear.  NECK: Supple; non tender.  CARD:  Regular rate and rhythm.   RESP: +inc WOB , wheezing  ABD: soft ntnd  EXT: Normal ROM.    LYMPH: No acute cervical adenopathy.  NEURO: Alert, oriented, grossly unremarkable  PSYCH: Cooperative, appropriate.

## 2022-08-13 NOTE — ED PROVIDER NOTE - CLINICAL SUMMARY MEDICAL DECISION MAKING FREE TEXT BOX
86-year-old female presenting with worsening COVID-19 symptoms.  Patient found to have hyperkalemia with a potassium of 7.3 on the CMP as well as an MARY with a creatinine of 2.9.  Hyperkalemia treated with insulin, albuterol, calcium.  EKG showing no acute changes. Nephrology consult placed.  Patient approved for stepdown unit by ICU team for continued care and monitoring. I have fully discussed the medical management and delivery of care with the patient. I have discussed any available labs, imaging and treatment options with the patient.  Pt admitted for further care & management.

## 2022-08-14 LAB
ALBUMIN SERPL ELPH-MCNC: 3.8 G/DL — SIGNIFICANT CHANGE UP (ref 3.5–5.2)
ALBUMIN SERPL ELPH-MCNC: 3.9 G/DL — SIGNIFICANT CHANGE UP (ref 3.5–5.2)
ALBUMIN SERPL ELPH-MCNC: 4 G/DL — SIGNIFICANT CHANGE UP (ref 3.5–5.2)
ALP SERPL-CCNC: 51 U/L — SIGNIFICANT CHANGE UP (ref 30–115)
ALP SERPL-CCNC: 52 U/L — SIGNIFICANT CHANGE UP (ref 30–115)
ALP SERPL-CCNC: 59 U/L — SIGNIFICANT CHANGE UP (ref 30–115)
ALT FLD-CCNC: 8 U/L — SIGNIFICANT CHANGE UP (ref 0–41)
ALT FLD-CCNC: 8 U/L — SIGNIFICANT CHANGE UP (ref 0–41)
ALT FLD-CCNC: 9 U/L — SIGNIFICANT CHANGE UP (ref 0–41)
ANION GAP SERPL CALC-SCNC: 12 MMOL/L — SIGNIFICANT CHANGE UP (ref 7–14)
ANION GAP SERPL CALC-SCNC: 12 MMOL/L — SIGNIFICANT CHANGE UP (ref 7–14)
ANION GAP SERPL CALC-SCNC: 13 MMOL/L — SIGNIFICANT CHANGE UP (ref 7–14)
ANION GAP SERPL CALC-SCNC: 13 MMOL/L — SIGNIFICANT CHANGE UP (ref 7–14)
AST SERPL-CCNC: 10 U/L — SIGNIFICANT CHANGE UP (ref 0–41)
AST SERPL-CCNC: 10 U/L — SIGNIFICANT CHANGE UP (ref 0–41)
AST SERPL-CCNC: 12 U/L — SIGNIFICANT CHANGE UP (ref 0–41)
BASOPHILS # BLD AUTO: 0 K/UL — SIGNIFICANT CHANGE UP (ref 0–0.2)
BASOPHILS NFR BLD AUTO: 0 % — SIGNIFICANT CHANGE UP (ref 0–1)
BILIRUB SERPL-MCNC: <0.2 MG/DL — SIGNIFICANT CHANGE UP (ref 0.2–1.2)
BUN SERPL-MCNC: 41 MG/DL — HIGH (ref 10–20)
BUN SERPL-MCNC: 44 MG/DL — HIGH (ref 10–20)
BUN SERPL-MCNC: 49 MG/DL — HIGH (ref 10–20)
BUN SERPL-MCNC: 55 MG/DL — HIGH (ref 10–20)
CALCIUM SERPL-MCNC: 10.2 MG/DL — HIGH (ref 8.5–10.1)
CALCIUM SERPL-MCNC: 8.3 MG/DL — LOW (ref 8.5–10.1)
CALCIUM SERPL-MCNC: 9.1 MG/DL — SIGNIFICANT CHANGE UP (ref 8.5–10.1)
CALCIUM SERPL-MCNC: 9.2 MG/DL — SIGNIFICANT CHANGE UP (ref 8.5–10.1)
CHLORIDE SERPL-SCNC: 108 MMOL/L — SIGNIFICANT CHANGE UP (ref 98–110)
CHLORIDE SERPL-SCNC: 109 MMOL/L — SIGNIFICANT CHANGE UP (ref 98–110)
CHLORIDE SERPL-SCNC: 110 MMOL/L — SIGNIFICANT CHANGE UP (ref 98–110)
CHLORIDE SERPL-SCNC: 111 MMOL/L — HIGH (ref 98–110)
CO2 SERPL-SCNC: 14 MMOL/L — LOW (ref 17–32)
CO2 SERPL-SCNC: 15 MMOL/L — LOW (ref 17–32)
CO2 SERPL-SCNC: 16 MMOL/L — LOW (ref 17–32)
CO2 SERPL-SCNC: 16 MMOL/L — LOW (ref 17–32)
CREAT SERPL-MCNC: 1.5 MG/DL — SIGNIFICANT CHANGE UP (ref 0.7–1.5)
CREAT SERPL-MCNC: 1.6 MG/DL — HIGH (ref 0.7–1.5)
CREAT SERPL-MCNC: 2 MG/DL — HIGH (ref 0.7–1.5)
CREAT SERPL-MCNC: 2.4 MG/DL — HIGH (ref 0.7–1.5)
EGFR: 19 ML/MIN/1.73M2 — LOW
EGFR: 24 ML/MIN/1.73M2 — LOW
EGFR: 31 ML/MIN/1.73M2 — LOW
EGFR: 34 ML/MIN/1.73M2 — LOW
EOSINOPHIL # BLD AUTO: 0 K/UL — SIGNIFICANT CHANGE UP (ref 0–0.7)
EOSINOPHIL NFR BLD AUTO: 0 % — SIGNIFICANT CHANGE UP (ref 0–8)
GLUCOSE BLDC GLUCOMTR-MCNC: 121 MG/DL — HIGH (ref 70–99)
GLUCOSE BLDC GLUCOMTR-MCNC: 181 MG/DL — HIGH (ref 70–99)
GLUCOSE SERPL-MCNC: 111 MG/DL — HIGH (ref 70–99)
GLUCOSE SERPL-MCNC: 115 MG/DL — HIGH (ref 70–99)
GLUCOSE SERPL-MCNC: 115 MG/DL — HIGH (ref 70–99)
GLUCOSE SERPL-MCNC: 217 MG/DL — HIGH (ref 70–99)
HCT VFR BLD CALC: 28.9 % — LOW (ref 37–47)
HGB BLD-MCNC: 9.7 G/DL — LOW (ref 12–16)
IMM GRANULOCYTES NFR BLD AUTO: 0.8 % — HIGH (ref 0.1–0.3)
LYMPHOCYTES # BLD AUTO: 0.5 K/UL — LOW (ref 1.2–3.4)
LYMPHOCYTES # BLD AUTO: 8 % — LOW (ref 20.5–51.1)
MAGNESIUM SERPL-MCNC: 1.9 MG/DL — SIGNIFICANT CHANGE UP (ref 1.8–2.4)
MCHC RBC-ENTMCNC: 28.6 PG — SIGNIFICANT CHANGE UP (ref 27–31)
MCHC RBC-ENTMCNC: 33.6 G/DL — SIGNIFICANT CHANGE UP (ref 32–37)
MCV RBC AUTO: 85.3 FL — SIGNIFICANT CHANGE UP (ref 81–99)
MONOCYTES # BLD AUTO: 0.11 K/UL — SIGNIFICANT CHANGE UP (ref 0.1–0.6)
MONOCYTES NFR BLD AUTO: 1.8 % — SIGNIFICANT CHANGE UP (ref 1.7–9.3)
NEUTROPHILS # BLD AUTO: 5.57 K/UL — SIGNIFICANT CHANGE UP (ref 1.4–6.5)
NEUTROPHILS NFR BLD AUTO: 89.4 % — HIGH (ref 42.2–75.2)
NRBC # BLD: 0 /100 WBCS — SIGNIFICANT CHANGE UP (ref 0–0)
NT-PROBNP SERPL-SCNC: 3553 PG/ML — HIGH (ref 0–300)
PLATELET # BLD AUTO: 196 K/UL — SIGNIFICANT CHANGE UP (ref 130–400)
POTASSIUM SERPL-MCNC: 5.7 MMOL/L — HIGH (ref 3.5–5)
POTASSIUM SERPL-MCNC: 6.2 MMOL/L — CRITICAL HIGH (ref 3.5–5)
POTASSIUM SERPL-MCNC: 6.5 MMOL/L — CRITICAL HIGH (ref 3.5–5)
POTASSIUM SERPL-MCNC: 6.6 MMOL/L — CRITICAL HIGH (ref 3.5–5)
POTASSIUM SERPL-SCNC: 5.7 MMOL/L — HIGH (ref 3.5–5)
POTASSIUM SERPL-SCNC: 6.2 MMOL/L — CRITICAL HIGH (ref 3.5–5)
POTASSIUM SERPL-SCNC: 6.5 MMOL/L — CRITICAL HIGH (ref 3.5–5)
POTASSIUM SERPL-SCNC: 6.6 MMOL/L — CRITICAL HIGH (ref 3.5–5)
PROT SERPL-MCNC: 6.3 G/DL — SIGNIFICANT CHANGE UP (ref 6–8)
PROT SERPL-MCNC: 6.4 G/DL — SIGNIFICANT CHANGE UP (ref 6–8)
PROT SERPL-MCNC: 6.5 G/DL — SIGNIFICANT CHANGE UP (ref 6–8)
RBC # BLD: 3.39 M/UL — LOW (ref 4.2–5.4)
RBC # FLD: 14.6 % — HIGH (ref 11.5–14.5)
SODIUM SERPL-SCNC: 137 MMOL/L — SIGNIFICANT CHANGE UP (ref 135–146)
SODIUM SERPL-SCNC: 138 MMOL/L — SIGNIFICANT CHANGE UP (ref 135–146)
WBC # BLD: 6.23 K/UL — SIGNIFICANT CHANGE UP (ref 4.8–10.8)
WBC # FLD AUTO: 6.23 K/UL — SIGNIFICANT CHANGE UP (ref 4.8–10.8)

## 2022-08-14 PROCEDURE — 93010 ELECTROCARDIOGRAM REPORT: CPT | Mod: 77

## 2022-08-14 PROCEDURE — 71045 X-RAY EXAM CHEST 1 VIEW: CPT | Mod: 26

## 2022-08-14 PROCEDURE — 99233 SBSQ HOSP IP/OBS HIGH 50: CPT

## 2022-08-14 PROCEDURE — 93010 ELECTROCARDIOGRAM REPORT: CPT

## 2022-08-14 PROCEDURE — 76770 US EXAM ABDO BACK WALL COMP: CPT | Mod: 26

## 2022-08-14 PROCEDURE — 99223 1ST HOSP IP/OBS HIGH 75: CPT

## 2022-08-14 RX ORDER — CALCIUM GLUCONATE 100 MG/ML
2 VIAL (ML) INTRAVENOUS ONCE
Refills: 0 | Status: COMPLETED | OUTPATIENT
Start: 2022-08-14 | End: 2022-08-14

## 2022-08-14 RX ORDER — SODIUM ZIRCONIUM CYCLOSILICATE 10 G/10G
5 POWDER, FOR SUSPENSION ORAL EVERY 8 HOURS
Refills: 0 | Status: DISCONTINUED | OUTPATIENT
Start: 2022-08-14 | End: 2022-08-16

## 2022-08-14 RX ORDER — IPRATROPIUM/ALBUTEROL SULFATE 18-103MCG
3 AEROSOL WITH ADAPTER (GRAM) INHALATION EVERY 6 HOURS
Refills: 0 | Status: DISCONTINUED | OUTPATIENT
Start: 2022-08-14 | End: 2022-08-17

## 2022-08-14 RX ORDER — DEXTROSE 50 % IN WATER 50 %
50 SYRINGE (ML) INTRAVENOUS ONCE
Refills: 0 | Status: DISCONTINUED | OUTPATIENT
Start: 2022-08-14 | End: 2022-08-14

## 2022-08-14 RX ORDER — INSULIN HUMAN 100 [IU]/ML
10 INJECTION, SOLUTION SUBCUTANEOUS ONCE
Refills: 0 | Status: COMPLETED | OUTPATIENT
Start: 2022-08-14 | End: 2022-08-14

## 2022-08-14 RX ORDER — SODIUM ZIRCONIUM CYCLOSILICATE 10 G/10G
10 POWDER, FOR SUSPENSION ORAL ONCE
Refills: 0 | Status: COMPLETED | OUTPATIENT
Start: 2022-08-14 | End: 2022-08-14

## 2022-08-14 RX ORDER — SODIUM CHLORIDE 9 MG/ML
1000 INJECTION, SOLUTION INTRAVENOUS
Refills: 0 | Status: DISCONTINUED | OUTPATIENT
Start: 2022-08-14 | End: 2022-08-15

## 2022-08-14 RX ORDER — DEXTROSE 10 % IN WATER 10 %
250 INTRAVENOUS SOLUTION INTRAVENOUS
Refills: 0 | Status: DISCONTINUED | OUTPATIENT
Start: 2022-08-14 | End: 2022-08-15

## 2022-08-14 RX ORDER — LABETALOL HCL 100 MG
200 TABLET ORAL
Refills: 0 | Status: DISCONTINUED | OUTPATIENT
Start: 2022-08-14 | End: 2022-08-17

## 2022-08-14 RX ORDER — DEXTROSE 50 % IN WATER 50 %
50 SYRINGE (ML) INTRAVENOUS ONCE
Refills: 0 | Status: COMPLETED | OUTPATIENT
Start: 2022-08-14 | End: 2022-08-14

## 2022-08-14 RX ADMIN — Medication 1000 UNIT(S): at 14:09

## 2022-08-14 RX ADMIN — INSULIN HUMAN 10 UNIT(S): 100 INJECTION, SOLUTION SUBCUTANEOUS at 16:12

## 2022-08-14 RX ADMIN — INSULIN HUMAN 10 UNIT(S): 100 INJECTION, SOLUTION SUBCUTANEOUS at 09:01

## 2022-08-14 RX ADMIN — Medication 100 GRAM(S): at 01:39

## 2022-08-14 RX ADMIN — Medication 81 MILLIGRAM(S): at 11:42

## 2022-08-14 RX ADMIN — SERTRALINE 100 MILLIGRAM(S): 25 TABLET, FILM COATED ORAL at 11:41

## 2022-08-14 RX ADMIN — HEPARIN SODIUM 5000 UNIT(S): 5000 INJECTION INTRAVENOUS; SUBCUTANEOUS at 08:44

## 2022-08-14 RX ADMIN — Medication 50 MILLILITER(S): at 16:11

## 2022-08-14 RX ADMIN — SODIUM ZIRCONIUM CYCLOSILICATE 5 GRAM(S): 10 POWDER, FOR SUSPENSION ORAL at 18:13

## 2022-08-14 RX ADMIN — SODIUM CHLORIDE 50 MILLILITER(S): 9 INJECTION INTRAMUSCULAR; INTRAVENOUS; SUBCUTANEOUS at 01:39

## 2022-08-14 RX ADMIN — SODIUM ZIRCONIUM CYCLOSILICATE 10 GRAM(S): 10 POWDER, FOR SUSPENSION ORAL at 16:11

## 2022-08-14 RX ADMIN — SODIUM ZIRCONIUM CYCLOSILICATE 5 GRAM(S): 10 POWDER, FOR SUSPENSION ORAL at 21:59

## 2022-08-14 RX ADMIN — SODIUM ZIRCONIUM CYCLOSILICATE 5 GRAM(S): 10 POWDER, FOR SUSPENSION ORAL at 11:41

## 2022-08-14 RX ADMIN — Medication 200 GRAM(S): at 16:12

## 2022-08-14 RX ADMIN — Medication 500 MILLILITER(S): at 08:42

## 2022-08-14 RX ADMIN — SODIUM CHLORIDE 75 MILLILITER(S): 9 INJECTION, SOLUTION INTRAVENOUS at 18:13

## 2022-08-14 NOTE — PROGRESS NOTE ADULT - ASSESSMENT
86y  F with PMH of HTN, CVA with Rt residual deficit, HLD, COPD not on home O2, CHF presented to the ED for recent COVID diagnosis, worsening shortness of breath and diarrhea. Patient was given Paxclovid with outpatient but for the past 24 to 48 hours has had worsening weakness, diarrhea, cough and shortness of breath.  Patient at triage was satting in the low 80s and hypotensive.  Patient has no other complaints but is feeling otherwise weak.    A/P;   Diarrhea:   Possibly secondary to Paxlovid.   Improved today, send GI PCR, abdomen is benign.     Acute Kidney Injury with Hyperkalemia;   Normal anion gap metabolic Acidosis: possibly from Acute Kidney Injury, diarrhea.   Likely pre-renal from poor oral intake  Hold Enalapril and Aldactone.   Send UA, urine Na, Cr and urine osmol.   Continue Iv fluid D5 and NaHCO3. Manage hyperkalemia with Dextrose and Insulin, Lokelma 10mg BID for today.     COVID-19 infection:   patient is vaccinated with booster, first infection.   She was on Paxlovid outpatient, held due to diarrhea.   No hypoxia, she has borderline low SpO2 from COPD.  CXR showed no acute infiltrates.   ID consult is pending.     COPD, advanced.   Lungs are clear, no evidence of wheezing   CXR is clear.   Continue  Patient noted with desat to 84% with movement, per daughter she is mostly bedbound, thus not interested with home O2.       CVA with mild right side weakness   RUE 5/5, RLE 4/5     HTN   BP was low, Nifedipine 30mg, Labetalol 200mg on hold.      Pending: improving Acute Kidney Injury, hyperkalemia.   Possibly discharge in 24-48 hrs after improving hyperkalemia and Acute Kidney Injury.

## 2022-08-14 NOTE — ED ADULT NURSE REASSESSMENT NOTE - NS ED NURSE REASSESS COMMENT FT1
Assumed care from previous nurse c/o weakness , diarrhea , COVID + ,on isolation status, pt alert ,awake , follows commands , IVL intact , no respiratory distress noted , no grimaced face noted , no diarrhea noted as of this time, fall alarm on , daughter at the bedside

## 2022-08-14 NOTE — CONSULT NOTE ADULT - SUBJECTIVE AND OBJECTIVE BOX
NEPHROLOGY CONSULTATION NOTE    86y  F with PMH of HTN, CVA with Rt residual deficit, HLD, COPD not on home O2, CHF presented to the ED for recent COVID diagnosis, worsening shortness of breath and diarrhea. Patient was given Paxclovid with outpatient but for the past 24 to 48 hours has had worsening weakness, diarrhea, cough and shortness of breath.      MARY    PAST MEDICAL & SURGICAL HISTORY:  HLD (hyperlipidemia)      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      No significant past surgical history        Allergies:  Celebrex (Other (Moderate))  sulfonamides (Unknown)    Home Medications Reviewed  Hospital Medications:   MEDICATIONS  (STANDING):  aspirin  chewable 81 milliGRAM(s) Oral daily  cholecalciferol 1000 Unit(s) Oral daily  dexAMETHasone  Injectable 6 milliGRAM(s) IV Push daily  dextrose 10%. 250 milliLiter(s) (500 mL/Hr) IV Continuous <Continuous>  dextrose 5% 1000 milliLiter(s) (75 mL/Hr) IV Continuous <Continuous>  heparin   Injectable 5000 Unit(s) SubCutaneous every 12 hours  sertraline 100 milliGRAM(s) Oral daily  sodium zirconium cyclosilicate 5 Gram(s) Oral every 8 hours      SOCIAL HISTORY:  Denies ETOH,Smoking,   FAMILY HISTORY:  FHx: stroke  in father          REVIEW OF SYSTEMS:  CONSTITUTIONAL: No weakness, fevers or chills  EYES/ENT: No visual changes;  No vertigo or throat pain   NECK: No pain or stiffness  RESPIRATORY: No cough, wheezing, hemoptysis; No shortness of breath  CARDIOVASCULAR: No chest pain or palpitations.  GASTROINTESTINAL: No abdominal or epigastric pain. No nausea, vomiting, or hematemesis; No diarrhea or constipation. No melena or hematochezia.  GENITOURINARY: No dysuria, frequency, foamy urine, urinary urgency, incontinence or hematuria  NEUROLOGICAL: No numbness or weakness  SKIN: No itching, burning, rashes, or lesions   VASCULAR: No bilateral lower extremity edema.   All other review of systems is negative unless indicated above.    VITALS:  T(F): 97.2 (08-14-22 @ 15:27), Max: 98.3 (08-14-22 @ 07:56)  HR: 95 (08-14-22 @ 15:27)  BP: 138/65 (08-14-22 @ 15:27)  RR: 18 (08-14-22 @ 15:27)  SpO2: 95% (08-14-22 @ 15:27)        I&O's Detail        PHYSICAL EXAM:    General: No acute distress; Pallor (-), Icterus (-), Cyanosis (-), Clubbing (-)  HEENT: Normocephalic, atraumatic, PERRLA, EOMI  PULM: Bilaterally equal and clear breath sounds, wheeze (-), rubs (-), crackles (-)  CVS: Normal S1 and S2, murmurs (-), rubs (-), gallops (-)   GI: Soft, nondistended, nontender, BS +  MSK: Edema (-), no muscle, bone or joint tenderness noted  SKIN: Warm and well perfused, no rashes noted  NEURO:  Alert and Oriented x 3; Rt hemiparesis +    LABS:  08-14    137  |  108  |  41<H>  ----------------------------<  217<H>  6.5<HH>   |  16<L>  |  1.6<H>    Ca    9.1      14 Aug 2022 11:33  Mg     1.9     08-14    TPro  6.4  /  Alb  4.0  /  TBili  <0.2  /  DBili      /  AST  10  /  ALT  8   /  AlkPhos  52  08-14    Creatinine Trend: 1.6 <--, 2.0 <--, 2.4 <--, 2.8 <--, 2.9 <--                        9.7    6.23  )-----------( 196      ( 14 Aug 2022 06:52 )             28.9     Urine Studies:              RADIOLOGY & ADDITIONAL STUDIES:

## 2022-08-14 NOTE — CONSULT NOTE ADULT - ASSESSMENT
ASSESSMENT  86y  F with PMH of HTN, CVA with Rt residual deficit, HLD, COPD not on home O2, CHF presented to the ED for recent COVID diagnosis, worsening shortness of breath and diarrhea    IMPRESSION  #COVID- severe  #Hypotension  #COPD  #CHF    #Obesity BMI (kg/m2): 20.4  #Abx allergy: Celebrex (Other (Moderate))    RECOMMENDATIONS  This is a preliminary incomplete pended note, all final recommendations to follow after interview and examination of the patient.    Please call or message on [employer] Teams if with any questions.  Spectra [phone]     ASSESSMENT  86y  F with PMH of HTN, CVA with Rt residual deficit, HLD, COPD not on home O2, CHF presented to the ED for recent COVID diagnosis, worsening shortness of breath and diarrhea    IMPRESSION  #COVID- severe  #Hypotension  #Diarrhea  #COPD  #MARY  #Hyperkalemia  #CHF    #Obesity BMI (kg/m2): 20.4  #Abx allergy: Celebrex (Other (Moderate))    RECOMMENDATIONS  - start  mg x1, followed by 100 mg daily   - plan for 5 days, but if with clinical improvement does not have to complete course   - on dex 6 mg daily   - monitor O2     Please call or message on [employer] Teams if with any questions.  Spectra [phone]

## 2022-08-14 NOTE — CONSULT NOTE ADULT - SUBJECTIVE AND OBJECTIVE BOX
EMILIANO ANAYA  86y, Female  Allergy: Celebrex (Other (Moderate))  sulfonamides (Unknown)      CHIEF COMPLAINT: COVID, diarrhea, vomiting, weakness (14 Aug 2022 07:45)      LOS  1d    HPI:  86y  F with PMH of HTN, CVA with Rt residual deficit, HLD, COPD not on home O2, CHF presented to the ED for recent COVID diagnosis, worsening shortness of breath and diarrhea. Patient was given Paxclovid with outpatient but for the past 24 to 48 hours has had worsening weakness, diarrhea, cough and shortness of breath.  Patient at triage was satting in the low 80s and hypotensive.  Patient has no other complaints but is feeling otherwise weak.    INFECTIOUS DISEASE HISTORY:  History as above.   Presents with hypoxia and hypotension.   Diagnosed with COVID on Wednesday 8/10.   Took paxlovid.     PAST MEDICAL & SURGICAL HISTORY:  HLD (hyperlipidemia)      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      No significant past surgical history          FAMILY HISTORY  No pertinent family history in first degree relatives    FHx: stroke        SOCIAL HISTORY  Social History:  Substance Use (street drugs): ( x ) never used  (  ) other:  Tobacco Usage:  (  ) never smoked   ( x ) former smoker  - quit in 2018, >40 pack years (   ) current smoker  (     ) pack year  Alcohol Usage: None (13 Aug 2022 22:31)        ROS  General: Denies rigors, nightsweats  HEENT: Denies headache, rhinorrhea, sore throat, eye pain  CV: Denies CP, palpitations  PULM: Denies wheezing, hemoptysis  GI: Denies hematemesis, hematochezia, melena  : Denies discharge, hematuria  MSK: Denies arthralgias, myalgias  SKIN: Denies rash, lesions  NEURO: Denies paresthesias, weakness  PSYCH: Denies depression, anxiety    VITALS:  T(F): 98.3, Max: 98.3 (08-14-22 @ 07:56)  HR: 100  BP: 145/66  RR: 18Vital Signs Last 24 Hrs  T(C): 36.8 (14 Aug 2022 07:56), Max: 36.8 (14 Aug 2022 07:56)  T(F): 98.3 (14 Aug 2022 07:56), Max: 98.3 (14 Aug 2022 07:56)  HR: 100 (14 Aug 2022 07:56) (59 - 100)  BP: 145/66 (14 Aug 2022 07:56) (89/52 - 145/66)  BP(mean): --  RR: 18 (14 Aug 2022 07:56) (16 - 18)  SpO2: 95% (14 Aug 2022 07:56) (90% - 95%)    Parameters below as of 14 Aug 2022 07:56  Patient On (Oxygen Delivery Method): room air        PHYSICAL EXAM:  Gen: NAD, resting in bed  HEENT: Normocephalic, atraumatic  Neck: supple, no lymphadenopathy  CV: Regular rate & regular rhythm  Lungs: decreased BS at bases, no fremitus  Abdomen: Soft, BS present  Ext: Warm, well perfused  Neuro: non focal, awake  Skin: no rash, no erythema  Lines: no phlebitis    TESTS & MEASUREMENTS:                        9.7    6.23  )-----------( 196      ( 14 Aug 2022 06:52 )             28.9     08-14    137  |  110  |  49<H>  ----------------------------<  115<H>  6.6<HH>   |  14<L>  |  2.0<H>    Ca    9.2      14 Aug 2022 06:52  Mg     1.9     08-14    TPro  6.3  /  Alb  3.8  /  TBili  <0.2  /  DBili  x   /  AST  10  /  ALT  8   /  AlkPhos  51  08-14      LIVER FUNCTIONS - ( 14 Aug 2022 06:52 )  Alb: 3.8 g/dL / Pro: 6.3 g/dL / ALK PHOS: 51 U/L / ALT: 8 U/L / AST: 10 U/L / GGT: x                 Blood Gas Venous - Lactate: 1.20 mmol/L (08-13-22 @ 19:01)  Blood Gas Venous - Lactate: 1.20 mmol/L (08-13-22 @ 15:53)      INFECTIOUS DISEASES TESTING  COVID-19 PCR: Detected (08-13-22 @ 20:40)      RADIOLOGY & ADDITIONAL TESTS:  I have personally reviewed the last Chest xray  CXR      CT      CARDIOLOGY TESTING  12 Lead ECG:   Ventricular Rate 78 BPM    Atrial Rate 78 BPM    P-R Interval 172 ms    QRS Duration 82 ms    Q-T Interval 376 ms    QTC Calculation(Bazett) 428 ms    P Axis 78 degrees    R Axis 42 degrees    T Axis 66 degrees    Diagnosis Line Normal sinus rhythm  Nonspecific ST abnormality  Abnormal ECG    Confirmed by Mihai Allison ([id number]) on 8/14/2022 11:14:42 AM (08-14-22 @ 01:14)  12 Lead ECG:   Ventricular Rate 64 BPM    Atrial Rate 64 BPM    P-R Interval 172 ms    QRS Duration 82 ms    Q-T Interval 366 ms    QTC Calculation(Bazett) 377 ms    P Axis 76 degrees    R Axis 54 degrees    T Axis 59 degrees    Diagnosis Line Normal sinus rhythm  Normal ECG    Confirmed by Mihai Allison ([id number]) on 8/13/2022 8:05:40 PM (08-13-22 @ 16:51)      MEDICATIONS  aspirin  chewable 81 Oral daily  cholecalciferol 1000 Oral daily  dexAMETHasone  Injectable 6 IV Push daily  dextrose 10%. 250 IV Continuous <Continuous>  dextrose 5% 1000 IV Continuous <Continuous>  heparin   Injectable 5000 SubCutaneous every 12 hours  sertraline 100 Oral daily  sodium zirconium cyclosilicate 5 Oral every 8 hours      Weight  Weight (kg): 59 (08-13-22 @ 13:45)    ANTIBIOTICS:      ALLERGIES:  Celebrex (Other (Moderate))  sulfonamides (Unknown)       EMILIANO ANAYA  86y, Female  Allergy: Celebrex (Other (Moderate))  sulfonamides (Unknown)      CHIEF COMPLAINT: COVID, diarrhea, vomiting, weakness (14 Aug 2022 07:45)      LOS  1d    HPI:  86y  F with PMH of HTN, CVA with Rt residual deficit, HLD, COPD not on home O2, CHF presented to the ED for recent COVID diagnosis, worsening shortness of breath and diarrhea. Patient was given Paxclovid with outpatient but for the past 24 to 48 hours has had worsening weakness, diarrhea, cough and shortness of breath.  Patient at triage was satting in the low 80s and hypotensive.  Patient has no other complaints but is feeling otherwise weak.    INFECTIOUS DISEASE HISTORY:  History as above.   Presents with hypoxia and hypotension.   Diagnosed with COVID on Wednesday 8/10.   Took paxlovid but took only 4 pills.   Reports diarrhea started after taking Paxlovid. Denies diarrhea prior.   No recent antibiotic use.   Reports nausea.     PAST MEDICAL & SURGICAL HISTORY:  HLD (hyperlipidemia)      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      No significant past surgical history          FAMILY HISTORY  No pertinent family history in first degree relatives    FHx: stroke        SOCIAL HISTORY  Social History:  Substance Use (street drugs): ( x ) never used  (  ) other:  Tobacco Usage:  (  ) never smoked   ( x ) former smoker  - quit in 2018, >40 pack years (   ) current smoker  (     ) pack year  Alcohol Usage: None (13 Aug 2022 22:31)        ROS  General: Denies rigors, nightsweats  HEENT: Denies headache, rhinorrhea, sore throat, eye pain  CV: Denies CP, palpitations  PULM: Denies wheezing, hemoptysis  GI: Denies hematemesis, hematochezia, melena  : Denies discharge, hematuria  MSK: Denies arthralgias, myalgias  SKIN: Denies rash, lesions  NEURO: Denies paresthesias, weakness  PSYCH: Denies depression, anxiety    VITALS:  T(F): 98.3, Max: 98.3 (08-14-22 @ 07:56)  HR: 100  BP: 145/66  RR: 18Vital Signs Last 24 Hrs  T(C): 36.8 (14 Aug 2022 07:56), Max: 36.8 (14 Aug 2022 07:56)  T(F): 98.3 (14 Aug 2022 07:56), Max: 98.3 (14 Aug 2022 07:56)  HR: 100 (14 Aug 2022 07:56) (59 - 100)  BP: 145/66 (14 Aug 2022 07:56) (89/52 - 145/66)  BP(mean): --  RR: 18 (14 Aug 2022 07:56) (16 - 18)  SpO2: 95% (14 Aug 2022 07:56) (90% - 95%)    Parameters below as of 14 Aug 2022 07:56  Patient On (Oxygen Delivery Method): room air        PHYSICAL EXAM:  Gen: NAD, resting in bed  HEENT: Normocephalic, atraumatic  Neck: supple, no lymphadenopathy  CV: Regular rate & regular rhythm  Lungs: decreased BS at bases, no fremitus  Abdomen: Soft, BS present  Ext: Warm, well perfused  Neuro: non focal, awake  Skin: no rash, no erythema  Lines: no phlebitis    TESTS & MEASUREMENTS:                        9.7    6.23  )-----------( 196      ( 14 Aug 2022 06:52 )             28.9     08-14    137  |  110  |  49<H>  ----------------------------<  115<H>  6.6<HH>   |  14<L>  |  2.0<H>    Ca    9.2      14 Aug 2022 06:52  Mg     1.9     08-14    TPro  6.3  /  Alb  3.8  /  TBili  <0.2  /  DBili  x   /  AST  10  /  ALT  8   /  AlkPhos  51  08-14      LIVER FUNCTIONS - ( 14 Aug 2022 06:52 )  Alb: 3.8 g/dL / Pro: 6.3 g/dL / ALK PHOS: 51 U/L / ALT: 8 U/L / AST: 10 U/L / GGT: x                 Blood Gas Venous - Lactate: 1.20 mmol/L (08-13-22 @ 19:01)  Blood Gas Venous - Lactate: 1.20 mmol/L (08-13-22 @ 15:53)      INFECTIOUS DISEASES TESTING  COVID-19 PCR: Detected (08-13-22 @ 20:40)      RADIOLOGY & ADDITIONAL TESTS:  I have personally reviewed the last Chest xray  CXR      CT      CARDIOLOGY TESTING  12 Lead ECG:   Ventricular Rate 78 BPM    Atrial Rate 78 BPM    P-R Interval 172 ms    QRS Duration 82 ms    Q-T Interval 376 ms    QTC Calculation(Bazett) 428 ms    P Axis 78 degrees    R Axis 42 degrees    T Axis 66 degrees    Diagnosis Line Normal sinus rhythm  Nonspecific ST abnormality  Abnormal ECG    Confirmed by Mihai Allison ([id number]) on 8/14/2022 11:14:42 AM (08-14-22 @ 01:14)  12 Lead ECG:   Ventricular Rate 64 BPM    Atrial Rate 64 BPM    P-R Interval 172 ms    QRS Duration 82 ms    Q-T Interval 366 ms    QTC Calculation(Bazett) 377 ms    P Axis 76 degrees    R Axis 54 degrees    T Axis 59 degrees    Diagnosis Line Normal sinus rhythm  Normal ECG    Confirmed by Mihai Allison ([id number]) on 8/13/2022 8:05:40 PM (08-13-22 @ 16:51)      MEDICATIONS  aspirin  chewable 81 Oral daily  cholecalciferol 1000 Oral daily  dexAMETHasone  Injectable 6 IV Push daily  dextrose 10%. 250 IV Continuous <Continuous>  dextrose 5% 1000 IV Continuous <Continuous>  heparin   Injectable 5000 SubCutaneous every 12 hours  sertraline 100 Oral daily  sodium zirconium cyclosilicate 5 Oral every 8 hours      Weight  Weight (kg): 59 (08-13-22 @ 13:45)    ANTIBIOTICS:      ALLERGIES:  Celebrex (Other (Moderate))  sulfonamides (Unknown)

## 2022-08-14 NOTE — CONSULT NOTE ADULT - REASON FOR ADMISSION
COVID, diarrhea, vomiting, weakness

## 2022-08-14 NOTE — PROGRESS NOTE ADULT - SUBJECTIVE AND OBJECTIVE BOX
EMILIANO ANAYA  86y  Female      Patient is a 86y old  Female who presents with a chief complaint of COVID, diarrhea, vomiting, weakness (14 Aug 2022 11:40)      INTERVAL HPI/OVERNIGHT EVENTS:  She feels better, no diarrhea still with cough.   Vital Signs Last 24 Hrs  T(C): 36.8 (14 Aug 2022 07:56), Max: 36.8 (14 Aug 2022 07:56)  T(F): 98.3 (14 Aug 2022 07:56), Max: 98.3 (14 Aug 2022 07:56)  HR: 100 (14 Aug 2022 07:56) (60 - 100)  BP: 145/66 (14 Aug 2022 07:56) (91/55 - 145/66)  BP(mean): --  RR: 18 (14 Aug 2022 07:56) (18 - 18)  SpO2: 95% (14 Aug 2022 07:56) (90% - 95%)    Parameters below as of 14 Aug 2022 07:56  Patient On (Oxygen Delivery Method): room air                Consultant(s) Notes Reviewed:  [x ] YES  [ ] NO          MEDICATIONS  (STANDING):  aspirin  chewable 81 milliGRAM(s) Oral daily  calcium gluconate IVPB 2 Gram(s) IV Intermittent once  cholecalciferol 1000 Unit(s) Oral daily  dexAMETHasone  Injectable 6 milliGRAM(s) IV Push daily  dextrose 10%. 250 milliLiter(s) (500 mL/Hr) IV Continuous <Continuous>  dextrose 5% 1000 milliLiter(s) (75 mL/Hr) IV Continuous <Continuous>  dextrose 50% Injectable 50 milliLiter(s) IV Push once  heparin   Injectable 5000 Unit(s) SubCutaneous every 12 hours  insulin regular  human recombinant. 10 Unit(s) IV Push once  sertraline 100 milliGRAM(s) Oral daily  sodium zirconium cyclosilicate 5 Gram(s) Oral every 8 hours  sodium zirconium cyclosilicate 10 Gram(s) Oral once    MEDICATIONS  (PRN):  acetaminophen     Tablet .. 650 milliGRAM(s) Oral every 4 hours PRN Temp greater or equal to 38.5C (101.3F)  albuterol/ipratropium for Nebulization 3 milliLiter(s) Nebulizer every 6 hours PRN Shortness of Breath and/or Wheezing  benzonatate 100 milliGRAM(s) Oral three times a day PRN Cough      LABS                          9.7    6.23  )-----------( 196      ( 14 Aug 2022 06:52 )             28.9     08-14    137  |  108  |  41<H>  ----------------------------<  217<H>  6.5<HH>   |  16<L>  |  1.6<H>    Ca    9.1      14 Aug 2022 11:33  Mg     1.9     08-14    TPro  6.4  /  Alb  4.0  /  TBili  <0.2  /  DBili  x   /  AST  10  /  ALT  8   /  AlkPhos  52  08-14          Lactate Trend    CARDIAC MARKERS ( 13 Aug 2022 15:44 )  x     / <0.01 ng/mL / x     / x     / x          CAPILLARY BLOOD GLUCOSE      POCT Blood Glucose.: 121 mg/dL (14 Aug 2022 08:56)        RADIOLOGY & ADDITIONAL TESTS:    Imaging Personally Reviewed:  [ ] YES  [ ] NO    HEALTH ISSUES - PROBLEM Dx:          PHYSICAL EXAM:  GENERAL: NAD, well-developed.  HEAD:  Atraumatic, Normocephalic.  EYES: EOMI, PERRLA, conjunctiva and sclera clear.  NECK: Supple, No JVD.  CHEST/LUNG: Clear to auscultation bilaterally; No wheeze.  HEART: Regular rate and rhythm; S1 S2.   ABDOMEN: Soft, Nontender, Nondistended; Bowel sounds present.  EXTREMITIES:  2+ Peripheral Pulses, No clubbing, cyanosis, or edema.  PSYCH: AAOx3.  NEUROLOGY: non-focal.  SKIN: No rashes or lesions.

## 2022-08-14 NOTE — ED ADULT NURSE REASSESSMENT NOTE - NS ED NURSE REASSESS COMMENT FT1
elevated /95 ,  , called and informed admitting MD , awaiting for orders , pt denies headache , denies dizziness , denies chest pain , no syncopal episode , denies any pain , no SOB

## 2022-08-14 NOTE — CONSULT NOTE ADULT - ASSESSMENT
86y  F with PMH of HTN, CVA with Rt residual deficit, HLD, COPD not on home O2, CHF presented to the ED for recent COVID diagnosis, worsening shortness of breath and diarrhea    MARY due to prerenal from diarrhea/ATN due to COVID  marked hyperK     - Cotn IVF, cr imrpvoing  - Hyper K persists increase lokelma  - aldactone and enalapril being held   - needs glycemic control  (is on D5)   - check UA  - keep MAP > 60  - monitor UOP    will follow

## 2022-08-14 NOTE — CONSULT NOTE ADULT - SUBJECTIVE AND OBJECTIVE BOX
Patient is a 86y old  Female who presents with a chief complaint of COVID, diarrhea, vomiting, weakness (13 Aug 2022 22:31)      HPI:  86y  F with PMH of HTN, CVA with Rt residual deficit, HLD, COPD not on home O2, CHF presented to the ED for recent COVID diagnosis, worsening shortness of breath and diarrhea. Patient was given Paxclovid with outpatient but for the past 24 to 48 hours has had worsening weakness, diarrhea, cough and shortness of breath.  Patient at triage was satting in the low 80s and hypotensive.  Patient has no other complaints but is feeling otherwise weak.    ED vitals:   T(F): 97.9 (08-13 @ 13:45), Max: 97.9 (08-13 @ 13:45)  HR: 68 (08-13 @ 23:00) (59 - 69)  BP: 111/58 (08-13 @ 23:00) (89/52 - 111/58)  RR: 18 (08-13 @ 23:00) (16 - 18)  SpO2: 90% (08-13 @ 23:00) (90% - 93%)    ED workup:                         10.0   8.96  )-----------( 206      ( 13 Aug 2022 15:44 )             30.3       08-13    132<L>  |  106  |  59<H>  ----------------------------<  90  7.2<HH>   |  15<L>  |  2.8<H>    Ca    9.0      13 Aug 2022 21:00  TPro  6.3  /  Alb  4.0  /  TBili  <0.2  /  DBili  x   /  AST  11  /  ALT  9   /  AlkPhos  51  08-13  LIVER FUNCTIONS - ( 13 Aug 2022 15:44 )  Alb: 4.0 g/dL / Pro: 6.3 g/dL / ALK PHOS: 51 U/L / ALT: 9 U/L / AST: 11 U/L / GGT: x            CARDIAC MARKERS ( 13 Aug 2022 15:44 )  x     / <0.01 ng/mL / x     / x     / x          Patient being admitted for management of COVID, MARY and hyperkalemia.  (13 Aug 2022 22:31)      PAST MEDICAL & SURGICAL HISTORY:  HLD (hyperlipidemia)      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      No significant past surgical history          SOCIAL HX:   Smoking   former                       ETOH  occ                            Other    FAMILY HISTORY:  FHx: stroke  in father    :  No known cardiovacular family hisotry     ROS:  See HPI     Allergies    Celebrex (Other (Moderate))  sulfonamides (Unknown)    Intolerances          PHYSICAL EXAM    ICU Vital Signs Last 24 Hrs  T(C): 36.6 (13 Aug 2022 13:45), Max: 36.6 (13 Aug 2022 13:45)  T(F): 97.9 (13 Aug 2022 13:45), Max: 97.9 (13 Aug 2022 13:45)  HR: 68 (13 Aug 2022 23:00) (59 - 69)  BP: 111/58 (13 Aug 2022 23:00) (89/52 - 111/58)  BP(mean): --  ABP: --  ABP(mean): --  RR: 18 (13 Aug 2022 23:00) (16 - 18)  SpO2: 90% (13 Aug 2022 23:00) (90% - 93%)    O2 Parameters below as of 13 Aug 2022 23:00  Patient On (Oxygen Delivery Method): room air            General: In NAD, on room air when i saw her this morning  HEENT:  VANDANA              Lymphatic system: No cervical LN   Lungs: Bilateral BS, clear anteriorly   Cardiovascular: Regular  Gastrointestinal: Soft, Positive BS  Musculoskeletal: No clubbing.  Moves all extremities.  Full range of motion   Skin: Warm.  Intact  Neurological: No motor or sensory deficit         LABS:                          10.0   8.96  )-----------( 206      ( 13 Aug 2022 15:44 )             30.3                                               08-14    138  |  111<H>  |  55<H>  ----------------------------<  115<H>  6.2<HH>   |  15<L>  |  2.4<H>    Ca    8.3<L>      14 Aug 2022 00:55    TPro  6.3  /  Alb  4.0  /  TBili  <0.2  /  DBili  x   /  AST  11  /  ALT  9   /  AlkPhos  51  08-13                                                 CARDIAC MARKERS ( 13 Aug 2022 15:44 )  x     / <0.01 ng/mL / x     / x     / x                                                LIVER FUNCTIONS - ( 13 Aug 2022 15:44 )  Alb: 4.0 g/dL / Pro: 6.3 g/dL / ALK PHOS: 51 U/L / ALT: 9 U/L / AST: 11 U/L / GGT: x                                                                                                                                       X-Rays  Increased markings; no consolidations                                                                                   ECHO    MEDICATIONS  (STANDING):  aspirin  chewable 81 milliGRAM(s) Oral daily  cholecalciferol 1000 Unit(s) Oral daily  dexAMETHasone  Injectable 6 milliGRAM(s) IV Push daily  dextrose 10%. 250 milliLiter(s) (500 mL/Hr) IV Continuous <Continuous>  heparin   Injectable 5000 Unit(s) SubCutaneous every 12 hours  insulin regular  human recombinant 10 Unit(s) IV Push once  sertraline 100 milliGRAM(s) Oral daily  sodium chloride 0.9%. 1000 milliLiter(s) (50 mL/Hr) IV Continuous <Continuous>  sodium zirconium cyclosilicate 10 Gram(s) Oral daily    MEDICATIONS  (PRN):  acetaminophen     Tablet .. 650 milliGRAM(s) Oral every 4 hours PRN Temp greater or equal to 38.5C (101.3F)  albuterol/ipratropium for Nebulization 3 milliLiter(s) Nebulizer every 6 hours PRN Shortness of Breath and/or Wheezing  benzonatate 100 milliGRAM(s) Oral three times a day PRN Cough

## 2022-08-15 LAB
ALBUMIN SERPL ELPH-MCNC: 3.6 G/DL — SIGNIFICANT CHANGE UP (ref 3.5–5.2)
ALBUMIN SERPL ELPH-MCNC: 3.8 G/DL — SIGNIFICANT CHANGE UP (ref 3.5–5.2)
ALP SERPL-CCNC: 46 U/L — SIGNIFICANT CHANGE UP (ref 30–115)
ALP SERPL-CCNC: 49 U/L — SIGNIFICANT CHANGE UP (ref 30–115)
ALT FLD-CCNC: 8 U/L — SIGNIFICANT CHANGE UP (ref 0–41)
ALT FLD-CCNC: 8 U/L — SIGNIFICANT CHANGE UP (ref 0–41)
ANION GAP SERPL CALC-SCNC: 12 MMOL/L — SIGNIFICANT CHANGE UP (ref 7–14)
AST SERPL-CCNC: 11 U/L — SIGNIFICANT CHANGE UP (ref 0–41)
AST SERPL-CCNC: 11 U/L — SIGNIFICANT CHANGE UP (ref 0–41)
BASOPHILS # BLD AUTO: 0.01 K/UL — SIGNIFICANT CHANGE UP (ref 0–0.2)
BASOPHILS NFR BLD AUTO: 0.1 % — SIGNIFICANT CHANGE UP (ref 0–1)
BILIRUB DIRECT SERPL-MCNC: <0.2 MG/DL — SIGNIFICANT CHANGE UP (ref 0–0.3)
BILIRUB INDIRECT FLD-MCNC: SIGNIFICANT CHANGE UP MG/DL (ref 0.2–1.2)
BILIRUB SERPL-MCNC: <0.2 MG/DL — SIGNIFICANT CHANGE UP (ref 0.2–1.2)
BILIRUB SERPL-MCNC: <0.2 MG/DL — SIGNIFICANT CHANGE UP (ref 0.2–1.2)
BUN SERPL-MCNC: 40 MG/DL — HIGH (ref 10–20)
CALCIUM SERPL-MCNC: 9.3 MG/DL — SIGNIFICANT CHANGE UP (ref 8.5–10.1)
CHLORIDE SERPL-SCNC: 106 MMOL/L — SIGNIFICANT CHANGE UP (ref 98–110)
CO2 SERPL-SCNC: 20 MMOL/L — SIGNIFICANT CHANGE UP (ref 17–32)
CREAT SERPL-MCNC: 1.3 MG/DL — SIGNIFICANT CHANGE UP (ref 0.7–1.5)
CREAT SERPL-MCNC: 1.3 MG/DL — SIGNIFICANT CHANGE UP (ref 0.7–1.5)
EGFR: 40 ML/MIN/1.73M2 — LOW
EGFR: 40 ML/MIN/1.73M2 — LOW
EOSINOPHIL # BLD AUTO: 0.09 K/UL — SIGNIFICANT CHANGE UP (ref 0–0.7)
EOSINOPHIL NFR BLD AUTO: 1 % — SIGNIFICANT CHANGE UP (ref 0–8)
FERRITIN SERPL-MCNC: 158 NG/ML — HIGH (ref 15–150)
GLUCOSE SERPL-MCNC: 124 MG/DL — HIGH (ref 70–99)
HCT VFR BLD CALC: 30.3 % — LOW (ref 37–47)
HGB BLD-MCNC: 9.9 G/DL — LOW (ref 12–16)
IMM GRANULOCYTES NFR BLD AUTO: 0.6 % — HIGH (ref 0.1–0.3)
INR BLD: 0.96 RATIO — SIGNIFICANT CHANGE UP (ref 0.65–1.3)
LYMPHOCYTES # BLD AUTO: 0.68 K/UL — LOW (ref 1.2–3.4)
LYMPHOCYTES # BLD AUTO: 7.6 % — LOW (ref 20.5–51.1)
MAGNESIUM SERPL-MCNC: 1.9 MG/DL — SIGNIFICANT CHANGE UP (ref 1.8–2.4)
MCHC RBC-ENTMCNC: 27.3 PG — SIGNIFICANT CHANGE UP (ref 27–31)
MCHC RBC-ENTMCNC: 32.7 G/DL — SIGNIFICANT CHANGE UP (ref 32–37)
MCV RBC AUTO: 83.5 FL — SIGNIFICANT CHANGE UP (ref 81–99)
MONOCYTES # BLD AUTO: 0.69 K/UL — HIGH (ref 0.1–0.6)
MONOCYTES NFR BLD AUTO: 7.7 % — SIGNIFICANT CHANGE UP (ref 1.7–9.3)
NEUTROPHILS # BLD AUTO: 7.41 K/UL — HIGH (ref 1.4–6.5)
NEUTROPHILS NFR BLD AUTO: 83 % — HIGH (ref 42.2–75.2)
NRBC # BLD: 0 /100 WBCS — SIGNIFICANT CHANGE UP (ref 0–0)
PLATELET # BLD AUTO: 243 K/UL — SIGNIFICANT CHANGE UP (ref 130–400)
POTASSIUM SERPL-MCNC: 5.5 MMOL/L — HIGH (ref 3.5–5)
POTASSIUM SERPL-SCNC: 5.5 MMOL/L — HIGH (ref 3.5–5)
PROCALCITONIN SERPL-MCNC: 0.22 NG/ML — HIGH (ref 0.02–0.1)
PROT SERPL-MCNC: 5.8 G/DL — LOW (ref 6–8)
PROT SERPL-MCNC: 6.1 G/DL — SIGNIFICANT CHANGE UP (ref 6–8)
PROTHROM AB SERPL-ACNC: 11.1 SEC — SIGNIFICANT CHANGE UP (ref 9.95–12.87)
RBC # BLD: 3.63 M/UL — LOW (ref 4.2–5.4)
RBC # FLD: 14.6 % — HIGH (ref 11.5–14.5)
SODIUM SERPL-SCNC: 138 MMOL/L — SIGNIFICANT CHANGE UP (ref 135–146)
WBC # BLD: 8.93 K/UL — SIGNIFICANT CHANGE UP (ref 4.8–10.8)
WBC # FLD AUTO: 8.93 K/UL — SIGNIFICANT CHANGE UP (ref 4.8–10.8)

## 2022-08-15 PROCEDURE — 99233 SBSQ HOSP IP/OBS HIGH 50: CPT

## 2022-08-15 PROCEDURE — 71045 X-RAY EXAM CHEST 1 VIEW: CPT | Mod: 26

## 2022-08-15 RX ORDER — REMDESIVIR 5 MG/ML
100 INJECTION INTRAVENOUS EVERY 24 HOURS
Refills: 0 | Status: DISCONTINUED | OUTPATIENT
Start: 2022-08-16 | End: 2022-08-17

## 2022-08-15 RX ORDER — SODIUM CHLORIDE 9 MG/ML
1000 INJECTION INTRAMUSCULAR; INTRAVENOUS; SUBCUTANEOUS
Refills: 0 | Status: DISCONTINUED | OUTPATIENT
Start: 2022-08-15 | End: 2022-08-15

## 2022-08-15 RX ORDER — REMDESIVIR 5 MG/ML
200 INJECTION INTRAVENOUS EVERY 24 HOURS
Refills: 0 | Status: COMPLETED | OUTPATIENT
Start: 2022-08-15 | End: 2022-08-15

## 2022-08-15 RX ORDER — NIFEDIPINE 30 MG
90 TABLET, EXTENDED RELEASE 24 HR ORAL DAILY
Refills: 0 | Status: DISCONTINUED | OUTPATIENT
Start: 2022-08-16 | End: 2022-08-17

## 2022-08-15 RX ORDER — SODIUM CHLORIDE 9 MG/ML
1000 INJECTION, SOLUTION INTRAVENOUS
Refills: 0 | Status: DISCONTINUED | OUTPATIENT
Start: 2022-08-15 | End: 2022-08-17

## 2022-08-15 RX ORDER — REMDESIVIR 5 MG/ML
INJECTION INTRAVENOUS
Refills: 0 | Status: DISCONTINUED | OUTPATIENT
Start: 2022-08-15 | End: 2022-08-17

## 2022-08-15 RX ORDER — AMLODIPINE BESYLATE 2.5 MG/1
5 TABLET ORAL DAILY
Refills: 0 | Status: DISCONTINUED | OUTPATIENT
Start: 2022-08-15 | End: 2022-08-15

## 2022-08-15 RX ADMIN — Medication 200 MILLIGRAM(S): at 05:59

## 2022-08-15 RX ADMIN — SODIUM ZIRCONIUM CYCLOSILICATE 5 GRAM(S): 10 POWDER, FOR SUSPENSION ORAL at 15:00

## 2022-08-15 RX ADMIN — SODIUM CHLORIDE 75 MILLILITER(S): 9 INJECTION, SOLUTION INTRAVENOUS at 17:05

## 2022-08-15 RX ADMIN — REMDESIVIR 200 MILLIGRAM(S): 5 INJECTION INTRAVENOUS at 15:01

## 2022-08-15 RX ADMIN — AMLODIPINE BESYLATE 5 MILLIGRAM(S): 2.5 TABLET ORAL at 18:05

## 2022-08-15 RX ADMIN — SERTRALINE 100 MILLIGRAM(S): 25 TABLET, FILM COATED ORAL at 12:25

## 2022-08-15 RX ADMIN — SODIUM ZIRCONIUM CYCLOSILICATE 5 GRAM(S): 10 POWDER, FOR SUSPENSION ORAL at 06:00

## 2022-08-15 RX ADMIN — Medication 1000 UNIT(S): at 12:25

## 2022-08-15 RX ADMIN — Medication 81 MILLIGRAM(S): at 12:25

## 2022-08-15 RX ADMIN — Medication 6 MILLIGRAM(S): at 06:00

## 2022-08-15 NOTE — PROGRESS NOTE ADULT - ASSESSMENT
IMPRESSION:    Acute kidney injury - likely pre renal improving  Severe hyperkalemia  improving  Normal anion gap metabolic acidosis  Diarrhea  COVID-19 viral infection on RA      PLAN:    CNS: No depressants. Mental status is at baseline. Continue ASA for history of stroke.    HEENT: Oral care    PULMONARY:  HOB @ 45 degrees.  Keep O2 saturation more than 92%. DC decadron    CARDIOVASCULAR: 1/2 NS add 1 1/2 bicarb at 75 cc/ h x 24h, start amlodipine    GI: GI prophylaxis.  Feeding PO diet as tolerated.     RENAL: trend CMP, lokelma    INFECTIOUS DISEASE: Follow up cultures: trend markers, ID fup    HEMATOLOGICAL:  DVT prophylaxis: subcutaneous heparin. keep hgb more than 7.    ENDOCRINE:  Follow up FS.  Insulin protocol if needed.    MUSCULOSKELETAL: out of bed as tolerated.     SDU

## 2022-08-15 NOTE — ED ADULT NURSE REASSESSMENT NOTE - NS ED NURSE REASSESS COMMENT FT1
Patient A&Ox4, sitting up in wheel chair, daughter at bedside, NAD noted at this time, No SOB,  oxygen 99%, IVF running. No complaints at this time.

## 2022-08-15 NOTE — PROGRESS NOTE ADULT - ASSESSMENT
86y  F with PMH of HTN, CVA with Rt residual deficit, HLD, COPD not on home O2, CHF presented to the ED for recent COVID diagnosis, worsening shortness of breath and diarrhea    MARY due to prerenal from diarrhea/ATN due to COVID  marked hyperK     - creat better   - continue IVF if poor po intake   - cont lokelm and DC once K < 4.5   - aldactone and enalapril being held / keep on hold   - needs glycemic control  (is on D5)   - check UA  - keep MAP > 60  - monitor UOP    will sign off recall PRN / call using TEAMS or on [phone]

## 2022-08-15 NOTE — PROGRESS NOTE ADULT - ASSESSMENT
86y  F with PMH of HTN, CVA with Rt residual deficit, HLD, COPD not on home O2, CHF presented to the ED for recent COVID diagnosis, worsening shortness of breath and diarrhea. Patient was given Paxclovid with outpatient but for the past 24 to 48 hours has had worsening weakness, diarrhea, cough and shortness of breath.  Patient at triage was satting in the low 80s and hypotensive.  Patient has no other complaints but is feeling otherwise weak.    # Diarrhea likely reaction to paxlovid vs. due to COVID-19 viral illness  - GI PCR if diarrhea     # COVID-19 infection  - cannot tolerate paxlovid   - c/w remdisivir and dexamethasone    # MARY, resolving  # Hyperkalemia  - c/w IVF, monitor BMP  - on lokelma  - spironolactone, enalapril on hold    # COPD, advanced  - c/w oxygen supplementation  - need assessment for home oxygen upon discharge     # CVA with mild right side weakness   - c/w aspirn    # HTN, elevated  - c/w labetalol, resume nifedipine in AM  - spironolactone, enalapril on hold for MARY    # DVT prophylaxis  - on heparin subcut 86y  F with PMH of HTN, CVA with Rt residual deficit, HLD, COPD not on home O2, CHF presented to the ED for recent COVID diagnosis, worsening shortness of breath and diarrhea. Patient was given Paxclovid with outpatient but for the past 24 to 48 hours has had worsening weakness, diarrhea, cough and shortness of breath.  Patient at triage was satting in the low 80s and hypotensive.  Patient has no other complaints but is feeling otherwise weak.    # Diarrhea likely reaction to paxlovid vs. due to COVID-19 viral illness  - GI PCR if diarrhea     # COVID-19 infection  - cannot tolerate paxlovid   - c/w remdisivir  - dexamethasone discontinued     # MARY, resolving  # Hyperkalemia  - c/w IVF, monitor BMP  - on lokelma, D/C if K<4.5  - spironolactone, enalapril on hold    # COPD, advanced  - c/w oxygen supplementation as needed  - need assessment for home oxygen upon discharge     # CVA with mild right side weakness   - c/w aspirin    # HTN, elevated  - c/w labetalol, resume nifedipine in AM  - spironolactone, enalapril on hold for MARY    # DVT prophylaxis  - on heparin subcut

## 2022-08-15 NOTE — PROGRESS NOTE ADULT - SUBJECTIVE AND OBJECTIVE BOX
EMILIANO ANAYA  86y Female    Patient is a 86y old  Female who presents with a chief complaint of diarrhea    INTERVAL HPI/OVERNIGHT EVENTS:    ROS: Pt is seen and examined at bedside. Pt admits to mild cough but denies diarrhea since yesterday afternoon. Pt denies fever, chills, chest pain, SOB, palpitations, nausea, vomiting, abdominal pain, dysuria, rash, muscle or joint pain.    Overnight events: No acute events overnight.    Vital Signs Last 24 Hrs  T(C): 36.8 (15 Aug 2022 16:16), Max: 36.9 (14 Aug 2022 21:30)  T(F): 98.3 (15 Aug 2022 16:16), Max: 98.4 (14 Aug 2022 21:30)  HR: 55 (15 Aug 2022 18:00) (55 - 100)  BP: 180/82 (15 Aug 2022 18:00) (141/88 - 180/82)  BP(mean): 102 (14 Aug 2022 21:30) (102 - 102)  RR: 18 (15 Aug 2022 16:16) (18 - 18)  SpO2: 97% (15 Aug 2022 16:16) (95% - 97%)    Parameters below as of 15 Aug 2022 16:16  Patient On (Oxygen Delivery Method): room air        Celebrex (Other (Moderate))  sulfonamides (Unknown)      MEDICATIONS  (STANDING):  amLODIPine   Tablet 5 milliGRAM(s) Oral daily  aspirin  chewable 81 milliGRAM(s) Oral daily  cholecalciferol 1000 Unit(s) Oral daily  dexAMETHasone  Injectable 6 milliGRAM(s) IV Push daily  heparin   Injectable 5000 Unit(s) SubCutaneous every 12 hours  labetalol 200 milliGRAM(s) Oral two times a day  remdesivir  IVPB   IV Intermittent   sertraline 100 milliGRAM(s) Oral daily  sodium chloride 0.45% 1000 milliLiter(s) (75 mL/Hr) IV Continuous <Continuous>  sodium zirconium cyclosilicate 5 Gram(s) Oral every 8 hours    MEDICATIONS  (PRN):  acetaminophen     Tablet .. 650 milliGRAM(s) Oral every 4 hours PRN Temp greater or equal to 38.5C (101.3F)  albuterol/ipratropium for Nebulization 3 milliLiter(s) Nebulizer every 6 hours PRN Shortness of Breath and/or Wheezing  benzonatate 100 milliGRAM(s) Oral three times a day PRN Cough      PHYSICAL EXAM:  General Appearance: NAD, normal for age and gender. 	  Neck: Normal JVP, no bruit.   Eyes: Conjunctiva clear, Extra Ocular muscles intact. No scleral icterus.  ENMT: Moist oral mucosa. No oral lesion.  Cardiovascular: Regular rate and rhythm S1 S2, No JVD, systolic murmur.  Respiratory: Decreased air entry bilaterally. No wheezes, rales or rhonchi.  Psychiatry: Alert and oriented x 3, Mood & affect appropriate.  Gastrointestinal:  Soft, Non-tender, Non-distended.  Neurologic: Right sided weakness from prior CVA.  Musculoskeletal/ extremities: No joint swelling. No clubbing, cyanosis or edema.  Vascular: Peripheral pulses palpable bilaterally.  Skin/Integumen: No rashes, No ecchymoses, No cyanosis.      LABS:                        9.9    8.93  )-----------( 243      ( 15 Aug 2022 06:40 )             30.3     08-15    x   |  x   |  x   ----------------------------<  x   x    |  x   |  1.3    Ca    9.3      15 Aug 2022 06:40  Mg     1.9     08-15    TPro  5.8<L>  /  Alb  3.6  /  TBili  <0.2  /  DBili  <0.2  /  AST  11  /  ALT  8   /  AlkPhos  46  08-15    PT/INR - ( 15 Aug 2022 11:30 )   PT: 11.10 sec;   INR: 0.96 ratio               RADIOLOGY & ADDITIONAL TESTS:

## 2022-08-15 NOTE — PROGRESS NOTE ADULT - SUBJECTIVE AND OBJECTIVE BOX
Over Night Events: events noted, on RA, afebrile, ID reviewed    PHYSICAL EXAM    ICU Vital Signs Last 24 Hrs  T(C): 36.8 (15 Aug 2022 05:00), Max: 36.9 (14 Aug 2022 18:33)  T(F): 98.3 (15 Aug 2022 05:00), Max: 98.4 (14 Aug 2022 18:33)  HR: 81 (15 Aug 2022 05:00) (60 - 100)  BP: 178/77 (15 Aug 2022 05:00) (138/65 - 178/77)  BP(mean): 102 (14 Aug 2022 21:30) (102 - 102)  RR: 18 (15 Aug 2022 05:00) (18 - 18)  SpO2: 95% (15 Aug 2022 05:00) (94% - 97%)    O2 Parameters below as of 15 Aug 2022 05:00  Patient On (Oxygen Delivery Method): room air            General: Ill looking  Lungs: dec bs both bases  Cardiovascular: Regular   Abdomen: Soft, Positive BS  Extremities: No clubbing   Neurological: Non focal         LABS:                          9.7    6.23  )-----------( 196      ( 14 Aug 2022 06:52 )             28.9                                               08-14    137  |  109  |  44<H>  ----------------------------<  111<H>  5.7<H>   |  16<L>  |  1.5    Ca    10.2<H>      14 Aug 2022 17:45  Mg     1.9     08-14    TPro  6.5  /  Alb  3.9  /  TBili  <0.2  /  DBili  x   /  AST  12  /  ALT  9   /  AlkPhos  59  08-14                                                 CARDIAC MARKERS ( 13 Aug 2022 15:44 )  x     / <0.01 ng/mL / x     / x     / x                                                LIVER FUNCTIONS - ( 14 Aug 2022 17:45 )  Alb: 3.9 g/dL / Pro: 6.5 g/dL / ALK PHOS: 59 U/L / ALT: 9 U/L / AST: 12 U/L / GGT: x                                                                                                                                       MEDICATIONS  (STANDING):  aspirin  chewable 81 milliGRAM(s) Oral daily  cholecalciferol 1000 Unit(s) Oral daily  dexAMETHasone  Injectable 6 milliGRAM(s) IV Push daily  dextrose 10%. 250 milliLiter(s) (500 mL/Hr) IV Continuous <Continuous>  dextrose 5% 1000 milliLiter(s) (75 mL/Hr) IV Continuous <Continuous>  heparin   Injectable 5000 Unit(s) SubCutaneous every 12 hours  labetalol 200 milliGRAM(s) Oral two times a day  sertraline 100 milliGRAM(s) Oral daily  sodium zirconium cyclosilicate 5 Gram(s) Oral every 8 hours    MEDICATIONS  (PRN):  acetaminophen     Tablet .. 650 milliGRAM(s) Oral every 4 hours PRN Temp greater or equal to 38.5C (101.3F)  albuterol/ipratropium for Nebulization 3 milliLiter(s) Nebulizer every 6 hours PRN Shortness of Breath and/or Wheezing  benzonatate 100 milliGRAM(s) Oral three times a day PRN Cough        CXR reviewed

## 2022-08-15 NOTE — PROGRESS NOTE ADULT - SUBJECTIVE AND OBJECTIVE BOX
Nephrology progress note    THIS IS AN INCOMPLETE NOTE . FULL NOTE TO FOLLOW SHORTLY    Patient is seen and examined, events over the last 24 h noted .    Allergies:  Celebrex (Other (Moderate))  sulfonamides (Unknown)    Hospital Medications:   MEDICATIONS  (STANDING):  aspirin  chewable 81 milliGRAM(s) Oral daily  cholecalciferol 1000 Unit(s) Oral daily  dexAMETHasone  Injectable 6 milliGRAM(s) IV Push daily  dextrose 10%. 250 milliLiter(s) (500 mL/Hr) IV Continuous <Continuous>  dextrose 5% 1000 milliLiter(s) (75 mL/Hr) IV Continuous <Continuous>  heparin   Injectable 5000 Unit(s) SubCutaneous every 12 hours  labetalol 200 milliGRAM(s) Oral two times a day  sertraline 100 milliGRAM(s) Oral daily  sodium zirconium cyclosilicate 5 Gram(s) Oral every 8 hours        VITALS:  T(F): 98.3 (08-15-22 @ 05:00), Max: 98.4 (08-14-22 @ 18:33)  HR: 67 (08-15-22 @ 08:17)  BP: 147/68 (08-15-22 @ 08:17)  RR: 18 (08-15-22 @ 08:17)  SpO2: 95% (08-15-22 @ 08:17)  Wt(kg): --        PHYSICAL EXAM:  Constitutional: NAD  HEENT: anicteric sclera, oropharynx clear, MMM  Neck: No JVD  Respiratory: CTAB, no wheezes, rales or rhonchi  Cardiovascular: S1, S2, RRR  Gastrointestinal: BS+, soft, NT/ND  Extremities: No cyanosis or clubbing. No peripheral edema  :  No gonzales.   Skin: No rashes    LABS:  08-15    138  |  106  |  40<H>  ----------------------------<  124<H>  5.5<H>   |  20  |  1.3    Ca    9.3      15 Aug 2022 06:40  Mg     1.9     08-15    TPro  6.1  /  Alb  3.8  /  TBili  <0.2  /  DBili      /  AST  11  /  ALT  8   /  AlkPhos  49  08-15                          9.9    8.93  )-----------( 243      ( 15 Aug 2022 06:40 )             30.3       Urine Studies:                RADIOLOGY & ADDITIONAL STUDIES:   Nephrology progress note    Patient is seen and examined, events over the last 24 h noted .  case discussed with daughter at bedside     Allergies:  Celebrex (Other (Moderate))  sulfonamides (Unknown)    Hospital Medications:   MEDICATIONS  (STANDING):  aspirin  chewable 81 milliGRAM(s) Oral daily  cholecalciferol 1000 Unit(s) Oral daily  dexAMETHasone  Injectable 6 milliGRAM(s) IV Push daily  dextrose 10%. 250 milliLiter(s) (500 mL/Hr) IV Continuous <Continuous>  dextrose 5% 1000 milliLiter(s) (75 mL/Hr) IV Continuous <Continuous>  heparin   Injectable 5000 Unit(s) SubCutaneous every 12 hours  labetalol 200 milliGRAM(s) Oral two times a day  sertraline 100 milliGRAM(s) Oral daily  sodium zirconium cyclosilicate 5 Gram(s) Oral every 8 hours        VITALS:  T(F): 98.3 (08-15-22 @ 05:00), Max: 98.4 (08-14-22 @ 18:33)  HR: 67 (08-15-22 @ 08:17)  BP: 147/68 (08-15-22 @ 08:17)  RR: 18 (08-15-22 @ 08:17)  SpO2: 95% (08-15-22 @ 08:17)          PHYSICAL EXAM:  Constitutional: NAD  Neck: No JVD  Respiratory: CTAB,   Cardiovascular: S1, S2, RRR  Gastrointestinal: BS+, soft, NT/ND  Extremities: No cyanosis or clubbing. No peripheral edema  :  No gonzales.   Skin: No rashes    LABS:  08-15    138  |  106  |  40<H>  ----------------------------<  124<H>  5.5<H>   |  20  |  1.3    Ca    9.3      15 Aug 2022 06:40  Mg     1.9     08-15    TPro  6.1  /  Alb  3.8  /  TBili  <0.2  /  DBili      /  AST  11  /  ALT  8   /  AlkPhos  49  08-15                          9.9    8.93  )-----------( 243      ( 15 Aug 2022 06:40 )             30.3       Urine Studies:                RADIOLOGY & ADDITIONAL STUDIES:

## 2022-08-16 LAB
ALBUMIN SERPL ELPH-MCNC: 3.5 G/DL — SIGNIFICANT CHANGE UP (ref 3.5–5.2)
ALBUMIN SERPL ELPH-MCNC: 3.6 G/DL — SIGNIFICANT CHANGE UP (ref 3.5–5.2)
ALP SERPL-CCNC: 43 U/L — SIGNIFICANT CHANGE UP (ref 30–115)
ALP SERPL-CCNC: 43 U/L — SIGNIFICANT CHANGE UP (ref 30–115)
ALT FLD-CCNC: 8 U/L — SIGNIFICANT CHANGE UP (ref 0–41)
ALT FLD-CCNC: 8 U/L — SIGNIFICANT CHANGE UP (ref 0–41)
ANION GAP SERPL CALC-SCNC: 12 MMOL/L — SIGNIFICANT CHANGE UP (ref 7–14)
APPEARANCE UR: CLEAR — SIGNIFICANT CHANGE UP
AST SERPL-CCNC: 10 U/L — SIGNIFICANT CHANGE UP (ref 0–41)
AST SERPL-CCNC: 11 U/L — SIGNIFICANT CHANGE UP (ref 0–41)
BACTERIA # UR AUTO: NEGATIVE — SIGNIFICANT CHANGE UP
BASOPHILS # BLD AUTO: 0.01 K/UL — SIGNIFICANT CHANGE UP (ref 0–0.2)
BASOPHILS NFR BLD AUTO: 0.1 % — SIGNIFICANT CHANGE UP (ref 0–1)
BILIRUB DIRECT SERPL-MCNC: <0.2 MG/DL — SIGNIFICANT CHANGE UP (ref 0–0.3)
BILIRUB INDIRECT FLD-MCNC: SIGNIFICANT CHANGE UP MG/DL (ref 0.2–1.2)
BILIRUB SERPL-MCNC: <0.2 MG/DL — SIGNIFICANT CHANGE UP (ref 0.2–1.2)
BILIRUB SERPL-MCNC: <0.2 MG/DL — SIGNIFICANT CHANGE UP (ref 0.2–1.2)
BILIRUB UR-MCNC: NEGATIVE — SIGNIFICANT CHANGE UP
BUN SERPL-MCNC: 32 MG/DL — HIGH (ref 10–20)
CALCIUM SERPL-MCNC: 8.8 MG/DL — SIGNIFICANT CHANGE UP (ref 8.5–10.1)
CHLORIDE SERPL-SCNC: 110 MMOL/L — SIGNIFICANT CHANGE UP (ref 98–110)
CO2 SERPL-SCNC: 22 MMOL/L — SIGNIFICANT CHANGE UP (ref 17–32)
COLOR SPEC: SIGNIFICANT CHANGE UP
CREAT SERPL-MCNC: 0.9 MG/DL — SIGNIFICANT CHANGE UP (ref 0.7–1.5)
DIFF PNL FLD: NEGATIVE — SIGNIFICANT CHANGE UP
EGFR: 62 ML/MIN/1.73M2 — SIGNIFICANT CHANGE UP
EOSINOPHIL # BLD AUTO: 0 K/UL — SIGNIFICANT CHANGE UP (ref 0–0.7)
EOSINOPHIL NFR BLD AUTO: 0 % — SIGNIFICANT CHANGE UP (ref 0–8)
EPI CELLS # UR: 1 /HPF — SIGNIFICANT CHANGE UP (ref 0–5)
GLUCOSE SERPL-MCNC: 102 MG/DL — HIGH (ref 70–99)
GLUCOSE UR QL: NEGATIVE — SIGNIFICANT CHANGE UP
HCT VFR BLD CALC: 28.5 % — LOW (ref 37–47)
HGB BLD-MCNC: 9.6 G/DL — LOW (ref 12–16)
HYALINE CASTS # UR AUTO: 3 /LPF — SIGNIFICANT CHANGE UP (ref 0–7)
IMM GRANULOCYTES NFR BLD AUTO: 1.1 % — HIGH (ref 0.1–0.3)
INR BLD: 1 RATIO — SIGNIFICANT CHANGE UP (ref 0.65–1.3)
KETONES UR-MCNC: NEGATIVE — SIGNIFICANT CHANGE UP
LEUKOCYTE ESTERASE UR-ACNC: NEGATIVE — SIGNIFICANT CHANGE UP
LYMPHOCYTES # BLD AUTO: 1.02 K/UL — LOW (ref 1.2–3.4)
LYMPHOCYTES # BLD AUTO: 13.5 % — LOW (ref 20.5–51.1)
MAGNESIUM SERPL-MCNC: 1.7 MG/DL — LOW (ref 1.8–2.4)
MCHC RBC-ENTMCNC: 27.9 PG — SIGNIFICANT CHANGE UP (ref 27–31)
MCHC RBC-ENTMCNC: 33.7 G/DL — SIGNIFICANT CHANGE UP (ref 32–37)
MCV RBC AUTO: 82.8 FL — SIGNIFICANT CHANGE UP (ref 81–99)
MONOCYTES # BLD AUTO: 0.85 K/UL — HIGH (ref 0.1–0.6)
MONOCYTES NFR BLD AUTO: 11.2 % — HIGH (ref 1.7–9.3)
NEUTROPHILS # BLD AUTO: 5.62 K/UL — SIGNIFICANT CHANGE UP (ref 1.4–6.5)
NEUTROPHILS NFR BLD AUTO: 74.1 % — SIGNIFICANT CHANGE UP (ref 42.2–75.2)
NITRITE UR-MCNC: NEGATIVE — SIGNIFICANT CHANGE UP
NRBC # BLD: 0 /100 WBCS — SIGNIFICANT CHANGE UP (ref 0–0)
PH UR: 6 — SIGNIFICANT CHANGE UP (ref 5–8)
PLATELET # BLD AUTO: 211 K/UL — SIGNIFICANT CHANGE UP (ref 130–400)
POTASSIUM SERPL-MCNC: 3.7 MMOL/L — SIGNIFICANT CHANGE UP (ref 3.5–5)
POTASSIUM SERPL-SCNC: 3.7 MMOL/L — SIGNIFICANT CHANGE UP (ref 3.5–5)
PROT SERPL-MCNC: 5.5 G/DL — LOW (ref 6–8)
PROT SERPL-MCNC: 5.6 G/DL — LOW (ref 6–8)
PROT UR-MCNC: ABNORMAL
PROTHROM AB SERPL-ACNC: 11.5 SEC — SIGNIFICANT CHANGE UP (ref 9.95–12.87)
RBC # BLD: 3.44 M/UL — LOW (ref 4.2–5.4)
RBC # FLD: 14.5 % — SIGNIFICANT CHANGE UP (ref 11.5–14.5)
RBC CASTS # UR COMP ASSIST: 2 /HPF — SIGNIFICANT CHANGE UP (ref 0–4)
SODIUM SERPL-SCNC: 144 MMOL/L — SIGNIFICANT CHANGE UP (ref 135–146)
SP GR SPEC: 1.02 — SIGNIFICANT CHANGE UP (ref 1.01–1.03)
UROBILINOGEN FLD QL: SIGNIFICANT CHANGE UP
WBC # BLD: 7.58 K/UL — SIGNIFICANT CHANGE UP (ref 4.8–10.8)
WBC # FLD AUTO: 7.58 K/UL — SIGNIFICANT CHANGE UP (ref 4.8–10.8)
WBC UR QL: 2 /HPF — SIGNIFICANT CHANGE UP (ref 0–5)

## 2022-08-16 PROCEDURE — 93306 TTE W/DOPPLER COMPLETE: CPT | Mod: 26

## 2022-08-16 PROCEDURE — 99233 SBSQ HOSP IP/OBS HIGH 50: CPT

## 2022-08-16 RX ORDER — MAGNESIUM SULFATE 500 MG/ML
2 VIAL (ML) INJECTION ONCE
Refills: 0 | Status: COMPLETED | OUTPATIENT
Start: 2022-08-16 | End: 2022-08-16

## 2022-08-16 RX ADMIN — Medication 200 MILLIGRAM(S): at 17:33

## 2022-08-16 RX ADMIN — Medication 200 MILLIGRAM(S): at 05:23

## 2022-08-16 RX ADMIN — SODIUM CHLORIDE 75 MILLILITER(S): 9 INJECTION, SOLUTION INTRAVENOUS at 13:27

## 2022-08-16 RX ADMIN — REMDESIVIR 500 MILLIGRAM(S): 5 INJECTION INTRAVENOUS at 13:26

## 2022-08-16 RX ADMIN — Medication 25 GRAM(S): at 17:34

## 2022-08-16 RX ADMIN — Medication 81 MILLIGRAM(S): at 13:27

## 2022-08-16 RX ADMIN — Medication 90 MILLIGRAM(S): at 05:23

## 2022-08-16 RX ADMIN — SERTRALINE 100 MILLIGRAM(S): 25 TABLET, FILM COATED ORAL at 13:26

## 2022-08-16 RX ADMIN — Medication 1000 UNIT(S): at 13:27

## 2022-08-16 RX ADMIN — SODIUM ZIRCONIUM CYCLOSILICATE 5 GRAM(S): 10 POWDER, FOR SUSPENSION ORAL at 05:23

## 2022-08-16 NOTE — PROGRESS NOTE ADULT - SUBJECTIVE AND OBJECTIVE BOX
EMILIANO ANAYA 86y Female  MRN#: [id number]   Hospital Day: 3d    HPI:  86y  F with PMH of HTN, CVA with Rt residual deficit, HLD, COPD not on home O2, CHF presented to the ED for recent COVID diagnosis, worsening shortness of breath and diarrhea. Patient was given Paxclovid with outpatient but for the past 24 to 48 hours has had worsening weakness, diarrhea, cough and shortness of breath.  Patient at triage was satting in the low 80s and hypotensive.  Patient has no other complaints but is feeling otherwise weak.    ED vitals:   T(F): 97.9 ( @ 13:45), Max: 97.9 ( @ 13:45)  HR: 68 ( @ 23:00) (59 - 69)  BP: 111/58 ( @ 23:00) (89/52 - 111/58)  RR: 18 ( @ 23:00) (16 - 18)  SpO2: 90% ( @ 23:00) (90% - 93%)    ED workup:                         10.0   8.96  )-----------( 206      ( 13 Aug 2022 15:44 )             30.3       08    132<L>  |  106  |  59<H>  ----------------------------<  90  7.2<HH>   |  15<L>  |  2.8<H>    Ca    9.0      13 Aug 2022 21:00  TPro  6.3  /  Alb  4.0  /  TBili  <0.2  /  DBili  x   /  AST  11  /  ALT  9   /  AlkPhos  51  08-13  LIVER FUNCTIONS - ( 13 Aug 2022 15:44 )  Alb: 4.0 g/dL / Pro: 6.3 g/dL / ALK PHOS: 51 U/L / ALT: 9 U/L / AST: 11 U/L / GGT: x            CARDIAC MARKERS ( 13 Aug 2022 15:44 )  x     / <0.01 ng/mL / x     / x     / x          Patient being admitted for management of COVID, MARY and hyperkalemia.  (13 Aug 2022 22:31)      SUBJECTIVE  Patient is a 86y old Female who presents with a chief complaint of COVID, diarrhea, vomiting, weakness (16 Aug 2022 13:10)  Currently admitted to medicine with the primary diagnosis of 2019 novel coronavirus disease (COVID-19)      INTERVAL HPI AND OVERNIGHT EVENTS:  Patient was examined and seen at bedside. This morning she is resting comfortably in bed and reports no issues or overnight events.    REVIEW OF SYMPTOMS:  CONSTITUTIONAL: No weakness, fevers or chills; No headaches  EYES: No visual changes, eye pain, or discharge  ENT: No vertigo; No ear pain or change in hearing; No sore throat or difficulty swallowing  NECK: No pain or stiffness  RESPIRATORY: No cough, wheezing, or hemoptysis; No shortness of breath  CARDIOVASCULAR: No chest pain or palpitations  GASTROINTESTINAL: No abdominal or epigastric pain; No nausea, vomiting, or hematemesis; No diarrhea or constipation; No melena or hematochezia  GENITOURINARY: No dysuria, frequency or hematuria  MUSCULOSKELETAL: No joint pain, no muscle pain, no weakness  NEUROLOGICAL: No numbness or weakness  SKIN: No itching or rashes    OBJECTIVE  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    No significant past surgical history      ALLERGIES:  Celebrex (Other (Moderate))  sulfonamides (Unknown)    MEDICATIONS:  STANDING MEDICATIONS  aspirin  chewable 81 milliGRAM(s) Oral daily  cholecalciferol 1000 Unit(s) Oral daily  heparin   Injectable 5000 Unit(s) SubCutaneous every 12 hours  labetalol 200 milliGRAM(s) Oral two times a day  NIFEdipine XL 90 milliGRAM(s) Oral daily  remdesivir  IVPB 100 milliGRAM(s) IV Intermittent every 24 hours  remdesivir  IVPB   IV Intermittent   sertraline 100 milliGRAM(s) Oral daily  sodium chloride 0.45% 1000 milliLiter(s) IV Continuous <Continuous>  sodium zirconium cyclosilicate 5 Gram(s) Oral every 8 hours    PRN MEDICATIONS  acetaminophen     Tablet .. 650 milliGRAM(s) Oral every 4 hours PRN  albuterol/ipratropium for Nebulization 3 milliLiter(s) Nebulizer every 6 hours PRN  benzonatate 100 milliGRAM(s) Oral three times a day PRN      VITAL SIGNS: Last 24 Hours  T(C): 37.3 (16 Aug 2022 11:45), Max: 37.3 (16 Aug 2022 11:45)  T(F): 99.1 (16 Aug 2022 11:45), Max: 99.1 (16 Aug 2022 11:45)  HR: 79 (16 Aug 2022 11:45) (55 - 82)  BP: 166/72 (16 Aug 2022 11:45) (145/65 - 191/74)  BP(mean): 103 (16 Aug 2022 11:45) (93 - 103)  RR: 18 (16 Aug 2022 11:45) (18 - 18)  SpO2: 93% (16 Aug 2022 11:45) (72% - 97%)    LABS:                        9.6    7.58  )-----------( 211      ( 16 Aug 2022 08:59 )             28.5     08-16    144  |  110  |  32<H>  ----------------------------<  102<H>  3.7   |  22  |  0.9    Ca    8.8      16 Aug 2022 08:59  Mg     1.7     08-16    TPro  5.5<L>  /  Alb  3.5  /  TBili  <0.2  /  DBili  <0.2  /  AST  10  /  ALT  8   /  AlkPhos  43  08-16    PT/INR - ( 16 Aug 2022 08:59 )   PT: 11.50 sec;   INR: 1.00 ratio           Urinalysis Basic - ( 16 Aug 2022 03:06 )    Color: Light Yellow / Appearance: Clear / S.018 / pH: x  Gluc: x / Ketone: Negative  / Bili: Negative / Urobili: <2 mg/dL   Blood: x / Protein: 30 mg/dL / Nitrite: Negative   Leuk Esterase: Negative / RBC: 2 /HPF / WBC 2 /HPF   Sq Epi: x / Non Sq Epi: 1 /HPF / Bacteria: Negative                RADIOLOGY:      PHYSICAL EXAM:  CONSTITUTIONAL: No acute distress, AAOx3  HEAD: Atraumatic, normocephalic  EYES: EOM intact, PERRLA, conjunctiva and sclera clear  ENT: Supple, no masses, no thyromegaly, no bruits, no JVD; moist mucous membranes  PULMONARY: Clear to auscultation bilaterally; no wheezes, rales, or rhonchi  CARDIOVASCULAR: Regular rate and rhythm; no murmurs, rubs, or gallops  GASTROINTESTINAL: Soft, non-tender, non-distended; bowel sounds present  MUSCULOSKELETAL: 2+ peripheral pulses; no clubbing, no cyanosis, no edema  NEUROLOGY: right sided weakness  SKIN: No rashes or lesions; warm and dry    ASSESSMENT & PLAN  86y  F with PMH of HTN, CVA with Rt residual deficit, HLD, COPD not on home O2, CHF presented to the ED for recent COVID diagnosis, worsening shortness of breath and diarrhea. Patient was given Paxclovid with outpatient but for the past 24 to 48 hours has had worsening weakness, diarrhea, cough and shortness of breath.  Patient at triage was satting in the low 80s and hypotensive.  Patient has no other complaints but is feeling otherwise weak.    # Diarrhea likely reaction to paxlovid vs. due to COVID-19 viral illness  - resolved indicating reaction to paxlovid     # COVID-19 infection  - cannot tolerate paxlovid   - c/w remdisivir for 5 days but can go home earlier if better  - dexamethasone discontinued     # MARY, resolving  # Hyperkalemia  - c/w IVF, monitor BMP  - on lokelma, D/C if K<4.5  - spironolactone, enalapril on hold    # COPD, advanced  - c/w oxygen supplementation as needed  - need assessment for home oxygen upon discharge     # CVA with mild right side weakness   - c/w aspirin    # HTN, elevated  - c/w labetalol, resume nifedipine in AM  - spironolactone, enalapril on hold for MARY    # DVT prophylaxis  - on heparin subcut

## 2022-08-16 NOTE — CHART NOTE - NSCHARTNOTEFT_GEN_A_CORE
Transfer Note    Transfer from: [facility]  Transfer to:  ( x ) Medicine    (  ) Telemetry    (  ) RCU    (  ) Palliative    (  ) Stroke Unit    (  ) _______________    HOSPITAL COURSE:  86y  F with PMH of HTN, CVA with Rt residual deficit, HLD, COPD not on home O2, CHF presented to the ED for recent COVID diagnosis, worsening shortness of breath and diarrhea. Patient was given Paxclovid with outpatient but for the past 24 to 48 hours has had worsening weakness, diarrhea, cough and shortness of breath.  Patient at triage was satting in the low 80s and hypotensive.  Patient has no other complaints but is feeling otherwise weak.    # Diarrhea likely reaction to paxlovid vs. due to COVID-19 viral illness  - resolved indicating reaction to paxlovid     # COVID-19 infection  - cannot tolerate paxlovid   - c/w remdisivir for 5 days but can go home earlier if better  - dexamethasone discontinued     # MARY, resolving  # Hyperkalemia  - c/w IVF 1/2 NS, monitor BMP  - on lokelma, D/C if K<4.5  - spironolactone, enalapril on hold    # COPD, advanced  - c/w oxygen supplementation as needed  - will receive home oxygen     # CVA with mild right side weakness   - c/w aspirin    # HTN, elevated  - labetalol and nifedipine  - spironolactone, enalapril on hold for MARY    # DVT prophylaxis  - on heparin subcut      For Follow-Up:  - BMP  - oxygen saturation  - consider restarting antihypertensives          Vital Signs Last 24 Hrs  T(C): 37.3 (16 Aug 2022 11:45), Max: 37.3 (16 Aug 2022 11:45)  T(F): 99.1 (16 Aug 2022 11:45), Max: 99.1 (16 Aug 2022 11:45)  HR: 79 (16 Aug 2022 11:45) (55 - 82)  BP: 166/72 (16 Aug 2022 11:45) (145/65 - 191/74)  BP(mean): 103 (16 Aug 2022 11:45) (93 - 103)  RR: 18 (16 Aug 2022 11:45) (18 - 18)  SpO2: 93% (16 Aug 2022 11:45) (72% - 97%)    Parameters below as of 16 Aug 2022 11:45  Patient On (Oxygen Delivery Method): room air      I&O's Summary    15 Aug 2022 07:01  -  16 Aug 2022 07:00  --------------------------------------------------------  IN: 300 mL / OUT: 0 mL / NET: 300 mL          MEDICATIONS  (STANDING):  aspirin  chewable 81 milliGRAM(s) Oral daily  cholecalciferol 1000 Unit(s) Oral daily  heparin   Injectable 5000 Unit(s) SubCutaneous every 12 hours  labetalol 200 milliGRAM(s) Oral two times a day  NIFEdipine XL 90 milliGRAM(s) Oral daily  remdesivir  IVPB 100 milliGRAM(s) IV Intermittent every 24 hours  remdesivir  IVPB   IV Intermittent   sertraline 100 milliGRAM(s) Oral daily  sodium chloride 0.45% 1000 milliLiter(s) (75 mL/Hr) IV Continuous <Continuous>  sodium zirconium cyclosilicate 5 Gram(s) Oral every 8 hours    MEDICATIONS  (PRN):  acetaminophen     Tablet .. 650 milliGRAM(s) Oral every 4 hours PRN Temp greater or equal to 38.5C (101.3F)  albuterol/ipratropium for Nebulization 3 milliLiter(s) Nebulizer every 6 hours PRN Shortness of Breath and/or Wheezing  benzonatate 100 milliGRAM(s) Oral three times a day PRN Cough        LABS                                            9.6                   Neurophils% (auto):   74.1   (08-16 @ 08:59):    7.58 )-----------(211          Lymphocytes% (auto):  13.5                                          28.5                   Eosinphils% (auto):   0.0      Manual%: Neutrophils x    ; Lymphocytes x    ; Eosinophils x    ; Bands%: x    ; Blasts x                                    144    |  110    |  32                  Calcium: 8.8   / iCa: x      (08-16 @ 08:59)    ----------------------------<  102       Magnesium: 1.7                              3.7     |  22     |  0.9              Phosphorous: x        TPro  5.5    /  Alb  3.5    /  TBili  <0.2   /  DBili  <0.2   /  AST  10     /  ALT  8      /  AlkPhos  43     16 Aug 2022 08:59    ( 08-16 @ 08:59 )   PT: 11.50 sec;   INR: 1.00 ratio  aPTT: x

## 2022-08-16 NOTE — PATIENT PROFILE ADULT - NUMBER OF YRS
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 Jame Boast who checked for myasthenia gravis negative    His vision is improved back to normal    He was referred to cardiologist Dr. 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 patient had a monitor and echocardiogram report pending patient has a follow-up appointment      Denies any chest pain or palpitation    Right wrist pain has improved    Past Medical History:   Diagnosis Date    Essential hypertension 5/11/2021    Gout         Social History     Socioeconomic History    Marital status:      Spouse name: Not on file    Number of children: Not on file    Years of education: Not on file    Highest education level: Not on file   Occupational History    Not on file   Tobacco Use    Smoking status: Never Smoker    Smokeless tobacco: Never Used   Substance and Sexual Activity    Alcohol use: Yes     Alcohol/week: 4.0 standard drinks     Types: 4 Glasses of wine per week    Drug use: No    Sexual activity: Not on file   Other Topics Concern    Not on file   Social History Narrative    Not on file     Social Determinants of Health     Financial Resource Strain:     Difficulty of Paying Living Expenses:    Food Insecurity:     Worried About Running Out of Food in the Last Year:     [address] in the Last Year:    Transportation Needs: No Transportation Needs    Lack of Transportation (Medical): No    Lack of Transportation (Non-Medical):  No   Physical Activity:     Days of Exercise per Week:     Minutes of Exercise per Session:    Stress:     Feeling of Stress :    Social Connections:     Frequency of Communication with Friends and Family:     Frequency of Social daily 30 tablet 3    aspirin 81 MG tablet Take 81 mg by mouth daily       No current facility-administered medications for this visit.         Last 3 BMP  Lab Results   Component Value Date/Time     05/25/2021 08:05 AM     12/14/2020 09:00 AM     12/11/2019 09:35 AM    K 4.7 05/25/2021 08:05 AM    K 4.6 12/14/2020 09:00 AM    K 4.6 12/11/2019 09:35 AM     05/25/2021 08:05 AM     12/14/2020 09:00 AM    CL 92 (L) 12/11/2019 09:35 AM    CO2 24 05/25/2021 08:05 AM    CO2 25 12/14/2020 09:00 AM    CO2 24 12/11/2019 09:35 AM    BUN 18 05/25/2021 08:05 AM    BUN 13 12/14/2020 09:00 AM    BUN 21 12/11/2019 09:35 AM    CREATININE 1.0 05/25/2021 08:05 AM    CREATININE 1.2 12/14/2020 09:00 AM    CREATININE 1.1 12/11/2019 09:35 AM    CREATININE 1.0 12/19/2018 12:00 AM    GLUCOSE 110 (H) 05/25/2021 08:05 AM    GLUCOSE 107 (H) 12/14/2020 09:00 AM    GLUCOSE 121 (H) 12/11/2019 09:35 AM    GLUCOSE 112 (H) 02/24/2012 09:10 AM    GLUCOSE 104 03/25/2011 08:48 AM    CALCIUM 9.5 05/25/2021 08:05 AM    CALCIUM 10.0 12/14/2020 09:00 AM    CALCIUM 10.0 12/11/2019 09:35 AM       Last 3 CMP:    Lab Results   Component Value Date/Time     05/25/2021 08:05 AM     12/14/2020 09:00 AM     12/11/2019 09:35 AM    K 4.7 05/25/2021 08:05 AM    K 4.6 12/14/2020 09:00 AM    K 4.6 12/11/2019 09:35 AM     05/25/2021 08:05 AM     12/14/2020 09:00 AM    CL 92 (L) 12/11/2019 09:35 AM    CO2 24 05/25/2021 08:05 AM    CO2 25 12/14/2020 09:00 AM    CO2 24 12/11/2019 09:35 AM    BUN 18 05/25/2021 08:05 AM    BUN 13 12/14/2020 09:00 AM    BUN 21 12/11/2019 09:35 AM    CREATININE 1.0 05/25/2021 08:05 AM    CREATININE 1.2 12/14/2020 09:00 AM    CREATININE 1.1 12/11/2019 09:35 AM    CREATININE 1.0 12/19/2018 12:00 AM    GLUCOSE 110 (H) 05/25/2021 08:05 AM    GLUCOSE 107 (H) 12/14/2020 09:00 AM    GLUCOSE 121 (H) 12/11/2019 09:35 AM    GLUCOSE 112 (H) 02/24/2012 09:10 AM    GLUCOSE 104 03/25/2011 08:48 AM CALCIUM 9.5 05/25/2021 08:05 AM    CALCIUM 10.0 12/14/2020 09:00 AM    CALCIUM 10.0 12/11/2019 09:35 AM    PROT 7.2 05/25/2021 08:05 AM    PROT 7.5 12/14/2020 09:00 AM    PROT 7.7 12/11/2019 09:35 AM    LABALBU 4.3 05/25/2021 08:05 AM    LABALBU 4.7 12/14/2020 09:00 AM    LABALBU 4.3 12/11/2019 09:35 AM    LABALBU 4.3 02/24/2012 09:10 AM    LABALBU 4.3 03/25/2011 08:48 AM    BILITOT 0.5 05/25/2021 08:05 AM    BILITOT 1.3 (H) 12/14/2020 09:00 AM    BILITOT 0.7 12/11/2019 09:35 AM    ALKPHOS 72 05/25/2021 08:05 AM    ALKPHOS 68 12/14/2020 09:00 AM    ALKPHOS 72 12/11/2019 09:35 AM    AST 15 05/25/2021 08:05 AM    AST 14 12/14/2020 09:00 AM    AST 13 12/11/2019 09:35 AM    ALT 14 05/25/2021 08:05 AM    ALT 15 12/14/2020 09:00 AM    ALT 21 12/11/2019 09:35 AM        CBC:   Lab Results   Component Value Date/Time    WBC 7.9 05/25/2021 08:05 AM    RBC 5.07 05/25/2021 08:05 AM    HGB 16.0 05/25/2021 08:05 AM    HCT 48.6 05/25/2021 08:05 AM    MCV 95.9 05/25/2021 08:05 AM    MCH 31.6 05/25/2021 08:05 AM    MCHC 32.9 05/25/2021 08:05 AM    RDW 12.6 05/25/2021 08:05 AM     05/25/2021 08:05 AM    MPV 10.6 05/25/2021 08:05 AM       A1C:  Lab Results   Component Value Date/Time    LABA1C 5.3 05/12/2021 08:21 AM       Lipid panel:  Lab Results   Component Value Date    CHOL 174 05/25/2021    CHOL 171 12/14/2020    CHOL 167 12/11/2019    TRIG 87 05/25/2021    TRIG 169 12/14/2020    TRIG 66 12/11/2019    HDL 49 05/25/2021    HDL 46 12/14/2020    HDL 48 12/11/2019        Lab Results   Component Value Date/Time    PROT 7.2 05/25/2021 08:05 AM    PROT 7.5 12/14/2020 09:00 AM    PROT 7.7 12/11/2019 09:35 AM       No results found for: MG      Assessment. Kaushik Jorge was seen today for 1 month follow-up. Diagnoses and all orders for this visit:    Hyperlipidemia, unspecified hyperlipidemia type  -     COMPREHENSIVE METABOLIC PANEL; Future  -     LIPID PANEL;  Future    TIA (transient ischemic attack)    Essential hypertension    Double vision    Acute drug-induced gout of right wrist    Other orders  -     rosuvastatin (CRESTOR) 5 MG tablet; Take 1 tablet by mouth nightly       Patient Active Problem List   Diagnosis    Tendinitis of forearm    Essential hypertension    TIA (transient ischemic attack)       Plan: Labs reviewed  Hyperlipidemia  history of TIA ocular CVA add Crestor 5 mg daily continue baby aspirin    Hypertension very good control continue amlodipine    TIA resolved  Double vision improved seen locally in [facility] clinic with ophthalmologist      History of gout improved    Uric acid normal range plenty of fluids    Repeat labs in 3 months    Return in about 3 months (around 9/21/2021).        Maris Pruitt MD  2:19 PM  6/21/2021     DE 0

## 2022-08-16 NOTE — PATIENT PROFILE ADULT - FALL HARM RISK - HARM RISK INTERVENTIONS

## 2022-08-16 NOTE — PATIENT PROFILE ADULT - CAREGIVER PHONE NUMBER
S/p left hip hemiarthroplasty on 9/22/18  H/H 8.9/26.8  Will transfuse 1 unit of PRBCs   Monitor H/H      [phone]

## 2022-08-16 NOTE — PROGRESS NOTE ADULT - ASSESSMENT
IMPRESSION:    Acute kidney injury - likely pre renal improving  Severe hyperkalemia  improving  Normal anion gap metabolic acidosis  Diarrhea  COVID-19 viral infection on RA      PLAN:    CNS: No depressants. Mental status is at baseline. Continue ASA for history of stroke.    HEENT: Oral care    PULMONARY:  HOB @ 45 degrees.  Keep O2 saturation more than 92%. DC decadron    CARDIOVASCULAR: 1/2 NS add 1 1/2 bicarb at 75 cc/ h x 24h, start amlodipine    GI: GI prophylaxis.  Feeding PO diet as tolerated.     RENAL: trend CMP, lokelma    INFECTIOUS DISEASE: Follow up cultures: trend markers, ID fup    HEMATOLOGICAL:  DVT prophylaxis: subcutaneous heparin. keep hgb more than 7.    ENDOCRINE:  Follow up FS.  Insulin protocol if needed.    MUSCULOSKELETAL: out of bed as tolerated.     SDU         IMPRESSION:    Acute kidney injury - likely pre renal improving  Severe hyperkalemia  improving  Normal anion gap metabolic acidosis  Diarrhea  COVID-19 viral infection on RA      PLAN:    CNS: No depressants. Mental status is at baseline. Continue ASA for history of stroke.    HEENT: Oral care    PULMONARY:  HOB @ 45 degrees.  Keep O2 saturation more than 92%. DC decadron    CARDIOVASCULAR: 1/2 NS add 1 1/2 bicarb at 75 cc/ h x 24h, amlodipine    GI: GI prophylaxis.  Feeding PO diet as tolerated.     RENAL: trend CMP, lokelma    INFECTIOUS DISEASE: Follow up cultures: trend markers, ID fup    HEMATOLOGICAL:  DVT prophylaxis: subcutaneous heparin. keep hgb more than 7.    ENDOCRINE:  Follow up FS.  Insulin protocol if needed.    MUSCULOSKELETAL: out of bed as tolerated.   FLOOR

## 2022-08-16 NOTE — PROGRESS NOTE ADULT - SUBJECTIVE AND OBJECTIVE BOX
Patient is a 86y old  Female who presents with a chief complaint of COVID, diarrhea, vomiting, weakness (15 Aug 2022 20:47)        Over Night Events:    No events overnight . On RA.     ROS:  See HPI    PHYSICAL EXAM    ICU Vital Signs Last 24 Hrs  T(C): 36.9 (16 Aug 2022 07:15), Max: 37 (16 Aug 2022 01:32)  T(F): 98.5 (16 Aug 2022 07:15), Max: 98.6 (16 Aug 2022 01:32)  HR: 72 (16 Aug 2022 07:15) (55 - 82)  BP: 145/65 (16 Aug 2022 07:15) (145/65 - 191/74)  BP(mean): 93 (16 Aug 2022 07:15) (93 - 93)  ABP: --  ABP(mean): --  RR: 18 (16 Aug 2022 07:15) (18 - 18)  SpO2: 93% (16 Aug 2022 07:15) (72% - 97%)    O2 Parameters below as of 16 Aug 2022 07:15  Patient On (Oxygen Delivery Method): room air        08-15-22 @ 07:01  -  22 @ 07:00  --------------------------------------------------------  IN:    sodium chloride 0.45% w/ Additives: 300 mL  Total IN: 300 mL    OUT:  Total OUT: 0 mL    Total NET: 300 mL        CONSTITUTIONAL:  In  NAD    ENT:   Airway patent,   No thrush    EYES:   Clear bilaterally,   pupils equal,   round and reactive to light.    CARDIAC:   Normal rate,   regular rhythm.    no edema      CAROTID:   normal systolic impulse  no bruits    RESPIRATORY:   Mild rhonchi  Normal chest expansion  Not tachypneic,  No use of accessory muscles    GASTROINTESTINAL:  Abdomen soft,   non-tender,   no guarding,   + BS    MUSCULOSKELETAL:   range of motion is not limited,  no clubbing, cyanosis    NEUROLOGICAL:   Alert and oriented   no motor deficits.    SKIN:   Skin normal color for race,   No evidence of rash.    LABS:                            9.9    8.93  )-----------( 243      ( 15 Aug 2022 06:40 )             30.3                                               08-15    x   |  x   |  x   ----------------------------<  x   x    |  x   |  1.3    Ca    9.3      15 Aug 2022 06:40  Mg     1.9     08-15    TPro  5.8<L>  /  Alb  3.6  /  TBili  <0.2  /  DBili  <0.2  /  AST  11  /  ALT  8   /  AlkPhos  46  08-15      PT/INR - ( 15 Aug 2022 11:30 )   PT: 11.10 sec;   INR: 0.96 ratio                                                Urinalysis Basic - ( 16 Aug 2022 03:06 )    Color: Light Yellow / Appearance: Clear / S.018 / pH: x  Gluc: x / Ketone: Negative  / Bili: Negative / Urobili: <2 mg/dL   Blood: x / Protein: 30 mg/dL / Nitrite: Negative   Leuk Esterase: Negative / RBC: 2 /HPF / WBC 2 /HPF   Sq Epi: x / Non Sq Epi: 1 /HPF / Bacteria: Negative                                              LIVER FUNCTIONS - ( 15 Aug 2022 11:30 )  Alb: 3.6 g/dL / Pro: 5.8 g/dL / ALK PHOS: 46 U/L / ALT: 8 U/L / AST: 11 U/L / GGT: x                    Procalcitonin, Serum: 0.22 ng/mL (22 @ 17:45)  Ferritin, Serum: 158 ng/mL (22 @ 21:00)  D-Dimer Assay, Quantitative: 217 ng/mL DDU (22 @ 21:00)  C-Reactive Protein, Serum: 104.5 mg/L (22 @ 21:00)                                           MEDICATIONS  (STANDING):  aspirin  chewable 81 milliGRAM(s) Oral daily  cholecalciferol 1000 Unit(s) Oral daily  heparin   Injectable 5000 Unit(s) SubCutaneous every 12 hours  labetalol 200 milliGRAM(s) Oral two times a day  NIFEdipine XL 90 milliGRAM(s) Oral daily  remdesivir  IVPB 100 milliGRAM(s) IV Intermittent every 24 hours  remdesivir  IVPB   IV Intermittent   sertraline 100 milliGRAM(s) Oral daily  sodium chloride 0.45% 1000 milliLiter(s) (75 mL/Hr) IV Continuous <Continuous>  sodium zirconium cyclosilicate 5 Gram(s) Oral every 8 hours    MEDICATIONS  (PRN):  acetaminophen     Tablet .. 650 milliGRAM(s) Oral every 4 hours PRN Temp greater or equal to 38.5C (101.3F)  albuterol/ipratropium for Nebulization 3 milliLiter(s) Nebulizer every 6 hours PRN Shortness of Breath and/or Wheezing  benzonatate 100 milliGRAM(s) Oral three times a day PRN Cough      Xrays:                                                                                     ECHO     Patient is a 86y old  Female who presents with a chief complaint of COVID, diarrhea, vomiting, weakness (15 Aug 2022 20:47)        Over Night Events:    No events overnight . On RA. afebrile      PHYSICAL EXAM    ICU Vital Signs Last 24 Hrs  T(C): 36.9 (16 Aug 2022 07:15), Max: 37 (16 Aug 2022 01:32)  T(F): 98.5 (16 Aug 2022 07:15), Max: 98.6 (16 Aug 2022 01:32)  HR: 72 (16 Aug 2022 07:15) (55 - 82)  BP: 145/65 (16 Aug 2022 07:15) (145/65 - 191/74)  BP(mean): 93 (16 Aug 2022 07:15) (93 - 93)  RR: 18 (16 Aug 2022 07:15) (18 - 18)  SpO2: 93% (16 Aug 2022 07:15) (72% - 97%)    O2 Parameters below as of 16 Aug 2022 07:15  Patient On (Oxygen Delivery Method): room air        08-15-22 @ 07:01  -  22 @ 07:00  --------------------------------------------------------  IN:    sodium chloride 0.45% w/ Additives: 300 mL  Total IN: 300 mL    OUT:  Total OUT: 0 mL    Total NET: 300 mL        CONSTITUTIONAL:  Ill looking    ENT:   Airway patent,   No thrush    EYES:   Clear bilaterally,   pupils equal,   round and reactive to light.    CARDIAC:   IMMANUEL 2.6        RESPIRATORY:   Mild rhonchi  Normal chest expansion  Not tachypneic,  No use of accessory muscles    GASTROINTESTINAL:  Abdomen soft,   non-tender,   no guarding,   + BS    MUSCULOSKELETAL:   range of motion is not limited,  no clubbing, cyanosis    NEUROLOGICAL:   Alert and oriented   no motor deficits.        LABS:                            9.9    8.93  )-----------( 243      ( 15 Aug 2022 06:40 )             30.3                                               08-15    x   |  x   |  x   ----------------------------<  x   x    |  x   |  1.3    Ca    9.3      15 Aug 2022 06:40  Mg     1.9     08-15    TPro  5.8<L>  /  Alb  3.6  /  TBili  <0.2  /  DBili  <0.2  /  AST  11  /  ALT  8   /  AlkPhos  46  08-15      PT/INR - ( 15 Aug 2022 11:30 )   PT: 11.10 sec;   INR: 0.96 ratio                                                Urinalysis Basic - ( 16 Aug 2022 03:06 )    Color: Light Yellow / Appearance: Clear / S.018 / pH: x  Gluc: x / Ketone: Negative  / Bili: Negative / Urobili: <2 mg/dL   Blood: x / Protein: 30 mg/dL / Nitrite: Negative   Leuk Esterase: Negative / RBC: 2 /HPF / WBC 2 /HPF   Sq Epi: x / Non Sq Epi: 1 /HPF / Bacteria: Negative                                              LIVER FUNCTIONS - ( 15 Aug 2022 11:30 )  Alb: 3.6 g/dL / Pro: 5.8 g/dL / ALK PHOS: 46 U/L / ALT: 8 U/L / AST: 11 U/L / GGT: x                    Procalcitonin, Serum: 0.22 ng/mL (22 @ 17:45)  Ferritin, Serum: 158 ng/mL (22 @ 21:00)  D-Dimer Assay, Quantitative: 217 ng/mL DDU (22 @ 21:00)  C-Reactive Protein, Serum: 104.5 mg/L (22 @ 21:00)                                           MEDICATIONS  (STANDING):  aspirin  chewable 81 milliGRAM(s) Oral daily  cholecalciferol 1000 Unit(s) Oral daily  heparin   Injectable 5000 Unit(s) SubCutaneous every 12 hours  labetalol 200 milliGRAM(s) Oral two times a day  NIFEdipine XL 90 milliGRAM(s) Oral daily  remdesivir  IVPB 100 milliGRAM(s) IV Intermittent every 24 hours  remdesivir  IVPB   IV Intermittent   sertraline 100 milliGRAM(s) Oral daily  sodium chloride 0.45% 1000 milliLiter(s) (75 mL/Hr) IV Continuous <Continuous>  sodium zirconium cyclosilicate 5 Gram(s) Oral every 8 hours    MEDICATIONS  (PRN):  acetaminophen     Tablet .. 650 milliGRAM(s) Oral every 4 hours PRN Temp greater or equal to 38.5C (101.3F)  albuterol/ipratropium for Nebulization 3 milliLiter(s) Nebulizer every 6 hours PRN Shortness of Breath and/or Wheezing  benzonatate 100 milliGRAM(s) Oral three times a day PRN Cough

## 2022-08-16 NOTE — PROGRESS NOTE ADULT - ASSESSMENT
ASSESSMENT  86y  F with PMH of HTN, CVA with Rt residual deficit, HLD, COPD not on home O2, CHF presented to the ED for recent COVID diagnosis, worsening shortness of breath and diarrhea    IMPRESSION  #COVID- severe  #Hypotension  #Diarrhea  #COPD  #MARY  #Hyperkalemia  #CHF    #Obesity BMI (kg/m2): 20.4  #Abx allergy: Celebrex (Other (Moderate))    RECOMMENDATIONS  - continue RDV for at least 5 days   - does not need to complete course in hospital is improved   - monitor O2   - recall as needed    Please call or message on [employer] Teams if with any questions.  Spectra [phone]

## 2022-08-16 NOTE — PROGRESS NOTE ADULT - SUBJECTIVE AND OBJECTIVE BOX
JACLYNEMILIANO  86y Female    CHIEF COMPLAINT:    Patient is a 86y old  Female who presents with a chief complaint of COVID, diarrhea, vomiting, weakness (16 Aug 2022 13:10)    INTERVAL HPI/OVERNIGHT EVENTS:    Patient seen and examined. No acute events overnight. No active complaints     ROS: All other systems are negative.    Vital Signs:    T(F): 99.1 (22 @ 11:45), Max: 99.1 (22 @ 11:45)  HR: 79 (22 @ 11:45) (55 - 82)  BP: 166/72 (22 @ 11:45) (145/65 - 191/74)  RR: 18 (22 @ 11:45) (18 - 18)  SpO2: 93% (22 @ 11:45) (72% - 97%)    15 Aug 2022 07:01  -  16 Aug 2022 07:00  --------------------------------------------------------  IN: 300 mL / OUT: 0 mL / NET: 300 mL    Daily Weight in k.1 (16 Aug 2022 05:21)    PHYSICAL EXAM:    GENERAL:  NAD  SKIN: No rashes or lesions  HEENT: Atraumatic. Normocephalic.   NECK: Supple, No JVD.   PULMONARY: Coarse breath sounds B/L. No wheezing.   CVS: Normal S1, S2. Rate and Rhythm are regular   ABDOMEN/GI: Soft, Nontender, Nondistended   MSK:  No clubbing or cyanosis   NEUROLOGIC: moves all extremities, weak   PSYCH: Awake and alert     Consultant(s) Notes Reviewed:  [x ] YES  [ ] NO  Care Discussed with Consultants/Other Providers [ x] YES  [ ] NO    LABS:                        9.6    7.58  )-----------( 211      ( 16 Aug 2022 08:59 )             28.5         144  |  110  |  32<H>  ----------------------------<  102<H>  3.7   |  22  |  0.9    Ca    8.8      16 Aug 2022 08:59  Mg     1.7         TPro  5.5<L>  /  Alb  3.5  /  TBili  <0.2  /  DBili  <0.2  /  AST  10  /  ALT  8   /  AlkPhos  43      PT/INR - ( 16 Aug 2022 08:59 )   PT: 11.50 sec;   INR: 1.00 ratio           Serum Pro-Brain Natriuretic Peptide: 3553 pg/mL (22 @ 17:45)    Trop <0.01, CKMB --, CK --, 22 @ 15:44        RADIOLOGY & ADDITIONAL TESTS:      Imaging or report Personally Reviewed:  [x] YES  [ ] NO  EKG reviewed: [x] YES  [ ] NO    Medications:  Standing  aspirin  chewable 81 milliGRAM(s) Oral daily  cholecalciferol 1000 Unit(s) Oral daily  heparin   Injectable 5000 Unit(s) SubCutaneous every 12 hours  labetalol 200 milliGRAM(s) Oral two times a day  NIFEdipine XL 90 milliGRAM(s) Oral daily  remdesivir  IVPB 100 milliGRAM(s) IV Intermittent every 24 hours  remdesivir  IVPB   IV Intermittent   sertraline 100 milliGRAM(s) Oral daily  silver nitrate 25% Solution 1 Application(s) Topical once  sodium chloride 0.45% 1000 milliLiter(s) IV Continuous <Continuous>    PRN Meds  acetaminophen     Tablet .. 650 milliGRAM(s) Oral every 4 hours PRN  albuterol/ipratropium for Nebulization 3 milliLiter(s) Nebulizer every 6 hours PRN  benzonatate 100 milliGRAM(s) Oral three times a day PRN

## 2022-08-16 NOTE — PROGRESS NOTE ADULT - ASSESSMENT
86y  F with PMH of HTN, CVA with Rt residual deficit, HLD, COPD not on home O2, CHF presented to the ED for recent COVID diagnosis, worsening shortness of breath and diarrhea. Patient was given Paxclovid with outpatient but for the past 24 to 48 hours has had worsening weakness, diarrhea, cough and shortness of breath.  Found to be hypoxic to 80s in the ED    Acute Hypoxemic respiratory failure secondary to severe COVID  COPD, not on home oxygen  Intolerance to PAxlovid  saturating 80s in ED triage, 90 % at rest, and 86% on exertion on room air today  setting up home 02  c/w RDV. S/p steroids  per daughter, patient mostly bedbound    MARY, resolved  Hyperkalemia - resolved  - c/w IVF, monitor BMP  - on lokelma, D/C if K<4.5  - spironolactone, enalapril on hold for now. Resume once renal fxn stable     CVA with mild right side weakness - c/w aspirin    HTN - c/w labetalol and nifedipine  - spironolactone, enalapril on hold for MARY    #Progress Note Handoff  Pending (specify):  labs, monitor 02 sats   Family discussion: Plan of care discussed with patient and daughter at bedside, aware and agreeable   Disposition:  from home     Lata Morejon MD  s. [phone]

## 2022-08-16 NOTE — CHART NOTE - NSCHARTNOTEFT_GEN_A_CORE
Pt saturates 86% on RA on exertion. Saturates 90% on 3 L NC on exertion. Oxygen saturation was tested in stable chronic condition. Pt diagnosis: COPD, COVID pneumonia Pt saturates at 90% on RA at rest. Pt saturates 86% on RA on exertion. Saturates 90% on 3 L NC on exertion. Oxygen saturation was tested in stable chronic condition. Pt diagnosis: COPD, COVID pneumonia.

## 2022-08-16 NOTE — PROGRESS NOTE ADULT - ATTENDING COMMENTS
events noted, weakness/ diarrhea/ covid/ renal failure improving on RA, IVF. RZD, floor, GOC, poor prognosis

## 2022-08-16 NOTE — PROGRESS NOTE ADULT - SUBJECTIVE AND OBJECTIVE BOX
EMILIANO ANAYA  86y, Female  Allergy: Celebrex (Other (Moderate))  sulfonamides (Unknown)      LOS  3d    CHIEF COMPLAINT: COVID, diarrhea, vomiting, weakness (16 Aug 2022 09:20)      INTERVAL EVENTS/HPI  - No acute events overnight  - T(F): , Max: 99.1 (22 @ 11:45)  - on 3L - reports diarrhea is improved  - WBC Count: 7.58 (22 @ 08:59)  WBC Count: 8.93 (08-15-22 @ 06:40)     - Creatinine, Serum: 0.9 (22 @ 08:59)  Creatinine, Serum: 1.3 (08-15-22 @ 11:30)       ROS  General: Denies rigors, nightsweats  HEENT: Denies headache, rhinorrhea, sore throat, eye pain  CV: Denies CP, palpitations  PULM: Denies wheezing, hemoptysis  GI: Denies hematemesis, hematochezia, melena  : Denies discharge, hematuria  MSK: Denies arthralgias, myalgias  SKIN: Denies rash, lesions  NEURO: Denies paresthesias, weakness  PSYCH: Denies depression, anxiety    VITALS:  T(F): 99.1, Max: 99.1 (22 @ 11:45)  HR: 79  BP: 166/72  RR: 18Vital Signs Last 24 Hrs  T(C): 37.3 (16 Aug 2022 11:45), Max: 37.3 (16 Aug 2022 11:45)  T(F): 99.1 (16 Aug 2022 11:45), Max: 99.1 (16 Aug 2022 11:45)  HR: 79 (16 Aug 2022 11:45) (55 - 82)  BP: 166/72 (16 Aug 2022 11:45) (145/65 - 191/74)  BP(mean): 103 (16 Aug 2022 11:45) (93 - 103)  RR: 18 (16 Aug 2022 11:45) (18 - 18)  SpO2: 93% (16 Aug 2022 11:45) (72% - 97%)    Parameters below as of 16 Aug 2022 11:45  Patient On (Oxygen Delivery Method): room air        PHYSICAL EXAM:  Gen: NAD, resting in bed  HEENT: Normocephalic, atraumatic  Neck: supple, no lymphadenopathy  CV: Regular rate & regular rhythm  Lungs: decreased BS at bases, no fremitus  Abdomen: Soft, BS present  Ext: Warm, well perfused  Neuro: non focal, awake  Skin: no rash, no erythema  Lines: no phlebitis    FH: Non-contributory  Social Hx: Non-contributory    TESTS & MEASUREMENTS:                        9.6    7.58  )-----------( 211      ( 16 Aug 2022 08:59 )             28.5         144  |  110  |  32<H>  ----------------------------<  102<H>  3.7   |  22  |  0.9    Ca    8.8      16 Aug 2022 08:59  Mg     1.7         TPro  5.5<L>  /  Alb  3.5  /  TBili  <0.2  /  DBili  <0.2  /  AST  10  /  ALT  8   /  AlkPhos  43        LIVER FUNCTIONS - ( 16 Aug 2022 08:59 )  Alb: 3.5 g/dL / Pro: 5.5 g/dL / ALK PHOS: 43 U/L / ALT: 8 U/L / AST: 10 U/L / GGT: x           Urinalysis Basic - ( 16 Aug 2022 03:06 )    Color: Light Yellow / Appearance: Clear / S.018 / pH: x  Gluc: x / Ketone: Negative  / Bili: Negative / Urobili: <2 mg/dL   Blood: x / Protein: 30 mg/dL / Nitrite: Negative   Leuk Esterase: Negative / RBC: 2 /HPF / WBC 2 /HPF   Sq Epi: x / Non Sq Epi: 1 /HPF / Bacteria: Negative          Blood Gas Venous - Lactate: 1.20 mmol/L (22 @ 19:01)  Blood Gas Venous - Lactate: 1.20 mmol/L (22 @ 15:53)      INFECTIOUS DISEASES TESTING  Procalcitonin, Serum: 0.22 (22 @ 17:45)  COVID-19 PCR: Detected (22 @ 20:40)      INFLAMMATORY MARKERS  C-Reactive Protein, Serum: 104.5 mg/L (22 @ 21:00)      RADIOLOGY & ADDITIONAL TESTS:  I have personally reviewed the last available Chest xray  CXR      CT      CARDIOLOGY TESTING  12 Lead ECG:   Ventricular Rate 102 BPM    Atrial Rate 102 BPM    P-R Interval 176 ms    QRS Duration 74 ms    Q-T Interval 346 ms    QTC Calculation(Bazett) 450 ms    P Axis 81 degrees    R Axis 55 degrees    T Axis 76 degrees    Diagnosis Line Sinus tachycardia  Possible Left atrial enlargement  Nonspecific ST abnormality  Abnormal ECG    Confirmed by Maddy Ferrara MD ([id number]) on 8/15/2022 8:09:02 AM (22 @ 16:34)  12 Lead ECG:   Ventricular Rate 78 BPM    Atrial Rate 78 BPM    P-R Interval 172 ms    QRS Duration 82 ms    Q-T Interval 376 ms    QTC Calculation(Bazett) 428 ms    P Axis 78 degrees    R Axis 42 degrees    T Axis 66 degrees    Diagnosis Line Normal sinus rhythm  Nonspecific ST abnormality  Abnormal ECG    Confirmed by Mihai Allison ([id number]) on 2022 11:14:42 AM (22 @ 01:14)      MEDICATIONS  aspirin  chewable 81 Oral daily  cholecalciferol 1000 Oral daily  heparin   Injectable 5000 SubCutaneous every 12 hours  labetalol 200 Oral two times a day  NIFEdipine XL 90 Oral daily  remdesivir  IVPB 100 IV Intermittent every 24 hours  remdesivir  IVPB  IV Intermittent   sertraline 100 Oral daily  sodium chloride 0.45% 1000 IV Continuous <Continuous>  sodium zirconium cyclosilicate 5 Oral every 8 hours      WEIGHT  Weight (kg): 59 (22 @ 13:45)  Creatinine, Serum: 0.9 mg/dL (22 @ 08:59)      ANTIBIOTICS:  remdesivir  IVPB 100 milliGRAM(s) IV Intermittent every 24 hours  remdesivir  IVPB   IV Intermittent       All available historical records have been reviewed

## 2022-08-17 ENCOUNTER — TRANSCRIPTION ENCOUNTER (OUTPATIENT)
Age: 87
End: 2022-08-17

## 2022-08-17 VITALS
TEMPERATURE: 99 F | OXYGEN SATURATION: 96 % | RESPIRATION RATE: 18 BRPM | DIASTOLIC BLOOD PRESSURE: 67 MMHG | SYSTOLIC BLOOD PRESSURE: 146 MMHG | HEART RATE: 74 BPM

## 2022-08-17 LAB
ALBUMIN SERPL ELPH-MCNC: 3.4 G/DL — LOW (ref 3.5–5.2)
ALP SERPL-CCNC: 47 U/L — SIGNIFICANT CHANGE UP (ref 30–115)
ALT FLD-CCNC: 10 U/L — SIGNIFICANT CHANGE UP (ref 0–41)
ANION GAP SERPL CALC-SCNC: 10 MMOL/L — SIGNIFICANT CHANGE UP (ref 7–14)
ANION GAP SERPL CALC-SCNC: 10 MMOL/L — SIGNIFICANT CHANGE UP (ref 7–14)
AST SERPL-CCNC: 11 U/L — SIGNIFICANT CHANGE UP (ref 0–41)
BASOPHILS # BLD AUTO: 0.01 K/UL — SIGNIFICANT CHANGE UP (ref 0–0.2)
BASOPHILS # BLD AUTO: 0.01 K/UL — SIGNIFICANT CHANGE UP (ref 0–0.2)
BASOPHILS NFR BLD AUTO: 0.1 % — SIGNIFICANT CHANGE UP (ref 0–1)
BASOPHILS NFR BLD AUTO: 0.1 % — SIGNIFICANT CHANGE UP (ref 0–1)
BILIRUB SERPL-MCNC: 0.2 MG/DL — SIGNIFICANT CHANGE UP (ref 0.2–1.2)
BUN SERPL-MCNC: 20 MG/DL — SIGNIFICANT CHANGE UP (ref 10–20)
BUN SERPL-MCNC: 25 MG/DL — HIGH (ref 10–20)
CALCIUM SERPL-MCNC: 8.2 MG/DL — LOW (ref 8.5–10.1)
CALCIUM SERPL-MCNC: 8.3 MG/DL — LOW (ref 8.5–10.1)
CHLORIDE SERPL-SCNC: 103 MMOL/L — SIGNIFICANT CHANGE UP (ref 98–110)
CHLORIDE SERPL-SCNC: 107 MMOL/L — SIGNIFICANT CHANGE UP (ref 98–110)
CO2 SERPL-SCNC: 25 MMOL/L — SIGNIFICANT CHANGE UP (ref 17–32)
CO2 SERPL-SCNC: 26 MMOL/L — SIGNIFICANT CHANGE UP (ref 17–32)
CREAT SERPL-MCNC: 0.9 MG/DL — SIGNIFICANT CHANGE UP (ref 0.7–1.5)
CREAT SERPL-MCNC: 0.9 MG/DL — SIGNIFICANT CHANGE UP (ref 0.7–1.5)
EGFR: 62 ML/MIN/1.73M2 — SIGNIFICANT CHANGE UP
EGFR: 62 ML/MIN/1.73M2 — SIGNIFICANT CHANGE UP
EOSINOPHIL # BLD AUTO: 0.01 K/UL — SIGNIFICANT CHANGE UP (ref 0–0.7)
EOSINOPHIL # BLD AUTO: 0.03 K/UL — SIGNIFICANT CHANGE UP (ref 0–0.7)
EOSINOPHIL NFR BLD AUTO: 0.1 % — SIGNIFICANT CHANGE UP (ref 0–8)
EOSINOPHIL NFR BLD AUTO: 0.4 % — SIGNIFICANT CHANGE UP (ref 0–8)
GLUCOSE SERPL-MCNC: 116 MG/DL — HIGH (ref 70–99)
GLUCOSE SERPL-MCNC: 90 MG/DL — SIGNIFICANT CHANGE UP (ref 70–99)
HCT VFR BLD CALC: 27.9 % — LOW (ref 37–47)
HCT VFR BLD CALC: 29.2 % — LOW (ref 37–47)
HGB BLD-MCNC: 9.6 G/DL — LOW (ref 12–16)
HGB BLD-MCNC: 9.8 G/DL — LOW (ref 12–16)
IMM GRANULOCYTES NFR BLD AUTO: 1.1 % — HIGH (ref 0.1–0.3)
IMM GRANULOCYTES NFR BLD AUTO: 1.6 % — HIGH (ref 0.1–0.3)
LYMPHOCYTES # BLD AUTO: 0.73 K/UL — LOW (ref 1.2–3.4)
LYMPHOCYTES # BLD AUTO: 0.94 K/UL — LOW (ref 1.2–3.4)
LYMPHOCYTES # BLD AUTO: 12.4 % — LOW (ref 20.5–51.1)
LYMPHOCYTES # BLD AUTO: 9.2 % — LOW (ref 20.5–51.1)
MAGNESIUM SERPL-MCNC: 1.9 MG/DL — SIGNIFICANT CHANGE UP (ref 1.8–2.4)
MAGNESIUM SERPL-MCNC: 2.1 MG/DL — SIGNIFICANT CHANGE UP (ref 1.8–2.4)
MCHC RBC-ENTMCNC: 27.4 PG — SIGNIFICANT CHANGE UP (ref 27–31)
MCHC RBC-ENTMCNC: 28 PG — SIGNIFICANT CHANGE UP (ref 27–31)
MCHC RBC-ENTMCNC: 33.6 G/DL — SIGNIFICANT CHANGE UP (ref 32–37)
MCHC RBC-ENTMCNC: 34.4 G/DL — SIGNIFICANT CHANGE UP (ref 32–37)
MCV RBC AUTO: 81.3 FL — SIGNIFICANT CHANGE UP (ref 81–99)
MCV RBC AUTO: 81.6 FL — SIGNIFICANT CHANGE UP (ref 81–99)
MONOCYTES # BLD AUTO: 0.76 K/UL — HIGH (ref 0.1–0.6)
MONOCYTES # BLD AUTO: 0.85 K/UL — HIGH (ref 0.1–0.6)
MONOCYTES NFR BLD AUTO: 11.2 % — HIGH (ref 1.7–9.3)
MONOCYTES NFR BLD AUTO: 9.6 % — HIGH (ref 1.7–9.3)
NEUTROPHILS # BLD AUTO: 5.61 K/UL — SIGNIFICANT CHANGE UP (ref 1.4–6.5)
NEUTROPHILS # BLD AUTO: 6.32 K/UL — SIGNIFICANT CHANGE UP (ref 1.4–6.5)
NEUTROPHILS NFR BLD AUTO: 74.3 % — SIGNIFICANT CHANGE UP (ref 42.2–75.2)
NEUTROPHILS NFR BLD AUTO: 79.9 % — HIGH (ref 42.2–75.2)
NRBC # BLD: 0 /100 WBCS — SIGNIFICANT CHANGE UP (ref 0–0)
NRBC # BLD: 0 /100 WBCS — SIGNIFICANT CHANGE UP (ref 0–0)
PLATELET # BLD AUTO: 211 K/UL — SIGNIFICANT CHANGE UP (ref 130–400)
PLATELET # BLD AUTO: 230 K/UL — SIGNIFICANT CHANGE UP (ref 130–400)
POTASSIUM SERPL-MCNC: 3.7 MMOL/L — SIGNIFICANT CHANGE UP (ref 3.5–5)
POTASSIUM SERPL-MCNC: 3.8 MMOL/L — SIGNIFICANT CHANGE UP (ref 3.5–5)
POTASSIUM SERPL-SCNC: 3.7 MMOL/L — SIGNIFICANT CHANGE UP (ref 3.5–5)
POTASSIUM SERPL-SCNC: 3.8 MMOL/L — SIGNIFICANT CHANGE UP (ref 3.5–5)
PROT SERPL-MCNC: 5.5 G/DL — LOW (ref 6–8)
RBC # BLD: 3.43 M/UL — LOW (ref 4.2–5.4)
RBC # BLD: 3.58 M/UL — LOW (ref 4.2–5.4)
RBC # FLD: 14 % — SIGNIFICANT CHANGE UP (ref 11.5–14.5)
RBC # FLD: 14.1 % — SIGNIFICANT CHANGE UP (ref 11.5–14.5)
SODIUM SERPL-SCNC: 139 MMOL/L — SIGNIFICANT CHANGE UP (ref 135–146)
SODIUM SERPL-SCNC: 142 MMOL/L — SIGNIFICANT CHANGE UP (ref 135–146)
WBC # BLD: 7.56 K/UL — SIGNIFICANT CHANGE UP (ref 4.8–10.8)
WBC # BLD: 7.92 K/UL — SIGNIFICANT CHANGE UP (ref 4.8–10.8)
WBC # FLD AUTO: 7.56 K/UL — SIGNIFICANT CHANGE UP (ref 4.8–10.8)
WBC # FLD AUTO: 7.92 K/UL — SIGNIFICANT CHANGE UP (ref 4.8–10.8)

## 2022-08-17 PROCEDURE — 99239 HOSP IP/OBS DSCHRG MGMT >30: CPT

## 2022-08-17 RX ORDER — IPRATROPIUM/ALBUTEROL SULFATE 18-103MCG
3 AEROSOL WITH ADAPTER (GRAM) INHALATION
Qty: 0 | Refills: 0 | DISCHARGE
Start: 2022-08-17

## 2022-08-17 RX ORDER — SPIRONOLACTONE 25 MG/1
1 TABLET, FILM COATED ORAL
Qty: 14 | Refills: 0
Start: 2022-08-17 | End: 2022-08-30

## 2022-08-17 RX ADMIN — SERTRALINE 100 MILLIGRAM(S): 25 TABLET, FILM COATED ORAL at 11:33

## 2022-08-17 RX ADMIN — Medication 81 MILLIGRAM(S): at 11:33

## 2022-08-17 RX ADMIN — Medication 90 MILLIGRAM(S): at 05:06

## 2022-08-17 RX ADMIN — Medication 1000 UNIT(S): at 11:33

## 2022-08-17 RX ADMIN — Medication 200 MILLIGRAM(S): at 05:06

## 2022-08-17 RX ADMIN — REMDESIVIR 500 MILLIGRAM(S): 5 INJECTION INTRAVENOUS at 11:18

## 2022-08-17 NOTE — DISCHARGE NOTE NURSING/CASE MANAGEMENT/SOCIAL WORK - HISTORY OF COVID-19 VACCINATION
Here with spouse reporting difficulty getting his words out when he talks. Symptoms started a few weeks ago. Answers yes no questions with out issue at triage.  
MRI/MRA paper work given to spouse to complete prior to studies. Kasandra Silva RN   
Yes

## 2022-08-17 NOTE — DISCHARGE NOTE PROVIDER - CARE PROVIDER_API CALL
Nahum Silva  PEDIATRICS  [address] [place] [id number]  Phone: [phone]  Fax: [phone]  Follow Up Time:

## 2022-08-17 NOTE — DISCHARGE NOTE PROVIDER - NSDCCPCAREPLAN_GEN_ALL_CORE_FT
PRINCIPAL DISCHARGE DIAGNOSIS  Diagnosis: 2019 novel coronavirus disease (COVID-19)  Assessment and Plan of Treatment: quarantine for ten days from date of testing positive      SECONDARY DISCHARGE DIAGNOSES  Diagnosis: Shortness of breath  Assessment and Plan of Treatment:     Diagnosis: MARY (acute kidney injury)  Assessment and Plan of Treatment:     Diagnosis: Hyperkalemia  Assessment and Plan of Treatment: your potassium was high so we stopped two of your antihypertensive medications which cause high potassium. please follow up with your primary care doctor regarding your blood pressure and how to manage it going forward.     PRINCIPAL DISCHARGE DIAGNOSIS  Diagnosis: 2019 novel coronavirus disease (COVID-19)  Assessment and Plan of Treatment: quarantine for ten days from date of testing positive      SECONDARY DISCHARGE DIAGNOSES  Diagnosis: MARY (acute kidney injury)  Assessment and Plan of Treatment: resolved  please repeat your kidney function tests with your PMD to determine if enalapril and spiranolactone are safe to continue taking    Diagnosis: Hyperkalemia  Assessment and Plan of Treatment: your potassium was high so we stopped two of your antihypertensive medications which cause high potassium while you were hospitalized. Please follow up with your primary care doctor regarding your blood work and blood pressure and how to manage it going forward.

## 2022-08-17 NOTE — DISCHARGE NOTE NURSING/CASE MANAGEMENT/SOCIAL WORK - NSDCPEFALRISK_GEN_ALL_CORE
For information on Fall & Injury Prevention, visit: [email or web address] OR  [email or web address] OR  https://www.cdc.gov/steadi/patient.html

## 2022-08-17 NOTE — PROGRESS NOTE ADULT - SUBJECTIVE AND OBJECTIVE BOX
EMILIANO ANAYA  86y Female    CHIEF COMPLAINT:    Patient is a 86y old  Female who presents with a chief complaint of COVID, diarrhea, vomiting, weakness (16 Aug 2022 13:10)    INTERVAL HPI/OVERNIGHT EVENTS:    Patient seen and examined. No acute events overnight. No active complaints   daughter is bedside  pt is sitting in the chair on room air     ROS: All other systems are negative.    Vital Signs:  Vital Signs Last 24 Hrs  T(C): 37.6 (17 Aug 2022 08:13), Max: 37.6 (17 Aug 2022 08:13)  T(F): 99.6 (17 Aug 2022 08:13), Max: 99.6 (17 Aug 2022 08:13)  HR: 85 (17 Aug 2022 08:13) (73 - 85)  BP: 144/67 (17 Aug 2022 08:13) (135/65 - 183/83)  BP(mean): 96 (17 Aug 2022 08:13) (96 - 103)  RR: 20 (17 Aug 2022 08:13) (18 - 20)  SpO2: 91% (17 Aug 2022 08:13) (91% - 98%)    Parameters below as of 17 Aug 2022 08:13  Patient On (Oxygen Delivery Method): room air        PHYSICAL EXAM:    GENERAL:  NAD  SKIN: No rashes or lesions  HEENT: Atraumatic. Normocephalic.   NECK: Supple, No JVD.   PULMONARY: Coarse breath sounds B/L. No wheezing.   CVS: Normal S1, S2. Rate and Rhythm are regular   ABDOMEN/GI: Soft, Nontender, Nondistended   MSK:  No clubbing or cyanosis   NEUROLOGIC: right sided hemiparesis  PSYCH: Awake and alert     Consultant(s) Notes Reviewed:  [x ] YES  [ ] NO  Care Discussed with Consultants/Other Providers [ x] YES  [ ] NO    LABS:                        9.6    7.56  )-----------( 211      ( 17 Aug 2022 01:24 )             27.9     08-17    142  |  107  |  25<H>  ----------------------------<  90  3.8   |  25  |  0.9    Ca    8.2<L>      17 Aug 2022 01:24  Mg     2.1     08-17    TPro  5.5<L>  /  Alb  3.5  /  TBili  <0.2  /  DBili  <0.2  /  AST  10  /  ALT  8   /  AlkPhos  43  08-16      RADIOLOGY & ADDITIONAL TESTS:      Imaging or report Personally Reviewed:  [x] YES  [ ] NO  EKG reviewed: [x] YES  [ ] NO    Medications:  Standing  MEDICATIONS  (STANDING):  aspirin  chewable 81 milliGRAM(s) Oral daily  cholecalciferol 1000 Unit(s) Oral daily  heparin   Injectable 5000 Unit(s) SubCutaneous every 12 hours  labetalol 200 milliGRAM(s) Oral two times a day  NIFEdipine XL 90 milliGRAM(s) Oral daily  remdesivir  IVPB 100 milliGRAM(s) IV Intermittent every 24 hours  remdesivir  IVPB   IV Intermittent   sertraline 100 milliGRAM(s) Oral daily  silver nitrate Applicator 1 Application(s) Topical once  sodium chloride 0.45% 1000 milliLiter(s) (75 mL/Hr) IV Continuous <Continuous>    MEDICATIONS  (PRN):  acetaminophen     Tablet .. 650 milliGRAM(s) Oral every 4 hours PRN Temp greater or equal to 38.5C (101.3F)  albuterol/ipratropium for Nebulization 3 milliLiter(s) Nebulizer every 6 hours PRN Shortness of Breath and/or Wheezing  benzonatate 100 milliGRAM(s) Oral three times a day PRN Cough

## 2022-08-17 NOTE — DISCHARGE NOTE PROVIDER - NSDCMRMEDTOKEN_GEN_ALL_CORE_FT
aspirin 81 mg oral tablet, chewable: 1 tab(s) orally once a day  enalapril 2.5 mg oral tablet: 1 tab(s) orally once a day at noon  labetalol 200 mg oral tablet: 1 tab(s) orally 2 times a day  NIFEdipine 90 mg oral tablet, extended release: 1 tab(s) orally once a day  sertraline 100 mg oral tablet: 1 tab(s) orally once a day  spironolactone 25 mg oral tablet: 1 tab(s) orally once a day  Vitamin D3 25 mcg (1000 intl units) oral tablet, chewable: 1 tab(s) orally once a day   aspirin 81 mg oral tablet, chewable: 1 tab(s) orally once a day  ipratropium-albuterol 0.5 mg-2.5 mg/3 mL inhalation solution: 3 milliliter(s) inhaled every 6 hours, As needed, Shortness of Breath and/or Wheezing  labetalol 200 mg oral tablet: 1 tab(s) orally 2 times a day  NIFEdipine 90 mg oral tablet, extended release: 1 tab(s) orally once a day  sertraline 100 mg oral tablet: 1 tab(s) orally once a day  Vitamin D3 25 mcg (1000 intl units) oral tablet, chewable: 1 tab(s) orally once a day   aspirin 81 mg oral tablet, chewable: 1 tab(s) orally once a day  enalapril 2.5 mg oral tablet: 1 tab(s) orally once a day at noon  ipratropium-albuterol 0.5 mg-2.5 mg/3 mL inhalation solution: 3 milliliter(s) inhaled every 6 hours, As needed, Shortness of Breath and/or Wheezing  labetalol 200 mg oral tablet: 1 tab(s) orally 2 times a day  NIFEdipine 90 mg oral tablet, extended release: 1 tab(s) orally once a day  sertraline 100 mg oral tablet: 1 tab(s) orally once a day  spironolactone 25 mg oral tablet: 1 tab(s) orally once a day  Vitamin D3 25 mcg (1000 intl units) oral tablet, chewable: 1 tab(s) orally once a day

## 2022-08-17 NOTE — DISCHARGE NOTE PROVIDER - HOSPITAL COURSE
86y  F with PMH of HTN, CVA with Rt residual deficit, HLD, COPD not on home O2, CHF presented to the ED for recent COVID diagnosis, worsening shortness of breath and diarrhea. Patient was given Paxclovid with outpatient but for the past 24 to 48 hours has had worsening weakness, diarrhea, cough and shortness of breath.  Found to be hypoxic to 80s in the ED.     Pt admitted under diagnosAcute Hypoxemic respiratory failure secondary to severe COVID  COPD, not on home oxygen  Intolerance to PAxlovid  saturating 80s in ED triage, 90 % at rest, and 86% on exertion on room air today  setting up home 02  c/w RDV. S/p steroids  per daughter, patient mostly bedbound    MARY, resolved  Hyperkalemia - resolved  - c/w IVF, monitor BMP  - on lokelma, D/C if K<4.5  - spironolactone, enalapril on hold for now. Resume once renal fxn stable     CVA with mild right side weakness - c/w aspirin    HTN - c/w labetalol and nifedipine  - spironolactone, enalapril on hold for MARY    #Progress Note Handoff  Pending (specify):  labs, monitor 02 sats   Family discussion: Plan of care discussed with patient and daughter at bedside, aware and agreeable   Disposition:  from home    86y  F with PMH of HTN, CVA with Rt residual deficit, HLD, COPD not on home O2, CHF presented to the ED for recent COVID diagnosis, worsening shortness of breath and diarrhea. Patient was given Paxclovid with outpatient but for the past 24 to 48 hours has had worsening weakness, diarrhea, cough and shortness of breath.  Found to be hypoxic to 80s in the ED.     Pt admitted under diagnos of Acute Hypoxemic respiratory failure secondary to severe COVID. She was started on remdesivir and steroids. She also had hyperkalemia which resolved. Respiratory status improved progressively and saturating 90% on RA.      86y  F with PMH of HTN, CVA with Rt residual deficit, HLD, COPD not on home O2, CHF presented to the ED for recent COVID diagnosis, worsening shortness of breath and diarrhea. Patient was given Paxclovid with outpatient but for the past 24 to 48 hours has had worsening weakness, diarrhea, cough and shortness of breath.  Found to be hypoxic to 80s in the ED.     Pt admitted under diagnos of Acute Hypoxemic respiratory failure secondary to severe COVID. She was started on remdesivir and steroids. She also had hyperkalemia which resolved. Respiratory status improved progressively and saturating 90% on RA. Her diarrhea resolved and is attributed to paxclovid.     86y  F with PMH of HTN, CVA with Rt residual deficit, HLD, COPD not on home O2, CHF presented to the ED for recent COVID diagnosis, worsening shortness of breath and diarrhea. Patient was given Paxclovid with outpatient but for the past 24 to 48 hours has had worsening weakness, diarrhea, cough and shortness of breath.  Found to be hypoxic to 80s in the ED.     Pt admitted under diagnos of Acute Hypoxemic respiratory failure secondary to severe COVID. She was started on remdesivir and steroids. She also had hyperkalemia which resolved. Respiratory status improved progressively and saturating 90% on RA. Her diarrhea resolved and is attributed to paxlovid.  She is going home with close pulmonary follow up and on home oxygen   Patient completed five days of RDV

## 2022-08-17 NOTE — PROGRESS NOTE ADULT - PROVIDER SPECIALTY LIST ADULT
Hospitalist
Pulmonology
Internal Medicine
Internal Medicine
Nephrology
Critical Care
Hospitalist
Internal Medicine
Infectious Disease

## 2022-08-17 NOTE — DISCHARGE NOTE NURSING/CASE MANAGEMENT/SOCIAL WORK - PATIENT PORTAL LINK FT
You can access the FollowMyHealth Patient Portal offered by [facility] by registering at the following website: [email or web address]. By joining [employer]’s FollowMyHealth portal, you will also be able to view your health information using other applications (apps) compatible with our system.

## 2022-08-17 NOTE — PROGRESS NOTE ADULT - ASSESSMENT
86y  F with PMH of HTN, CVA with Rt residual deficit, HLD, COPD not on home O2, CHF presented to the ED for recent COVID diagnosis, worsening shortness of breath and diarrhea. Patient was given Paxclovid with outpatient but for the past 24 to 48 hours has had worsening weakness, diarrhea, cough and shortness of breath.  Found to be hypoxic to 80s in the ED    Acute Hypoxemic respiratory failure secondary to severe COVID - resolved  COPD, not on home oxygen  saturating low 90's at rest this morning  await home 02 to be set up  continue remdesivir   off dexa    MARY -resolved  Hyperkalemia - resolved  - s/p IVF and lokelma  - spironolactone, enalapril on hold    CVA with mild right side weakness   - c/w aspirin    HTN - c/w labetalol and nifedipine  - spironolactone, enalapril on hold    Progress Note Handoff  Pending Consults: none  Pending Tests: none  Pending Results: none  Family Discussion: discussed oxygen set up, medication and overall plan of care with pt, her daughter and medical staff. All questions answered. dc home today once home oxygen is arranged   Disposition: Home___x__/SNF______/Other_____/Unknown at this time_____  Spent over 35 min reviewing chart and on coordinating patient care during interdisciplinary rounds

## 2022-08-23 DIAGNOSIS — I95.9 HYPOTENSION, UNSPECIFIED: ICD-10-CM

## 2022-08-23 DIAGNOSIS — J12.82 PNEUMONIA DUE TO CORONAVIRUS DISEASE 2019: ICD-10-CM

## 2022-08-23 DIAGNOSIS — I65.03 OCCLUSION AND STENOSIS OF BILATERAL VERTEBRAL ARTERIES: ICD-10-CM

## 2022-08-23 DIAGNOSIS — Z87.891 PERSONAL HISTORY OF NICOTINE DEPENDENCE: ICD-10-CM

## 2022-08-23 DIAGNOSIS — Z88.2 ALLERGY STATUS TO SULFONAMIDES: ICD-10-CM

## 2022-08-23 DIAGNOSIS — J44.1 CHRONIC OBSTRUCTIVE PULMONARY DISEASE WITH (ACUTE) EXACERBATION: ICD-10-CM

## 2022-08-23 DIAGNOSIS — K52.1 TOXIC GASTROENTERITIS AND COLITIS: ICD-10-CM

## 2022-08-23 DIAGNOSIS — U07.1 COVID-19: ICD-10-CM

## 2022-08-23 DIAGNOSIS — E87.2 ACIDOSIS: ICD-10-CM

## 2022-08-23 DIAGNOSIS — Z88.6 ALLERGY STATUS TO ANALGESIC AGENT: ICD-10-CM

## 2022-08-23 DIAGNOSIS — E87.5 HYPERKALEMIA: ICD-10-CM

## 2022-08-23 DIAGNOSIS — J96.01 ACUTE RESPIRATORY FAILURE WITH HYPOXIA: ICD-10-CM

## 2022-08-23 DIAGNOSIS — E78.5 HYPERLIPIDEMIA, UNSPECIFIED: ICD-10-CM

## 2022-08-23 DIAGNOSIS — I69.951 HEMIPLEGIA AND HEMIPARESIS FOLLOWING UNSPECIFIED CEREBROVASCULAR DISEASE AFFECTING RIGHT DOMINANT SIDE: ICD-10-CM

## 2022-08-23 DIAGNOSIS — I50.9 HEART FAILURE, UNSPECIFIED: ICD-10-CM

## 2022-08-23 DIAGNOSIS — N17.0 ACUTE KIDNEY FAILURE WITH TUBULAR NECROSIS: ICD-10-CM

## 2022-08-23 DIAGNOSIS — J44.0 CHRONIC OBSTRUCTIVE PULMONARY DISEASE WITH (ACUTE) LOWER RESPIRATORY INFECTION: ICD-10-CM

## 2022-08-23 DIAGNOSIS — I11.0 HYPERTENSIVE HEART DISEASE WITH HEART FAILURE: ICD-10-CM

## 2022-08-23 DIAGNOSIS — T37.5X5A ADVERSE EFFECT OF ANTIVIRAL DRUGS, INITIAL ENCOUNTER: ICD-10-CM

## 2022-08-24 ENCOUNTER — INPATIENT (INPATIENT)
Facility: HOSPITAL | Age: 87
LOS: 2 days | Discharge: HOME HEALTH | End: 2022-08-27
Attending: HOSPITALIST | Admitting: HOSPITALIST

## 2022-08-24 VITALS
DIASTOLIC BLOOD PRESSURE: 62 MMHG | RESPIRATION RATE: 16 BRPM | TEMPERATURE: 98 F | HEART RATE: 75 BPM | HEIGHT: 67 IN | SYSTOLIC BLOOD PRESSURE: 128 MMHG | WEIGHT: 134.92 LBS | OXYGEN SATURATION: 86 %

## 2022-08-24 LAB
ALBUMIN SERPL ELPH-MCNC: 3.5 G/DL — SIGNIFICANT CHANGE UP (ref 3.5–5.2)
ALP SERPL-CCNC: 57 U/L — SIGNIFICANT CHANGE UP (ref 30–115)
ALT FLD-CCNC: 10 U/L — SIGNIFICANT CHANGE UP (ref 0–41)
ANION GAP SERPL CALC-SCNC: 11 MMOL/L — SIGNIFICANT CHANGE UP (ref 7–14)
APTT BLD: 27.8 SEC — SIGNIFICANT CHANGE UP (ref 27–39.2)
AST SERPL-CCNC: 9 U/L — SIGNIFICANT CHANGE UP (ref 0–41)
BASOPHILS # BLD AUTO: 0.03 K/UL — SIGNIFICANT CHANGE UP (ref 0–0.2)
BASOPHILS NFR BLD AUTO: 0.4 % — SIGNIFICANT CHANGE UP (ref 0–1)
BILIRUB SERPL-MCNC: 0.2 MG/DL — SIGNIFICANT CHANGE UP (ref 0.2–1.2)
BUN SERPL-MCNC: 24 MG/DL — HIGH (ref 10–20)
CALCIUM SERPL-MCNC: 9.1 MG/DL — SIGNIFICANT CHANGE UP (ref 8.5–10.1)
CHLORIDE SERPL-SCNC: 107 MMOL/L — SIGNIFICANT CHANGE UP (ref 98–110)
CO2 SERPL-SCNC: 25 MMOL/L — SIGNIFICANT CHANGE UP (ref 17–32)
CREAT SERPL-MCNC: 1 MG/DL — SIGNIFICANT CHANGE UP (ref 0.7–1.5)
EGFR: 55 ML/MIN/1.73M2 — LOW
EOSINOPHIL # BLD AUTO: 0.1 K/UL — SIGNIFICANT CHANGE UP (ref 0–0.7)
EOSINOPHIL NFR BLD AUTO: 1.2 % — SIGNIFICANT CHANGE UP (ref 0–8)
GLUCOSE SERPL-MCNC: 131 MG/DL — HIGH (ref 70–99)
HCT VFR BLD CALC: 27.4 % — LOW (ref 37–47)
HGB BLD-MCNC: 8.8 G/DL — LOW (ref 12–16)
IMM GRANULOCYTES NFR BLD AUTO: 0.7 % — HIGH (ref 0.1–0.3)
INR BLD: 1.06 RATIO — SIGNIFICANT CHANGE UP (ref 0.65–1.3)
LYMPHOCYTES # BLD AUTO: 0.79 K/UL — LOW (ref 1.2–3.4)
LYMPHOCYTES # BLD AUTO: 9.4 % — LOW (ref 20.5–51.1)
MAGNESIUM SERPL-MCNC: 2 MG/DL — SIGNIFICANT CHANGE UP (ref 1.8–2.4)
MCHC RBC-ENTMCNC: 26.7 PG — LOW (ref 27–31)
MCHC RBC-ENTMCNC: 32.1 G/DL — SIGNIFICANT CHANGE UP (ref 32–37)
MCV RBC AUTO: 83.3 FL — SIGNIFICANT CHANGE UP (ref 81–99)
MONOCYTES # BLD AUTO: 0.49 K/UL — SIGNIFICANT CHANGE UP (ref 0.1–0.6)
MONOCYTES NFR BLD AUTO: 5.8 % — SIGNIFICANT CHANGE UP (ref 1.7–9.3)
NEUTROPHILS # BLD AUTO: 6.91 K/UL — HIGH (ref 1.4–6.5)
NEUTROPHILS NFR BLD AUTO: 82.5 % — HIGH (ref 42.2–75.2)
NRBC # BLD: 0 /100 WBCS — SIGNIFICANT CHANGE UP (ref 0–0)
NT-PROBNP SERPL-SCNC: 2017 PG/ML — HIGH (ref 0–300)
PLATELET # BLD AUTO: 346 K/UL — SIGNIFICANT CHANGE UP (ref 130–400)
POTASSIUM SERPL-MCNC: 4.5 MMOL/L — SIGNIFICANT CHANGE UP (ref 3.5–5)
POTASSIUM SERPL-SCNC: 4.5 MMOL/L — SIGNIFICANT CHANGE UP (ref 3.5–5)
PROT SERPL-MCNC: 5.8 G/DL — LOW (ref 6–8)
PROTHROM AB SERPL-ACNC: 12.2 SEC — SIGNIFICANT CHANGE UP (ref 9.95–12.87)
RBC # BLD: 3.29 M/UL — LOW (ref 4.2–5.4)
RBC # FLD: 14 % — SIGNIFICANT CHANGE UP (ref 11.5–14.5)
SODIUM SERPL-SCNC: 143 MMOL/L — SIGNIFICANT CHANGE UP (ref 135–146)
TROPONIN T SERPL-MCNC: <0.01 NG/ML — SIGNIFICANT CHANGE UP
WBC # BLD: 8.38 K/UL — SIGNIFICANT CHANGE UP (ref 4.8–10.8)
WBC # FLD AUTO: 8.38 K/UL — SIGNIFICANT CHANGE UP (ref 4.8–10.8)

## 2022-08-24 PROCEDURE — 99285 EMERGENCY DEPT VISIT HI MDM: CPT | Mod: FS,CS

## 2022-08-24 PROCEDURE — 71045 X-RAY EXAM CHEST 1 VIEW: CPT | Mod: 26

## 2022-08-24 PROCEDURE — 93970 EXTREMITY STUDY: CPT | Mod: 26

## 2022-08-24 PROCEDURE — 71275 CT ANGIOGRAPHY CHEST: CPT | Mod: 26,MA

## 2022-08-24 PROCEDURE — 93010 ELECTROCARDIOGRAM REPORT: CPT

## 2022-08-24 NOTE — ED ADULT NURSE NOTE - OBJECTIVE STATEMENT
Pt came c/o SOB and low SPO2 - 80's % on exertion when using the commode. Pt also c/o right foot swelling for the past day, denies pain or trauma to the foot.  Pt was just discharged from the hospital where she was admitted for symptomatic COVID and was discharged home on oxygen as needed. Pt denies chest pain, abdominal pain, dizziness, has right sided weakness from old stroke.

## 2022-08-24 NOTE — ED PROVIDER NOTE - NS ED ROS FT
Review of Systems:  	•	CONSTITUTIONAL - no fever, no diaphoresis, no chills  	•	SKIN - no rash  	•	HEMATOLOGIC - no bleeding, no bruising  	•	EYES - no eye pain, no blurry vision  	•	ENT - no congestion  	•	RESPIRATORY - + shortness of breath, + cough  	•	CARDIAC - no chest pain, no palpitations  	•	GI - no abd pain, no nausea, no vomiting, no diarrhea, no constipation  	•	GENITO-URINARY - no dysuria; no hematuria, no increased urinary frequency  	•	MUSCULOSKELETAL - no joint paint, no swelling, no redness  	•	NEUROLOGIC - no weakness, no headache, no paresthesias, no LOC  	•	PSYCH - no anxiety, no depression  	All other ROS are negative except as documented in HPI.

## 2022-08-24 NOTE — ED PROVIDER NOTE - NS ED ATTENDING STATEMENT MOD
This was a shared visit with the KACI. I reviewed and verified the documentation and independently performed the documented:

## 2022-08-24 NOTE — ED ADULT NURSE REASSESSMENT NOTE - NS ED NURSE REASSESS COMMENT FT1
Assumed care from previous nurse Kyra c/o SOB with exertion at home , HX = COVID , , low O2 sat at home , right foot swelling , recent hospital admission for COVID . Pt noted no respiratory distress , resting comfortably on bed , pts daughter at the bedside , sat 98 % on 2 L NC , no accessory muscles used , on isolation precaution status , fall alarm on , provided safety and comfort , call light within reached ,, AO x 3 , awaiting for CTA final read .

## 2022-08-24 NOTE — ED PROVIDER NOTE - PROGRESS NOTE DETAILS
Pt s/o to Dr. Snell to f/u CTA and LE duplex. Pt s/o to me by Dr. Tejada to follow up imaging, reassess and dispo.

## 2022-08-24 NOTE — ED PROVIDER NOTE - CLINICAL SUMMARY MEDICAL DECISION MAKING FREE TEXT BOX
Patient evaluated for exertional shortness of breath, CTA without evidence PE however patient with persistent shortness of breath, admitted for further evaluation and treatment.

## 2022-08-24 NOTE — ED PROVIDER NOTE - ATTENDING APP SHARED VISIT CONTRIBUTION OF CARE
85 y/o female with h/o HTN, HLD, CVA, COPD, recently admitted for COVID with hypoxia/MARY/hyperkalemia after taking 3 doses of paxlovid at home.  Pt was treated with remdesivir and decadron, MARY resolved, was d/c'd home 1 week ago with oxygen concentrator.  Per pt/daughter, pt's O2 sat's have been ~ 90-93%, pt has been having persistent cough,  Yesterday and today, pt developed some SOB during exertion when using the commode.  Daughter checked O2 sat and was high 80's, but jumped back up to 90's right away.  Denies any CP.  has also developed swelling to R foot for the past day.  no calf pain/swelling.  no trauma to area.  no abd pain.  no n/v/d. no ha/dizziness/loc.  no f/c.    PE - nad, nc/at, eomi, perrl, op- clear, mmm, neck supple, no resp distress, + normal WOB, b/l rhonchi, rrr, abd- soft, nt/nd, nabs, from x 4, + swelling to R foot, no tenderness, no erythema, 2+ dp pulse, A&O x 3, no focal neuro deficits.  -check labs, CTA chest, LE duplex.

## 2022-08-24 NOTE — ED PROVIDER NOTE - OBJECTIVE STATEMENT
87 yo F with pmhx of COPD, CVA, HTN, HLD, recent COVID infection presenting for evaluation of shortness of breath. Patient's daughter noted her oxygen saturation to be in the 80s. Symptoms are worse with exertion. Symptoms were better with oxygen. No cp, fever, chills, abdominal pain, nausea, vomiting, diarrhea, back pain, urinary symptoms, headache, dizziness, paresthesias, or weakness.

## 2022-08-24 NOTE — ED PROVIDER NOTE - PHYSICAL EXAMINATION
VITAL SIGNS: I have reviewed nursing notes and confirm.  CONSTITUTIONAL: Patient is in no acute distress.  SKIN: Skin exam is warm and dry, no acute rash.  HEAD: Normocephalic; atraumatic.  EYES: PERRL, EOM intact; conjunctiva and sclera clear.  ENT: No nasal discharge; airway clear.   NECK: Supple; non tender.  CARD: S1, S2 normal; no murmurs, gallops, or rubs. Regular rate and rhythm.  RESP: +Bilateral rhonchi. No acute respiratory distress.   ABD: Normal bowel sounds; soft; non-distended; non-tender.   EXT: Normal ROM. No edema.  LYMPH: No acute cervical adenopathy.  NEURO: Alert, oriented. Grossly unremarkable. No focal deficits.  PSYCH: Cooperative, appropriate.

## 2022-08-24 NOTE — ED ADULT NURSE NOTE - CHPI ED NUR SYMPTOMS NEG
no body aches/no chest pain/no chills/no cough/no diaphoresis/no fever/no headache/no hemoptysis/no shortness of breath/no wheezing

## 2022-08-25 LAB
ANION GAP SERPL CALC-SCNC: 7 MMOL/L — SIGNIFICANT CHANGE UP (ref 7–14)
BASOPHILS # BLD AUTO: 0.02 K/UL — SIGNIFICANT CHANGE UP (ref 0–0.2)
BASOPHILS NFR BLD AUTO: 0.3 % — SIGNIFICANT CHANGE UP (ref 0–1)
BUN SERPL-MCNC: 16 MG/DL — SIGNIFICANT CHANGE UP (ref 10–20)
CALCIUM SERPL-MCNC: 8.6 MG/DL — SIGNIFICANT CHANGE UP (ref 8.5–10.1)
CHLORIDE SERPL-SCNC: 110 MMOL/L — SIGNIFICANT CHANGE UP (ref 98–110)
CO2 SERPL-SCNC: 26 MMOL/L — SIGNIFICANT CHANGE UP (ref 17–32)
CREAT SERPL-MCNC: 0.8 MG/DL — SIGNIFICANT CHANGE UP (ref 0.7–1.5)
EGFR: 72 ML/MIN/1.73M2 — SIGNIFICANT CHANGE UP
EOSINOPHIL # BLD AUTO: 0.11 K/UL — SIGNIFICANT CHANGE UP (ref 0–0.7)
EOSINOPHIL NFR BLD AUTO: 1.4 % — SIGNIFICANT CHANGE UP (ref 0–8)
GLUCOSE SERPL-MCNC: 126 MG/DL — HIGH (ref 70–99)
HCT VFR BLD CALC: 25.5 % — LOW (ref 37–47)
HGB BLD-MCNC: 8.4 G/DL — LOW (ref 12–16)
IMM GRANULOCYTES NFR BLD AUTO: 0.6 % — HIGH (ref 0.1–0.3)
LYMPHOCYTES # BLD AUTO: 0.58 K/UL — LOW (ref 1.2–3.4)
LYMPHOCYTES # BLD AUTO: 7.5 % — LOW (ref 20.5–51.1)
MAGNESIUM SERPL-MCNC: 1.9 MG/DL — SIGNIFICANT CHANGE UP (ref 1.8–2.4)
MCHC RBC-ENTMCNC: 27.5 PG — SIGNIFICANT CHANGE UP (ref 27–31)
MCHC RBC-ENTMCNC: 32.9 G/DL — SIGNIFICANT CHANGE UP (ref 32–37)
MCV RBC AUTO: 83.3 FL — SIGNIFICANT CHANGE UP (ref 81–99)
MONOCYTES # BLD AUTO: 0.48 K/UL — SIGNIFICANT CHANGE UP (ref 0.1–0.6)
MONOCYTES NFR BLD AUTO: 6.2 % — SIGNIFICANT CHANGE UP (ref 1.7–9.3)
MRSA PCR RESULT.: NEGATIVE — SIGNIFICANT CHANGE UP
NEUTROPHILS # BLD AUTO: 6.51 K/UL — HIGH (ref 1.4–6.5)
NEUTROPHILS NFR BLD AUTO: 84 % — HIGH (ref 42.2–75.2)
NRBC # BLD: 0 /100 WBCS — SIGNIFICANT CHANGE UP (ref 0–0)
PLATELET # BLD AUTO: 324 K/UL — SIGNIFICANT CHANGE UP (ref 130–400)
POTASSIUM SERPL-MCNC: 4.6 MMOL/L — SIGNIFICANT CHANGE UP (ref 3.5–5)
POTASSIUM SERPL-SCNC: 4.6 MMOL/L — SIGNIFICANT CHANGE UP (ref 3.5–5)
RBC # BLD: 3.06 M/UL — LOW (ref 4.2–5.4)
RBC # FLD: 13.9 % — SIGNIFICANT CHANGE UP (ref 11.5–14.5)
SARS-COV-2 RNA SPEC QL NAA+PROBE: DETECTED
SODIUM SERPL-SCNC: 143 MMOL/L — SIGNIFICANT CHANGE UP (ref 135–146)
WBC # BLD: 7.75 K/UL — SIGNIFICANT CHANGE UP (ref 4.8–10.8)
WBC # FLD AUTO: 7.75 K/UL — SIGNIFICANT CHANGE UP (ref 4.8–10.8)

## 2022-08-25 PROCEDURE — 99222 1ST HOSP IP/OBS MODERATE 55: CPT

## 2022-08-25 RX ORDER — AZITHROMYCIN 500 MG/1
500 TABLET, FILM COATED ORAL EVERY 24 HOURS
Refills: 0 | Status: DISCONTINUED | OUTPATIENT
Start: 2022-08-25 | End: 2022-08-27

## 2022-08-25 RX ORDER — ALBUTEROL 90 UG/1
2 AEROSOL, METERED ORAL EVERY 6 HOURS
Refills: 0 | Status: DISCONTINUED | OUTPATIENT
Start: 2022-08-25 | End: 2022-08-27

## 2022-08-25 RX ORDER — SPIRONOLACTONE 25 MG/1
25 TABLET, FILM COATED ORAL DAILY
Refills: 0 | Status: DISCONTINUED | OUTPATIENT
Start: 2022-08-25 | End: 2022-08-27

## 2022-08-25 RX ORDER — ENOXAPARIN SODIUM 100 MG/ML
40 INJECTION SUBCUTANEOUS EVERY 24 HOURS
Refills: 0 | Status: DISCONTINUED | OUTPATIENT
Start: 2022-08-25 | End: 2022-08-27

## 2022-08-25 RX ORDER — TIOTROPIUM BROMIDE 18 UG/1
1 CAPSULE ORAL; RESPIRATORY (INHALATION) DAILY
Refills: 0 | Status: DISCONTINUED | OUTPATIENT
Start: 2022-08-25 | End: 2022-08-27

## 2022-08-25 RX ORDER — LABETALOL HCL 100 MG
200 TABLET ORAL
Refills: 0 | Status: DISCONTINUED | OUTPATIENT
Start: 2022-08-25 | End: 2022-08-27

## 2022-08-25 RX ORDER — SERTRALINE 25 MG/1
100 TABLET, FILM COATED ORAL DAILY
Refills: 0 | Status: DISCONTINUED | OUTPATIENT
Start: 2022-08-25 | End: 2022-08-27

## 2022-08-25 RX ORDER — ASPIRIN/CALCIUM CARB/MAGNESIUM 324 MG
81 TABLET ORAL DAILY
Refills: 0 | Status: DISCONTINUED | OUTPATIENT
Start: 2022-08-25 | End: 2022-08-27

## 2022-08-25 RX ORDER — NIFEDIPINE 30 MG
90 TABLET, EXTENDED RELEASE 24 HR ORAL DAILY
Refills: 0 | Status: DISCONTINUED | OUTPATIENT
Start: 2022-08-25 | End: 2022-08-27

## 2022-08-25 RX ORDER — AZITHROMYCIN 500 MG/1
500 TABLET, FILM COATED ORAL ONCE
Refills: 0 | Status: COMPLETED | OUTPATIENT
Start: 2022-08-25 | End: 2022-08-25

## 2022-08-25 RX ORDER — CHOLECALCIFEROL (VITAMIN D3) 125 MCG
1000 CAPSULE ORAL DAILY
Refills: 0 | Status: DISCONTINUED | OUTPATIENT
Start: 2022-08-25 | End: 2022-08-27

## 2022-08-25 RX ORDER — CEFTRIAXONE 500 MG/1
1000 INJECTION, POWDER, FOR SOLUTION INTRAMUSCULAR; INTRAVENOUS EVERY 24 HOURS
Refills: 0 | Status: DISCONTINUED | OUTPATIENT
Start: 2022-08-25 | End: 2022-08-27

## 2022-08-25 RX ORDER — SPIRONOLACTONE 25 MG/1
50 TABLET, FILM COATED ORAL DAILY
Refills: 0 | Status: DISCONTINUED | OUTPATIENT
Start: 2022-08-25 | End: 2022-08-25

## 2022-08-25 RX ORDER — SPIRONOLACTONE 25 MG/1
25 TABLET, FILM COATED ORAL DAILY
Refills: 0 | Status: DISCONTINUED | OUTPATIENT
Start: 2022-08-25 | End: 2022-08-25

## 2022-08-25 RX ORDER — CEFTRIAXONE 500 MG/1
1000 INJECTION, POWDER, FOR SOLUTION INTRAMUSCULAR; INTRAVENOUS ONCE
Refills: 0 | Status: COMPLETED | OUTPATIENT
Start: 2022-08-25 | End: 2022-08-25

## 2022-08-25 RX ADMIN — AZITHROMYCIN 500 MILLIGRAM(S): 500 TABLET, FILM COATED ORAL at 04:30

## 2022-08-25 RX ADMIN — SERTRALINE 100 MILLIGRAM(S): 25 TABLET, FILM COATED ORAL at 15:54

## 2022-08-25 RX ADMIN — Medication 2.5 MILLIGRAM(S): at 06:29

## 2022-08-25 RX ADMIN — Medication 90 MILLIGRAM(S): at 06:29

## 2022-08-25 RX ADMIN — Medication 200 MILLIGRAM(S): at 22:01

## 2022-08-25 RX ADMIN — Medication 1000 UNIT(S): at 15:54

## 2022-08-25 RX ADMIN — Medication 200 MILLIGRAM(S): at 06:29

## 2022-08-25 RX ADMIN — CEFTRIAXONE 100 MILLIGRAM(S): 500 INJECTION, POWDER, FOR SOLUTION INTRAMUSCULAR; INTRAVENOUS at 02:00

## 2022-08-25 RX ADMIN — SPIRONOLACTONE 25 MILLIGRAM(S): 25 TABLET, FILM COATED ORAL at 15:54

## 2022-08-25 RX ADMIN — Medication 81 MILLIGRAM(S): at 15:53

## 2022-08-25 RX ADMIN — AZITHROMYCIN 255 MILLIGRAM(S): 500 TABLET, FILM COATED ORAL at 02:00

## 2022-08-25 RX ADMIN — CEFTRIAXONE 1000 MILLIGRAM(S): 500 INJECTION, POWDER, FOR SOLUTION INTRAMUSCULAR; INTRAVENOUS at 04:30

## 2022-08-25 NOTE — H&P ADULT - HISTORY OF PRESENT ILLNESS
87 yo F with PMHx HTN, HLD, CVA, COPD who recently admitted for covid (s/p RDV) discharged last week Wednesday with Home O2 as pt was desatting on ambulation on RA in previous admission. Pt's daughter reports pt has not been using home O2 at all as she was fine on RA at rest and does not usually ambulate due to her CVA history, but for the past 2 days when the daughter tries to move the pt from bed to toilet seat, pt has becoming hypoxic and short of breath and recovers on her own when she rests without any need for O2. Also complains of wet cough with no expectoration. Daughter complains she has been seeing severe pitting edema over her legs which is resolved now. Denies chest pain, abd pain, urinary complaints, nausea and vomiting    In ED, pt vitals are stable except for hypoxia for which she was started on 2L NC (but she was comfortable on RA on my encounter)  Labs did not show any gross derangements compared to previous admission; BNP 2K  CT chest bilateral lower lobe opacities  s/p ceftriaxone and azithromycin in ED  Admitted to medicine for SOB w/u and treatment

## 2022-08-25 NOTE — PATIENT PROFILE ADULT - FALL HARM RISK - HARM RISK INTERVENTIONS

## 2022-08-25 NOTE — ED ADULT NURSE REASSESSMENT NOTE - NS ED NURSE REASSESS COMMENT FT1
Patient and patients family refused another new order of COVID swab and MRSA , pt turned and repositioned

## 2022-08-25 NOTE — H&P ADULT - NSHPLABSRESULTS_GEN_ALL_CORE
(08-24 @ 18:35)                      8.8  8.38 )-----------( 346                 27.4    Neutrophils = 6.91 (82.5%)  Lymphocytes = 0.79 (9.4%)  Eosinophils = 0.10 (1.2%)  Basophils = 0.03 (0.4%)  Monocytes = 0.49 (5.8%)  Bands = --%    08-24    143  |  107  |  24<H>  ----------------------------<  131<H>  4.5   |  25  |  1.0    Ca    9.1      24 Aug 2022 18:35  Mg     2.0     08-24    TPro  5.8<L>  /  Alb  3.5  /  TBili  0.2  /  DBili  x   /  AST  9   /  ALT  10  /  AlkPhos  57  08-24    ( 24 Aug 2022 18:35 )   PT: 12.20 sec;   INR: 1.06 ratio;       PTT:27.8 sec  CARDIAC MARKERS ( 24 Aug 2022 18:35 )  Trop <0.01 ng/mL / CK x     / CKMB x           RVP:          Tox:    IMPRESSION:      Motion limited examination. No central or proximal segmental pulmonary   embolus. More distal branches are obscured secondary to motion    Small left pleural effusion with adjacent left lower lobe consolidative   opacity/atelectasis. Smaller right lower lobe opacity/atelectasis. These   findings are persistent or recurrent compared with CT chest dated   2/26/2020.    New pleural based right upper lobe nodular opacity measuring up to 1.2   cm. Differential includes infectious, inflammatory, neoplastic   etiologies. Consider short-term follow-up posttreatment CT chest for   further evaluation.    --- End of Report --

## 2022-08-25 NOTE — ED ADULT NURSE REASSESSMENT NOTE - NS ED NURSE REASSESS COMMENT FT1
Meds not marked in MAR - Per patient and family member, pt received all nighttime meds except labetalol, which she takes at 9PM.

## 2022-08-25 NOTE — ED ADULT NURSE REASSESSMENT NOTE - NS ED NURSE REASSESS COMMENT FT1
turned and repositioned , pts daughter at the bedside , no accessory muscles used , no respiratory distress noted , IVL intact

## 2022-08-25 NOTE — H&P ADULT - NSHPPHYSICALEXAM_GEN_ALL_CORE
Constitutional; No acute distress  Head: Atraumatic, normocephalic  Eyes: PERRLA; EOMI  Lungs: b/l ronchi +  Heart: S1, S2+; no murmurs  Abd: Soft non tender non distended  Ext: 1+ pitting edema in forefoot  Neuro: AOX4; no focal deficits  Skin: No rashes or lesions T(F): 98.4 (08-25-22 @ 19:08), Max: 98.5 (08-25-22 @ 07:58)  HR: 64 (08-25-22 @ 19:08) (64 - 88)  BP: 155/68 (08-25-22 @ 19:08) (140/64 - 155/68)  RR: 18 (08-25-22 @ 19:08) (16 - 18)  SpO2: 94% (08-25-22 @ 19:08) (94% - 98%)    Constitutional; No acute distress  Head: Atraumatic, normocephalic  Eyes: PERRLA; EOMI  EMNT: Moist oral mucosa, no lesion  Respiratory: b/l ronchi +  Cardiac: S1, S2+; no murmurs  GI: Soft non tender non distended  MSK/Ext: no joint swelling or tenderness, 1+ pitting edema in forefoot  Neuro: AOX4; no focal deficits  Skin: No rashes or lesions  Vascular: Bilateral tibial pulses palpable

## 2022-08-25 NOTE — PATIENT PROFILE ADULT - FUNCTIONAL ASSESSMENT - BASIC MOBILITY 6.
2-calculated by average/Not able to assess (calculate score using [facility] averaging method)

## 2022-08-25 NOTE — H&P ADULT - ATTENDING COMMENTS
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 # Left lower lob pneumonia  # Recent COVID-19, given IV RSVx 3 days  # Acute hypoxemic respiratory failure  - c/w IV rocephin and azithromycin  - oxygen supplementation remains 97% on 2L NC at the time of evaluation, pt was previously discharge home on home oxygen    # Anemia, downtrending Hb, no evidence of bleeding  - monitor CBC  - obtain iron studies and ferritin    # HFpEF, stable  - c/w spironolactone 25mg daily    # HTN  - c/w home enalapril, labetalol and nifedipine    # CVA with residual Rt sided weakness, 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 # Left lower lob pneumonia  # Recent COVID-19, given IV RSVx 3 days  # Acute hypoxemic respiratory failure  - c/w IV rocephin and azithromycin  - oxygen supplementation remains 97% on 2L NC at the time of evaluation, pt was previously discharge home on home oxygen    # Anemia, downtrending Hb, no evidence of bleeding  - monitor CBC  - obtain iron studies and ferritin    # HFpEF, stable  - c/w spironolactone 25mg daily  - EKG reviewed, sinus rhythm with borderline LVH    # HTN  - c/w home enalapril, labetalol and nifedipine    # CVA with residual Rt sided weakness, 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

## 2022-08-26 ENCOUNTER — TRANSCRIPTION ENCOUNTER (OUTPATIENT)
Age: 87
End: 2022-08-26

## 2022-08-26 LAB
ANION GAP SERPL CALC-SCNC: 7 MMOL/L — SIGNIFICANT CHANGE UP (ref 7–14)
BASOPHILS # BLD AUTO: 0.02 K/UL — SIGNIFICANT CHANGE UP (ref 0–0.2)
BASOPHILS NFR BLD AUTO: 0.3 % — SIGNIFICANT CHANGE UP (ref 0–1)
BLD GP AB SCN SERPL QL: SIGNIFICANT CHANGE UP
BUN SERPL-MCNC: 12 MG/DL — SIGNIFICANT CHANGE UP (ref 10–20)
CALCIUM SERPL-MCNC: 8.6 MG/DL — SIGNIFICANT CHANGE UP (ref 8.5–10.1)
CHLORIDE SERPL-SCNC: 107 MMOL/L — SIGNIFICANT CHANGE UP (ref 98–110)
CO2 SERPL-SCNC: 27 MMOL/L — SIGNIFICANT CHANGE UP (ref 17–32)
CREAT SERPL-MCNC: 0.8 MG/DL — SIGNIFICANT CHANGE UP (ref 0.7–1.5)
EGFR: 72 ML/MIN/1.73M2 — SIGNIFICANT CHANGE UP
EOSINOPHIL # BLD AUTO: 0.14 K/UL — SIGNIFICANT CHANGE UP (ref 0–0.7)
EOSINOPHIL NFR BLD AUTO: 2 % — SIGNIFICANT CHANGE UP (ref 0–8)
GLUCOSE SERPL-MCNC: 96 MG/DL — SIGNIFICANT CHANGE UP (ref 70–99)
HCT VFR BLD CALC: 26 % — LOW (ref 37–47)
HGB BLD-MCNC: 8.4 G/DL — LOW (ref 12–16)
IMM GRANULOCYTES NFR BLD AUTO: 0.7 % — HIGH (ref 0.1–0.3)
LEGIONELLA AG UR QL: NEGATIVE — SIGNIFICANT CHANGE UP
LYMPHOCYTES # BLD AUTO: 0.71 K/UL — LOW (ref 1.2–3.4)
LYMPHOCYTES # BLD AUTO: 10.3 % — LOW (ref 20.5–51.1)
MAGNESIUM SERPL-MCNC: 1.9 MG/DL — SIGNIFICANT CHANGE UP (ref 1.8–2.4)
MCHC RBC-ENTMCNC: 27 PG — SIGNIFICANT CHANGE UP (ref 27–31)
MCHC RBC-ENTMCNC: 32.3 G/DL — SIGNIFICANT CHANGE UP (ref 32–37)
MCV RBC AUTO: 83.6 FL — SIGNIFICANT CHANGE UP (ref 81–99)
MONOCYTES # BLD AUTO: 0.44 K/UL — SIGNIFICANT CHANGE UP (ref 0.1–0.6)
MONOCYTES NFR BLD AUTO: 6.4 % — SIGNIFICANT CHANGE UP (ref 1.7–9.3)
MRSA PCR RESULT.: NEGATIVE — SIGNIFICANT CHANGE UP
NEUTROPHILS # BLD AUTO: 5.54 K/UL — SIGNIFICANT CHANGE UP (ref 1.4–6.5)
NEUTROPHILS NFR BLD AUTO: 80.3 % — HIGH (ref 42.2–75.2)
NRBC # BLD: 0 /100 WBCS — SIGNIFICANT CHANGE UP (ref 0–0)
PLATELET # BLD AUTO: 340 K/UL — SIGNIFICANT CHANGE UP (ref 130–400)
POTASSIUM SERPL-MCNC: 4.4 MMOL/L — SIGNIFICANT CHANGE UP (ref 3.5–5)
POTASSIUM SERPL-SCNC: 4.4 MMOL/L — SIGNIFICANT CHANGE UP (ref 3.5–5)
RBC # BLD: 3.11 M/UL — LOW (ref 4.2–5.4)
RBC # FLD: 14.1 % — SIGNIFICANT CHANGE UP (ref 11.5–14.5)
SODIUM SERPL-SCNC: 141 MMOL/L — SIGNIFICANT CHANGE UP (ref 135–146)
WBC # BLD: 6.9 K/UL — SIGNIFICANT CHANGE UP (ref 4.8–10.8)
WBC # FLD AUTO: 6.9 K/UL — SIGNIFICANT CHANGE UP (ref 4.8–10.8)

## 2022-08-26 PROCEDURE — 99232 SBSQ HOSP IP/OBS MODERATE 35: CPT

## 2022-08-26 RX ADMIN — SERTRALINE 100 MILLIGRAM(S): 25 TABLET, FILM COATED ORAL at 12:00

## 2022-08-26 RX ADMIN — SPIRONOLACTONE 25 MILLIGRAM(S): 25 TABLET, FILM COATED ORAL at 06:01

## 2022-08-26 RX ADMIN — AZITHROMYCIN 255 MILLIGRAM(S): 500 TABLET, FILM COATED ORAL at 06:02

## 2022-08-26 RX ADMIN — Medication 2.5 MILLIGRAM(S): at 06:01

## 2022-08-26 RX ADMIN — Medication 200 MILLIGRAM(S): at 18:00

## 2022-08-26 RX ADMIN — Medication 1000 UNIT(S): at 12:00

## 2022-08-26 RX ADMIN — CEFTRIAXONE 100 MILLIGRAM(S): 500 INJECTION, POWDER, FOR SOLUTION INTRAMUSCULAR; INTRAVENOUS at 06:02

## 2022-08-26 RX ADMIN — Medication 200 MILLIGRAM(S): at 06:01

## 2022-08-26 RX ADMIN — ALBUTEROL 2 PUFF(S): 90 AEROSOL, METERED ORAL at 21:56

## 2022-08-26 RX ADMIN — Medication 81 MILLIGRAM(S): at 12:00

## 2022-08-26 RX ADMIN — Medication 90 MILLIGRAM(S): at 06:01

## 2022-08-26 NOTE — DISCHARGE NOTE NURSING/CASE MANAGEMENT/SOCIAL WORK - NSDCPEFALRISK_GEN_ALL_CORE
For information on Fall & Injury Prevention, visit: [email or web address] OR  [email or web address] OR  https://www.cdc.gov/steadi/patient.html

## 2022-08-26 NOTE — SWALLOW BEDSIDE ASSESSMENT ADULT - SWALLOW EVAL: RECOMMENDED FEEDING/EATING TECHNIQUES
[FreeTextEntry1] : We had the pleasure to evaluate BRENDAN ALVAREZ at Maria Fareri Children's Hospital of Holmes County Joel Pomerene oral hygiene/position upright (90 degrees)

## 2022-08-26 NOTE — PROGRESS NOTE ADULT - ASSESSMENT
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

## 2022-08-26 NOTE — PROGRESS NOTE ADULT - SUBJECTIVE AND OBJECTIVE BOX
SUBJECTIVE:  HPI:  87 yo F with PMHx HTN, HLD, CVA, COPD who recently admitted for covid (s/p RDV) discharged last week Wednesday with Home O2 as pt was desatting on ambulation on RA in previous admission. Pt's daughter reports pt has not been using home O2 at all as she was fine on RA at rest and does not usually ambulate due to her CVA history, but for the past 2 days when the daughter tries to move the pt from bed to toilet seat, pt has becoming hypoxic and short of breath and recovers on her own when she rests without any need for O2. Also complains of wet cough with no expectoration. Daughter complains she has been seeing severe pitting edema over her legs which is resolved now. Denies chest pain, abd pain, urinary complaints, nausea and vomiting    In ED, pt vitals are stable except for hypoxia for which she was started on 2L NC (but she was comfortable on RA on my encounter)  Labs did not show any gross derangements compared to previous admission; BNP 2K  CT chest bilateral lower lobe opacities  s/p ceftriaxone and azithromycin in ED  Admitted to medicine for SOB w/u and treatment (25 Aug 2022 05:15)      Patient is a 86y old Female who presents with a chief complaint of SOB (26 Aug 2022 16:19)    Currently admitted to medicine with the primary diagnosis of Shortness of breath       Today is hospital day 1d.     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    No significant past surgical history        ALLERGIES:  Celebrex (Other (Moderate))  sulfonamides (Unknown)    MEDICATIONS:  ACTIVE MEDICATIONS  ALBUTerol    90 MICROgram(s) HFA Inhaler 2 Puff(s) Inhalation every 6 hours  aspirin  chewable 81 milliGRAM(s) Oral daily  azithromycin  IVPB 500 milliGRAM(s) IV Intermittent every 24 hours  cefTRIAXone   IVPB 1000 milliGRAM(s) IV Intermittent every 24 hours  cholecalciferol 1000 Unit(s) Oral daily  enalapril 2.5 milliGRAM(s) Oral daily  enoxaparin Injectable 40 milliGRAM(s) SubCutaneous every 24 hours  labetalol 200 milliGRAM(s) Oral two times a day  NIFEdipine XL 90 milliGRAM(s) Oral daily  sertraline 100 milliGRAM(s) Oral daily  spironolactone 25 milliGRAM(s) Oral daily  tiotropium 18 MICROgram(s) Capsule 1 Capsule(s) Inhalation daily      VITALS:   T(F): 98  HR: 72  BP: 108/54  RR: 18  SpO2: 93%    LABS:                        8.4    6.90  )-----------( 340      ( 26 Aug 2022 08:43 )             26.0     08-26    141  |  107  |  12  ----------------------------<  96  4.4   |  27  |  0.8    Ca    8.6      26 Aug 2022 08:43  Mg     1.9     08-26    TPro  5.8<L>  /  Alb  3.5  /  TBili  0.2  /  DBili  x   /  AST  9   /  ALT  10  /  AlkPhos  57  08-24    PT/INR - ( 24 Aug 2022 18:35 )   PT: 12.20 sec;   INR: 1.06 ratio         PTT - ( 24 Aug 2022 18:35 )  PTT:27.8 sec          CARDIAC MARKERS ( 24 Aug 2022 18:35 )  x     / <0.01 ng/mL / x     / x     / x              PHYSICAL EXAM:  GENERAL: NAD   HEAD:  Atraumatic, Normocephalic  EYES:   conjunctiva and sclera clear  NECK: Supple, No JVD  CHEST/LUNG: Clear to auscultation bilaterally; No wheeze  HEART: Regular rate and rhythm; No murmurs, rubs, or gallops  ABDOMEN: Soft, Nontender, Nondistended; Bowel sounds present  EXTREMITIES:  2+ Peripheral Pulses, No clubbing, cyanosis, or edema  SKIN: No rashes or lesions      ASSESSMENT:  # Left lower lob pneumonia  # Recent COVID-19, given IV RSVx 3 days  # Acute hypoxemic respiratory failure  - c/w IV rocephin and azithromycin  - dc on Augmentin     # Anemia, downtrending Hb, no evidence of bleeding  - monitor CBC    # Chronic HFpEF, stable  - c/w spironolactone 25mg daily    # HTN  - c/w home enalapril, labetalol and nifedipine    # CVA with residual Rt sided weakness, 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          
     Pt seen and examined at bedside. No SOB, CP.         VITAL SIGNS (Last 24 hrs):  T(C): 36.7 (08-26-22 @ 13:39), Max: 36.9 (08-25-22 @ 19:08)  HR: 72 (08-26-22 @ 13:39) (64 - 78)  BP: 108/54 (08-26-22 @ 13:39) (108/54 - 159/70)  RR: 18 (08-26-22 @ 13:39) (18 - 18)  SpO2: 93% (08-26-22 @ 13:39) (93% - 95%)       I&O's Summary      PHYSICAL EXAM:  GENERAL: NAD   HEAD:  Atraumatic, Normocephalic  EYES:   conjunctiva and sclera clear  NECK: Supple, No JVD  CHEST/LUNG: Clear to auscultation bilaterally; No wheeze  HEART: Regular rate and rhythm; No murmurs, rubs, or gallops  ABDOMEN: Soft, Nontender, Nondistended; Bowel sounds present  EXTREMITIES:  2+ Peripheral Pulses, No clubbing, cyanosis, or edema  SKIN: No rashes or lesions    Labs Reviewed       CBC Full  -  ( 26 Aug 2022 08:43 )  WBC Count : 6.90 K/uL  Hemoglobin : 8.4 g/dL  Hematocrit : 26.0 %  Platelet Count - Automated : 340 K/uL  Mean Cell Volume : 83.6 fL  Mean Cell Hemoglobin : 27.0 pg  Mean Cell Hemoglobin Concentration : 32.3 g/dL  Auto Neutrophil # : 5.54 K/uL  Auto Lymphocyte # : 0.71 K/uL  Auto Monocyte # : 0.44 K/uL  Auto Eosinophil # : 0.14 K/uL  Auto Basophil # : 0.02 K/uL  Auto Neutrophil % : 80.3 %  Auto Lymphocyte % : 10.3 %  Auto Monocyte % : 6.4 %  Auto Eosinophil % : 2.0 %  Auto Basophil % : 0.3 %    BMP:    08-26 @ 08:43    Blood Urea Nitrogen - 12  Calcium - 8.6  Carbond Dioxide - 27  Chloride - 107  Creatinine - 0.8  Glucose - 96  Potassium - 4.4  Sodium - 141         PT/INR - ( 24 Aug 2022 18:35 )   PT: 12.20 sec;   INR: 1.06 ratio         PTT - ( 24 Aug 2022 18:35 )  PTT:27.8 sec       MEDICATIONS  (STANDING):  ALBUTerol    90 MICROgram(s) HFA Inhaler 2 Puff(s) Inhalation every 6 hours  aspirin  chewable 81 milliGRAM(s) Oral daily  azithromycin  IVPB 500 milliGRAM(s) IV Intermittent every 24 hours  cefTRIAXone   IVPB 1000 milliGRAM(s) IV Intermittent every 24 hours  cholecalciferol 1000 Unit(s) Oral daily  enalapril 2.5 milliGRAM(s) Oral daily  enoxaparin Injectable 40 milliGRAM(s) SubCutaneous every 24 hours  labetalol 200 milliGRAM(s) Oral two times a day  NIFEdipine XL 90 milliGRAM(s) Oral daily  sertraline 100 milliGRAM(s) Oral daily  spironolactone 25 milliGRAM(s) Oral daily  tiotropium 18 MICROgram(s) Capsule 1 Capsule(s) Inhalation daily    MEDICATIONS  (PRN):

## 2022-08-26 NOTE — DISCHARGE NOTE PROVIDER - NSDCMRMEDTOKEN_GEN_ALL_CORE_FT
aspirin 81 mg oral tablet, chewable: 1 tab(s) orally once a day  enalapril 2.5 mg oral tablet: 1 tab(s) orally once a day at noon  ipratropium-albuterol 0.5 mg-2.5 mg/3 mL inhalation solution: 3 milliliter(s) inhaled every 6 hours, As needed, Shortness of Breath and/or Wheezing  labetalol 200 mg oral tablet: 1 tab(s) orally 2 times a day  NIFEdipine 90 mg oral tablet, extended release: 1 tab(s) orally once a day  sertraline 100 mg oral tablet: 1 tab(s) orally once a day  spironolactone 25 mg oral tablet: 1 tab(s) orally once a day  Vitamin D3 25 mcg (1000 intl units) oral tablet, chewable: 1 tab(s) orally once a day   amoxicillin-clavulanate 875 mg-125 mg oral tablet: 1  orally 2 times a day   aspirin 81 mg oral tablet, chewable: 1 tab(s) orally once a day  enalapril 2.5 mg oral tablet: 1 tab(s) orally once a day at noon  ipratropium-albuterol 0.5 mg-2.5 mg/3 mL inhalation solution: 3 milliliter(s) inhaled every 6 hours, As needed, Shortness of Breath and/or Wheezing  labetalol 200 mg oral tablet: 1 tab(s) orally 2 times a day  NIFEdipine 90 mg oral tablet, extended release: 1 tab(s) orally once a day  sertraline 100 mg oral tablet: 1 tab(s) orally once a day  spironolactone 25 mg oral tablet: 1 tab(s) orally once a day  Vitamin D3 25 mcg (1000 intl units) oral tablet, chewable: 1 tab(s) orally once a day

## 2022-08-26 NOTE — CONSULT NOTE ADULT - ASSESSMENT
ASSESSMENT  85 yo F with PMHx HTN, HLD, CVA, COPD who recently admitted for covid (s/p RDV) discharged last week Wednesday with Home O2 as pt was desatting on ambulation on RA in previous admission.    IMPRESSION  #Recent COVID19 infection, possible superimposed bacterial PNA / aspiration    WBC 7     COVID-19 PCR: Detected (08-25-22 @ 04:00) COVID-19 PCR: Detected (08-13-22 @ 20:40)    Procalcitonin, Serum: 0.22 ng/mL (08-14-22 @ 17:45)  CT Small left pleural effusion with adjacent left lower lobe consolidative   opacity/atelectasis. Smaller right lower lobe opacity/atelectasis. These   findings are persistent or recurrent compared with CT chest dated   2/26/2020.  New pleural based right upper lobe nodular opacity measuring up to 1.2   cm. Differential includes infectious, inflammatory, neoplastic   etiologies. Consider short-term follow-up posttreatment CT chest for   further evaluation.  #Sepsis ruled out on admission   #Abx allergy: Celebrex (Other (Moderate))    Creatinine, Serum: 0.8 (08-26-22 @ 08:43)    Weight (kg): 61.2 (08-24-22 @ 16:16)    RECOMMENDATIONS  - reports significant improvement in cough since antibiotics, ok for po augmentin 875mg BID x 5-7 days  - elevated BNP- may be more so the cause of SOB/ SANON   - procalcitonin     If any questions, please call or send a message on [employer]  Please continue to update ID with any pertinent new laboratory or radiographic findings  Spectra [id number]

## 2022-08-26 NOTE — CONSULT NOTE ADULT - SUBJECTIVE AND OBJECTIVE BOX
EMILIANO ANAYA  86y, Female  Allergy: Celebrex (Other (Moderate))  sulfonamides (Unknown)      CHIEF COMPLAINT:   SOB (26 Aug 2022 11:38)      LOS  1d    HPI  HPI:  85 yo F with PMHx HTN, HLD, CVA, COPD who recently admitted for covid (s/p RDV) discharged last week Wednesday with Home O2 as pt was desatting on ambulation on RA in previous admission. Pt's daughter reports pt has not been using home O2 at all as she was fine on RA at rest and does not usually ambulate due to her CVA history, but for the past 2 days when the daughter tries to move the pt from bed to toilet seat, pt has becoming hypoxic and short of breath and recovers on her own when she rests without any need for O2. Also complains of wet cough with no expectoration. Daughter complains she has been seeing severe pitting edema over her legs which is resolved now. Denies chest pain, abd pain, urinary complaints, nausea and vomiting    In ED, pt vitals are stable except for hypoxia for which she was started on 2L NC (but she was comfortable on RA on my encounter)  Labs did not show any gross derangements compared to previous admission; BNP 2K  CT chest bilateral lower lobe opacities  s/p ceftriaxone and azithromycin in ED  Admitted to medicine for SOB w/u and treatment (25 Aug 2022 05:15)      INFECTIOUS DISEASE HISTORY:  ID consulted for COVID and PNA  recent admission for COVID s/p RDV  now with SOB, weakness and mild cough     PMH  PAST MEDICAL & SURGICAL HISTORY:  HLD (hyperlipidemia)      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      No significant past surgical history          FAMILY HISTORY  No pertinent family history in first degree relatives    FHx: stroke        SOCIAL HISTORY  Social History:  Ex-smoker. No alcohol use. No illicit drug use. (25 Aug 2022 05:15)        ROS  General: Denies rigors, nightsweats  HEENT: Denies headache, rhinorrhea, sore throat, eye pain  CV: Denies CP, palpitations  PULM: as noted above   GI: Denies hematemesis, hematochezia, melena  : Denies discharge, hematuria  MSK: Denies arthralgias, myalgias  SKIN: Denies rash, lesions  NEURO: Denies paresthesias, weakness  PSYCH: Denies depression, anxiety     VITALS:  T(F): 97.6, Max: 98.4 (08-25-22 @ 19:08)  HR: 78  BP: 159/70  RR: 18Vital Signs Last 24 Hrs  T(C): 36.4 (26 Aug 2022 05:28), Max: 36.9 (25 Aug 2022 19:08)  T(F): 97.6 (26 Aug 2022 05:28), Max: 98.4 (25 Aug 2022 19:08)  HR: 78 (26 Aug 2022 05:28) (64 - 78)  BP: 159/70 (26 Aug 2022 05:28) (152/74 - 159/70)  BP(mean): --  RR: 18 (26 Aug 2022 05:28) (18 - 18)  SpO2: 95% (25 Aug 2022 22:03) (94% - 95%)    Parameters below as of 25 Aug 2022 22:03  Patient On (Oxygen Delivery Method): nasal cannula  O2 Flow (L/min): 2      PHYSICAL EXAM:  Gen: NAD, resting in bed  HEENT: Normocephalic, atraumatic  Neck: supple, no lymphadenopathy  CV: Regular rate & regular rhythm  Lungs: decreased BS at bases, no fremitus  Abdomen: Soft, BS present  Ext: Warm, well perfused  Neuro: non focal, awake  Skin: no rash, no erythema  Lines: no phlebitis     TESTS & MEASUREMENTS:                        8.4    6.90  )-----------( 340      ( 26 Aug 2022 08:43 )             26.0     08-26    141  |  107  |  12  ----------------------------<  96  4.4   |  27  |  0.8    Ca    8.6      26 Aug 2022 08:43  Mg     1.9     08-26    TPro  5.8<L>  /  Alb  3.5  /  TBili  0.2  /  DBili  x   /  AST  9   /  ALT  10  /  AlkPhos  57  08-24      LIVER FUNCTIONS - ( 24 Aug 2022 18:35 )  Alb: 3.5 g/dL / Pro: 5.8 g/dL / ALK PHOS: 57 U/L / ALT: 10 U/L / AST: 9 U/L / GGT: x                     INFECTIOUS DISEASES TESTING  MRSA PCR Result.: Negative (08-25-22 @ 21:00)  COVID-19 PCR: Detected (08-25-22 @ 04:00)  Procalcitonin, Serum: 0.22 ng/mL (08-14-22 @ 17:45)  COVID-19 PCR: Detected (08-13-22 @ 20:40)      INFLAMMATORY MARKERS      RADIOLOGY & ADDITIONAL TESTS:  I have personally reviewed the last Chest xray  CXR      CT  CT Angio Chest PE Protocol w/ IV Cont:   ACC: [id number] EXAM:  CT ANGIO CHEST PULM ART [facility]                          PROCEDURE DATE:  08/24/2022          INTERPRETATION:  CLINICAL STATEMENT: Shortness of breath, clinical   concern for pulmonary embolus    TECHNIQUE: Multislice helical sections were obtained from the thoracic   inlet to the lung bases during rapid administration of 65 cc of Omnipaque   350 intravenous contrast using a CTA protocol. Thin sections were   reconstructed through the pulmonary vasculature. Sagittal and coronal   reformatted images were acquired, as well as MIP reconstructed images.    COMPARISON CT: CT chest dated 2/26/2020    FINDINGS:    PULMONARY EMBOLUS: No central or proximal segmental pulmonary embolus.   Further evaluation is precluded by degree of respiratory motion.    LUNGS: Respiratory motion degrades images. Small left pleural effusion   with adjacent left lower lobe consolidative opacity/atelectasis. Smaller   right lower lobe opacity/atelectasis. Pleural-based right upper lobe   nodularopacity measuring 1.2 x 1.2 cm (). Marked emphysematous   changes with subpleural reticulations. No pneumothorax.    HEART/GREAT VESSELS: The heart is not visually enlarged. No pericardial   effusion. Great vessels are of normal caliber. Coronary atherosclerosis   is noted.    MEDIASTINUM/THORACIC NODES: No lymphadenopathy.    VISUALIZED UPPER ABDOMEN: No acute pathology on this nondedicated   examination.    BONES/SOFT TISSUES: Degenerative changes of the visualized spine.   Evaluation foracute fracture is limited due to motion..      IMPRESSION:      Motion limited examination. No central or proximal segmental pulmonary   embolus. More distal branches are obscured secondary to motion    Small left pleural effusion with adjacent left lower lobe consolidative   opacity/atelectasis. Smaller right lower lobe opacity/atelectasis. These   findings are persistent or recurrent compared with CT chest dated   2/26/2020.    New pleural based right upper lobe nodular opacity measuring up to 1.2   cm. Differential includes infectious, inflammatory, neoplastic   etiologies. Consider short-term follow-up posttreatment CT chest for   further evaluation.    --- End of Report ---          RUFUS PIEDRA MD; Resident Radiologist  This document has been electronically signed.  ZURI MANCIA MD; Attending Radiologist  This document has been electronically signed. Aug 25 2022 12:22AM (08-24-22 @ 23:00)      CARDIOLOGY TESTING  12 Lead ECG:   Ventricular Rate 71 BPM    Atrial Rate 71 BPM    P-R Interval 146 ms    QRS Duration 78 ms    Q-T Interval 404 ms    QTC Calculation(Bazett) 439 ms    P Axis 80 degrees    R Axis 78 degrees    T Axis 80 degrees    Diagnosis Line Normal sinus rhythm  Minimal voltage criteria for LVH, may be normal variant ( Sokolow-De Guzman )  Borderline ECG    Confirmed by MISTY JAIN MD ([id number]) on 8/24/2022 10:37:59 PM (08-24-22 @ 16:24)  12 Lead ECG:   Ventricular Rate 102 BPM    Atrial Rate 102 BPM    P-R Interval 176 ms    QRS Duration 74 ms    Q-T Interval 346 ms    QTC Calculation(Bazett) 450 ms    P Axis 81 degrees    R Axis 55 degrees    T Axis 76 degrees    Diagnosis Line Sinus tachycardia  Possible Left atrial enlargement  Nonspecific ST abnormality  Abnormal ECG    Confirmed by Maddy Ferrara MD ([id number]) on 8/15/2022 8:09:02 AM (08-14-22 @ 16:34)      MEDICATIONS  ALBUTerol    90 MICROgram(s) HFA Inhaler 2 Inhalation every 6 hours  aspirin  chewable 81 Oral daily  azithromycin  IVPB 500 IV Intermittent every 24 hours  cefTRIAXone   IVPB 1000 IV Intermittent every 24 hours  cholecalciferol 1000 Oral daily  enalapril 2.5 Oral daily  enoxaparin Injectable 40 SubCutaneous every 24 hours  labetalol 200 Oral two times a day  NIFEdipine XL 90 Oral daily  sertraline 100 Oral daily  spironolactone 25 Oral daily  tiotropium 18 MICROgram(s) Capsule 1 Inhalation daily        ANTIBIOTICS:  azithromycin  IVPB 500 milliGRAM(s) IV Intermittent every 24 hours  cefTRIAXone   IVPB 1000 milliGRAM(s) IV Intermittent every 24 hours      ALLERGIES:  Celebrex (Other (Moderate))  sulfonamides (Unknown)

## 2022-08-26 NOTE — DISCHARGE NOTE PROVIDER - CARE PROVIDER_API CALL
Nahum Silva  PEDIATRICS  [address] [place] [address]  Phone: [phone]  Fax: [phone]  Established Patient  Follow Up Time: 1 week

## 2022-08-26 NOTE — SWALLOW BEDSIDE ASSESSMENT ADULT - SLP GENERAL OBSERVATIONS
Pt received in bed, daughter at b/s, pt and daughter deny dysphagia, just ate a turkey wrap for lunch

## 2022-08-26 NOTE — SWALLOW BEDSIDE ASSESSMENT ADULT - SWALLOW EVAL: DIAGNOSIS
oropharyngeal swallow is WFL for thin, puree and regular solids w/o overt s/s of penetration/aspiration

## 2022-08-26 NOTE — DISCHARGE NOTE PROVIDER - NSDCCPCAREPLAN_GEN_ALL_CORE_FT
PRINCIPAL DISCHARGE DIAGNOSIS  Diagnosis: Shortness of breath  Assessment and Plan of Treatment: You came to the ED with shortness of breath, cough and hypoxia. You were hypoxic when you came to ED and found to have pneumonia on CT scan. You were admitted for further management of pneumonia. You were given IV antibiotics. Your symptoms improved and you satted good at nasal cannula. You are stable to be discharged today on Augmentin. Follow up with your PCP.         PRINCIPAL DISCHARGE DIAGNOSIS  Diagnosis: PNA (pneumonia)  Assessment and Plan of Treatment: You came to the ED with shortness of breath, cough and hypoxia. You were hypoxic when you came to ED and found to have pneumonia on CT scan. You were admitted for further management of pneumonia. You were given IV antibiotics. Your symptoms improved and you satted good at nasal cannula. You are stable to be discharged today on Augmentin. Follow up with your PCP.

## 2022-08-26 NOTE — DISCHARGE NOTE PROVIDER - HOSPITAL COURSE
85 yo F with PMHx HTN, HLD, CVA, COPD who recently admitted for covid (s/p RDV) discharged last week Wednesday with Home O2 as pt was desatting on ambulation on RA in previous admission and presented on 8/25 with shortness of breath, hypoxia and cough.    # Left lower lob pneumonia  # Recent COVID-19, given IV RSVx 3 days  # Acute hypoxemic respiratory failure  - CT chest showed Left lower lobe opacities   - Gave IV rocephin and azithromycin  - oxygen supplementation remains 97% on 2L NC at the time of evaluation, pt was previously discharge home on home oxygen    # HFpEF, stable  - spironolactone 25mg daily  - EKG reviewed, sinus rhythm with borderline LVH    # HTN  - home enalapril, labetalol and nifedipine    # CVA with residual Rt sided weakness, 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  - aspirin    # COPD, stable  - albuterol and spiriva  Patient is satting well at 2L NC and symptoms improved. She is stable to be discharged today on PO augmentin. Followup with PCP.       85 yo F with PMHx HTN, HLD, CVA, COPD who recently admitted for covid (s/p RDV) discharged last week Wednesday with Home O2 as pt was desatting on ambulation on RA in previous admission and presented on 8/25 with shortness of breath, hypoxia and cough.    # Left lower lob pneumonia  # Recent COVID-19, given IV RSVx 3 days  # Acute hypoxemic respiratory failure  - CT chest showed Left lower lobe opacities   - Discharge on Augmentin per ID     # Chronic HFpEF, stable  - spironolactone 25mg daily  - EKG reviewed, sinus rhythm with borderline LVH    # HTN  - home enalapril, labetalol and nifedipine    # CVA with residual Rt sided weakness, 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  - aspirin    # COPD, stable  - albuterol and spiriva  Patient is satting well at 2L NC and symptoms improved. She is stable to be discharged today on PO augmentin. Followup with PCP.

## 2022-08-26 NOTE — DISCHARGE NOTE NURSING/CASE MANAGEMENT/SOCIAL WORK - PATIENT PORTAL LINK FT
You can access the FollowMyHealth Patient Portal offered by [facility] by registering at the following website: [email or web address]. By joining [facility]’s FollowMyHealth portal, you will also be able to view your health information using other applications (apps) compatible with our system.

## 2022-08-27 VITALS
SYSTOLIC BLOOD PRESSURE: 134 MMHG | TEMPERATURE: 98 F | RESPIRATION RATE: 18 BRPM | HEART RATE: 71 BPM | DIASTOLIC BLOOD PRESSURE: 75 MMHG

## 2022-08-27 LAB
ANION GAP SERPL CALC-SCNC: 12 MMOL/L — SIGNIFICANT CHANGE UP (ref 7–14)
BASOPHILS # BLD AUTO: 0.03 K/UL — SIGNIFICANT CHANGE UP (ref 0–0.2)
BASOPHILS NFR BLD AUTO: 0.5 % — SIGNIFICANT CHANGE UP (ref 0–1)
BUN SERPL-MCNC: 12 MG/DL — SIGNIFICANT CHANGE UP (ref 10–20)
CALCIUM SERPL-MCNC: 8.8 MG/DL — SIGNIFICANT CHANGE UP (ref 8.5–10.1)
CHLORIDE SERPL-SCNC: 108 MMOL/L — SIGNIFICANT CHANGE UP (ref 98–110)
CO2 SERPL-SCNC: 24 MMOL/L — SIGNIFICANT CHANGE UP (ref 17–32)
CREAT SERPL-MCNC: 0.8 MG/DL — SIGNIFICANT CHANGE UP (ref 0.7–1.5)
EGFR: 72 ML/MIN/1.73M2 — SIGNIFICANT CHANGE UP
EOSINOPHIL # BLD AUTO: 0 K/UL — SIGNIFICANT CHANGE UP (ref 0–0.7)
EOSINOPHIL NFR BLD AUTO: 0 % — SIGNIFICANT CHANGE UP (ref 0–8)
FERRITIN SERPL-MCNC: 137 NG/ML — SIGNIFICANT CHANGE UP (ref 15–150)
FOLATE SERPL-MCNC: 5.6 NG/ML — SIGNIFICANT CHANGE UP
GLUCOSE SERPL-MCNC: 184 MG/DL — HIGH (ref 70–99)
HCT VFR BLD CALC: 26 % — LOW (ref 37–47)
HGB BLD-MCNC: 8.4 G/DL — LOW (ref 12–16)
IMM GRANULOCYTES NFR BLD AUTO: 0.8 % — HIGH (ref 0.1–0.3)
LYMPHOCYTES # BLD AUTO: 0.53 K/UL — LOW (ref 1.2–3.4)
LYMPHOCYTES # BLD AUTO: 8.5 % — LOW (ref 20.5–51.1)
MAGNESIUM SERPL-MCNC: 1.9 MG/DL — SIGNIFICANT CHANGE UP (ref 1.8–2.4)
MCHC RBC-ENTMCNC: 27.5 PG — SIGNIFICANT CHANGE UP (ref 27–31)
MCHC RBC-ENTMCNC: 32.3 G/DL — SIGNIFICANT CHANGE UP (ref 32–37)
MCV RBC AUTO: 85 FL — SIGNIFICANT CHANGE UP (ref 81–99)
MONOCYTES # BLD AUTO: 0.5 K/UL — SIGNIFICANT CHANGE UP (ref 0.1–0.6)
MONOCYTES NFR BLD AUTO: 8 % — SIGNIFICANT CHANGE UP (ref 1.7–9.3)
NEUTROPHILS # BLD AUTO: 5.14 K/UL — SIGNIFICANT CHANGE UP (ref 1.4–6.5)
NEUTROPHILS NFR BLD AUTO: 82.2 % — HIGH (ref 42.2–75.2)
NRBC # BLD: 0 /100 WBCS — SIGNIFICANT CHANGE UP (ref 0–0)
PLATELET # BLD AUTO: 327 K/UL — SIGNIFICANT CHANGE UP (ref 130–400)
POTASSIUM SERPL-MCNC: 4.5 MMOL/L — SIGNIFICANT CHANGE UP (ref 3.5–5)
POTASSIUM SERPL-SCNC: 4.5 MMOL/L — SIGNIFICANT CHANGE UP (ref 3.5–5)
RBC # BLD: 3.06 M/UL — LOW (ref 4.2–5.4)
RBC # FLD: 14.2 % — SIGNIFICANT CHANGE UP (ref 11.5–14.5)
S PNEUM AG UR QL: NEGATIVE — SIGNIFICANT CHANGE UP
SODIUM SERPL-SCNC: 144 MMOL/L — SIGNIFICANT CHANGE UP (ref 135–146)
VIT B12 SERPL-MCNC: 401 PG/ML — SIGNIFICANT CHANGE UP (ref 232–1245)
WBC # BLD: 6.25 K/UL — SIGNIFICANT CHANGE UP (ref 4.8–10.8)
WBC # FLD AUTO: 6.25 K/UL — SIGNIFICANT CHANGE UP (ref 4.8–10.8)

## 2022-08-27 PROCEDURE — 99238 HOSP IP/OBS DSCHRG MGMT 30/<: CPT

## 2022-08-27 RX ADMIN — Medication 2.5 MILLIGRAM(S): at 08:03

## 2022-08-27 RX ADMIN — Medication 1000 UNIT(S): at 11:56

## 2022-08-27 RX ADMIN — Medication 90 MILLIGRAM(S): at 08:03

## 2022-08-27 RX ADMIN — Medication 81 MILLIGRAM(S): at 11:55

## 2022-08-27 RX ADMIN — SERTRALINE 100 MILLIGRAM(S): 25 TABLET, FILM COATED ORAL at 11:55

## 2022-08-27 RX ADMIN — Medication 200 MILLIGRAM(S): at 08:02

## 2022-08-27 RX ADMIN — AZITHROMYCIN 255 MILLIGRAM(S): 500 TABLET, FILM COATED ORAL at 08:04

## 2022-08-27 RX ADMIN — SPIRONOLACTONE 25 MILLIGRAM(S): 25 TABLET, FILM COATED ORAL at 08:02

## 2022-08-27 RX ADMIN — CEFTRIAXONE 100 MILLIGRAM(S): 500 INJECTION, POWDER, FOR SOLUTION INTRAMUSCULAR; INTRAVENOUS at 08:03

## 2022-08-30 LAB
CULTURE RESULTS: SIGNIFICANT CHANGE UP
SPECIMEN SOURCE: SIGNIFICANT CHANGE UP

## 2022-09-08 ENCOUNTER — APPOINTMENT (OUTPATIENT)
Dept: CARE COORDINATION | Facility: HOME HEALTH | Age: 87
End: 2022-09-08

## 2022-09-08 VITALS
OXYGEN SATURATION: 94 % | SYSTOLIC BLOOD PRESSURE: 114 MMHG | DIASTOLIC BLOOD PRESSURE: 60 MMHG | HEART RATE: 70 BPM | RESPIRATION RATE: 16 BRPM

## 2022-09-08 DIAGNOSIS — E78.5 HYPERLIPIDEMIA, UNSPECIFIED: ICD-10-CM

## 2022-09-08 DIAGNOSIS — I69.951 HEMIPLEGIA AND HEMIPARESIS FOLLOWING UNSPECIFIED CEREBROVASCULAR DISEASE AFFECTING RIGHT DOMINANT SIDE: ICD-10-CM

## 2022-09-08 DIAGNOSIS — J96.01 ACUTE RESPIRATORY FAILURE WITH HYPOXIA: ICD-10-CM

## 2022-09-08 DIAGNOSIS — R06.02 SHORTNESS OF BREATH: ICD-10-CM

## 2022-09-08 DIAGNOSIS — J18.9 PNEUMONIA, UNSPECIFIED ORGANISM: ICD-10-CM

## 2022-09-08 DIAGNOSIS — Z86.16 PERSONAL HISTORY OF COVID-19: ICD-10-CM

## 2022-09-08 DIAGNOSIS — Z99.3 DEPENDENCE ON WHEELCHAIR: ICD-10-CM

## 2022-09-08 DIAGNOSIS — J44.0 CHRONIC OBSTRUCTIVE PULMONARY DISEASE WITH (ACUTE) LOWER RESPIRATORY INFECTION: ICD-10-CM

## 2022-09-08 DIAGNOSIS — Z87.891 PERSONAL HISTORY OF NICOTINE DEPENDENCE: ICD-10-CM

## 2022-09-08 DIAGNOSIS — D64.9 ANEMIA, UNSPECIFIED: ICD-10-CM

## 2022-09-08 DIAGNOSIS — Z88.2 ALLERGY STATUS TO SULFONAMIDES: ICD-10-CM

## 2022-09-08 DIAGNOSIS — I11.0 HYPERTENSIVE HEART DISEASE WITH HEART FAILURE: ICD-10-CM

## 2022-09-08 DIAGNOSIS — I50.32 CHRONIC DIASTOLIC (CONGESTIVE) HEART FAILURE: ICD-10-CM

## 2022-09-08 DIAGNOSIS — I50.9 HEART FAILURE, UNSPECIFIED: ICD-10-CM

## 2022-09-08 DIAGNOSIS — Z99.81 DEPENDENCE ON SUPPLEMENTAL OXYGEN: ICD-10-CM

## 2022-09-08 DIAGNOSIS — J44.9 CHRONIC OBSTRUCTIVE PULMONARY DISEASE, UNSPECIFIED: ICD-10-CM

## 2022-09-08 PROCEDURE — 99495 TRANSJ CARE MGMT MOD F2F 14D: CPT

## 2022-09-09 PROBLEM — J18.9 PNA (PNEUMONIA): Status: ACTIVE | Noted: 2022-09-09

## 2022-09-09 PROBLEM — J44.9 CHRONIC OBSTRUCTIVE PULMONARY DISEASE, UNSPECIFIED COPD TYPE: Status: ACTIVE | Noted: 2020-03-10

## 2022-09-09 PROBLEM — I50.9 CHF (CONGESTIVE HEART FAILURE): Status: ACTIVE | Noted: 2020-03-09

## 2022-09-09 NOTE — PHYSICAL EXAM
[No Acute Distress] : no acute distress [Well-Appearing] : well-appearing [No Respiratory Distress] : no respiratory distress  [No Accessory Muscle Use] : no accessory muscle use [Normal Rhythm/Effort] : normal respiratory rhythm and effort [Rales / Crackles Bilateral] : no rales or crackles were heard [Wheezing Bilaterally] : no wheezing was heard [Decreased Breath Sounds Bilaterally Bases] : breath sounds were diminished over both bases [Normal Rate] : normal rate  [Regular Rhythm] : with a regular rhythm [Normal S1, S2] : normal S1 and S2 [No Edema] : there was no peripheral edema [Non Tender] : non-tender [Non-distended] : non-distended [Normal Affect] : the affect was normal [Alert and Oriented x3] : oriented to person, place, and time [Normal Insight/Judgement] : insight and judgment were intact [de-identified] : uses walker

## 2022-09-09 NOTE — HISTORY OF PRESENT ILLNESS
[FreeTextEntry1] : follow up hospital discharge for pNA  [de-identified] : Patient is a 85y/o female enrolled in the [facility] TCM program s.p a recent discharge from [facility] for PNA/copd exacerbation 8/25- 8/27 patient was treated with ABRX improved clinically and dc home with [facility] and 02. on arrival patient is alert in NAD denies c/p, sob, cough, LE swelling non compliant with dailly weights, PCP follow up in place. Patient was contacted by RN from [facility] and medication reconciliation was done with in 48 hours of discharge\par \par \par Hospital Course copied from [facility] : 87 yo F with PMHx HTN, HLD, CVA, COPD who recently admitted for covid (s/p RDV) discharged last week Wednesday with Home O2 as pt was desatting on ambulation on RA in previous admission and presented on 8/25 with shortness of breath, hypoxia and cough.\par # Left lower lob pneumonia\par # Recent COVID-19, given IV RSVx 3 days\par # Acute hypoxemic respiratory failure\par - CT chest showed Left lower lobe opacities \par - Discharge on Augmentin per ID \par # Chronic HFpEF, stable\par - spironolactone 25mg daily\par - EKG reviewed, sinus rhythm with borderline LVH\par # HTN\par - home enalapril, labetalol and nifedipine\par # CVA with residual Rt sided weakness, 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\par - aspirin\par # COPD, stable\par - albuterol and spiriva\par Patient is satting well at 2L NC and symptoms improved. She is stable to be discharged today on PO augmentin. Followup with PCP

## 2022-09-09 NOTE — COUNSELING
[de-identified] : Pt was informed about CN’s role/ STARS program and overview of transitional care reviewed with patient. Pt educated on topics of importance such as compliance with prescribed medication regimen, COPD action plan and escalation process, adequate hydration, and proper diet. Pt encouraged calling CN with any issues, concerns or questions, also educated to notify CN if experiencing CP, SOB, cough, increased mucus production, increased use of rescue inhaler, fever, chills, fatigue, “chest cold symptoms” dizziness, lightheadedness, n/v/d/c, swelling to extremities, wt gain  and/or any signs of COPD/ CHF  exacerbation/flare as reviewed. Reassurance provided. Will continue to monitor\par \par

## 2022-09-09 NOTE — ASSESSMENT
[FreeTextEntry1] : anna is a 85y/o female enrolled in the [facility] TCM program s.p a recent discharge from [facility] for PNA/copd exacerbation 8/25- 8/27 patient was treated with ABRX improved clinically and dc home with [facility] and . on arrival patient is alert in NAD denies c/p, sob, cough, LE swelling non compliant with dailly weights, PCP follow up in place. Patient was contacted by RN from TCM and medication reconciliation was done with in 48 hours of discharge

## 2022-09-09 NOTE — PLAN
[FreeTextEntry1] : PNA- Complete ABRX\par Increase activity as tolerated and maintain optimal activity levels. Continue coughing and deep breathing exercises including use of Incentive Spirometry.\par Receive routine pneumococcal and influenza vaccinations.\par Maintain proper nutrition and adequate hydration.\par Notify NP for worsening symptoms including fever, chills, SOB, CP, increased cough and secretions.\par Follow up with MD within 7 days of discharge.\par CHF- daily weights , low salt diet , c/w spirolactone\par COPD- c/w maintence inhaler 02, albuterol \par c/w current medication regimen\par pcp / pulmonary follow up advised. \par

## 2022-10-10 NOTE — INPATIENT CERTIFICATION FOR MEDICARE PATIENTS - IN ORDER TO MEET MEDICARE REQUIREMENTS.
Complete bed bath given, ulloa care completed, bed linens changed. Staff completed care with min help from pt. Pt transferred from bed to chair, with walker and 1 assist, solis poorly. Pt is very weak with unsteady ambulation. Pt resting in chair, call light within reach. Will continue to monitor. In order to meet Medicare requirements, the clinical documentation must support the information cited in the admission order.  Please be sure to provide detailed and clear documentation about the following in the admitting note/history and physical:

## 2023-07-25 NOTE — ED ADULT NURSE NOTE - NS ED NURSE PATIENT LEFT UNIT TIME
[Chaperone Present] : A chaperone was present in the examining room during all aspects of the physical examination [Appropriately responsive] : appropriately responsive [Alert] : alert [No Acute Distress] : no acute distress [No Lymphadenopathy] : no lymphadenopathy [Regular Rate Rhythm] : regular rate rhythm [No Murmurs] : no murmurs [Clear to Auscultation B/L] : clear to auscultation bilaterally [Soft] : soft [Non-tender] : non-tender [Non-distended] : non-distended [No HSM] : No HSM [No Lesions] : no lesions [No Mass] : no mass [Oriented x3] : oriented x3 [Examination Of The Breasts] : a normal appearance [No Masses] : no breast masses were palpable [Labia Majora] : normal [Labia Minora] : normal [IUD String] : an IUD string was noted [Normal] : normal [Uterine Adnexae] : normal 05:12

## 2023-09-01 NOTE — OCCUPATIONAL THERAPY INITIAL EVALUATION ADULT - RANGE OF MOTION EXAMINATION, UPPER EXTREMITY
02-Sep-2023 20:39
no arom rue/Left UE Active ROM was WFL (within functional limits)/Right UE Passive ROM was WNL (within normal limits)

## 2023-09-15 NOTE — DISCHARGE NOTE ADULT - PATIENT PORTAL LINK FT
You can access the [facility] Patient Portal, offered by [facility], by registering with the following website: [email or web address] no

## 2024-02-01 NOTE — PATIENT PROFILE ADULT - FUNCTIONAL ASSESSMENT - BASIC MOBILITY SECTION LABEL
Yes
Arrived EMS for [place] c/o intermittent chest pain x 1 week. Pt states she had covid 1/19 and chest pain became worse since
Arrived EMS for [place] c/o intermittent chest pain x 1 week and dizzines . Pt states she had covid on 1/19 and chest pain became worse since. Pt states" I just don't feel well". EKG performed
.

## 2024-04-01 NOTE — PATIENT PROFILE ADULT - PACKS PER DAY
Detail Level: Detailed Add 52460 Cpt? (Important Note: In 2017 The Use Of 29963 Is Being Tracked By Cms To Determine Future Global Period Reimbursement For Global Periods): yes 0

## 2025-03-19 NOTE — ED ADULT NURSE NOTE - MODE OF DISCHARGE
Detail Level: Zone Continue Regimen: Spironolactone 100mg (refill sent today)\\nTretinoin 0.05% topical cream (refill sent today) Ambulatory

## 2025-04-03 NOTE — ED ADULT NURSE NOTE - PRIMARY CARE PROVIDER
Care Due:                  Date            Visit Type   Department     Provider  --------------------------------------------------------------------------------                                EP -                              PRIMARY      [facility]  Last Visit: 03-      CARE ([facility])   MEDICINE       Natalee Henderson                              EP -                              PRIMARY      [facility]  Next Visit: 09-      CARE ([facility])   MEDICINE       Nataleedano Henderson                                                            Last  Test          Frequency    Reason                     Performed    Due Date  --------------------------------------------------------------------------------    CMP.........  12 months..  atorvastatin, lisinopriL,   10-   10-                             metFORMIN, venlafaxine,                             vitamin..................    HBA1C.......  6 months...  metFORMIN................  09- 03-    Vitamin D...  12 months..  vitamin..................  Not Found    Overdue    [employer] Embedded Care Due Messages. Reference number: [id number].   4/03/2025 3:28:31 PM CDT  
unknown

## 2025-05-23 NOTE — ED ADULT NURSE NOTE - PMH
2nd attempt to reach patient to schedule an appointment with the [facility]. LVM for patient to call the office back at [phone].    
3rd attempt to reach patient to schedule an appointment with the [facility]. M for patient to call the office back at [phone].  
Called pt & LVM to schedule an appt with the [facility]. Asked pt to please call our office back at [phone].  
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
